# Patient Record
Sex: MALE | HISPANIC OR LATINO | Employment: OTHER | URBAN - METROPOLITAN AREA
[De-identification: names, ages, dates, MRNs, and addresses within clinical notes are randomized per-mention and may not be internally consistent; named-entity substitution may affect disease eponyms.]

---

## 2017-03-10 RX ORDER — ALBUTEROL SULFATE 90 UG/1
AEROSOL, METERED RESPIRATORY (INHALATION)
Qty: 3 INHALER | Refills: 3 | Status: SHIPPED | OUTPATIENT
Start: 2017-03-10 | End: 2017-07-03 | Stop reason: SDUPTHER

## 2017-04-05 DIAGNOSIS — F17.200 TOBACCO USE DISORDER: ICD-10-CM

## 2017-04-06 RX ORDER — VARENICLINE TARTRATE 1 MG/1
1 TABLET, FILM COATED ORAL 2 TIMES DAILY
Qty: 56 TAB | Refills: 1 | Status: SHIPPED | OUTPATIENT
Start: 2017-04-06 | End: 2017-06-16

## 2017-05-11 RX ORDER — TIOTROPIUM BROMIDE 18 UG/1
CAPSULE ORAL; RESPIRATORY (INHALATION)
Qty: 90 CAP | Refills: 3 | Status: SHIPPED | OUTPATIENT
Start: 2017-05-11 | End: 2019-10-11

## 2017-06-09 ENCOUNTER — APPOINTMENT (OUTPATIENT)
Dept: MEDICAL GROUP | Facility: MEDICAL CENTER | Age: 72
End: 2017-06-09
Payer: MEDICAID

## 2017-06-16 ENCOUNTER — OFFICE VISIT (OUTPATIENT)
Dept: MEDICAL GROUP | Facility: PHYSICIAN GROUP | Age: 72
End: 2017-06-16
Payer: MEDICARE

## 2017-06-16 VITALS
TEMPERATURE: 98 F | DIASTOLIC BLOOD PRESSURE: 78 MMHG | SYSTOLIC BLOOD PRESSURE: 122 MMHG | BODY MASS INDEX: 20 KG/M2 | OXYGEN SATURATION: 94 % | WEIGHT: 132 LBS | RESPIRATION RATE: 16 BRPM | HEART RATE: 97 BPM | HEIGHT: 68 IN

## 2017-06-16 DIAGNOSIS — R97.20 ELEVATED PSA: ICD-10-CM

## 2017-06-16 DIAGNOSIS — B07.9 VIRAL WARTS, UNSPECIFIED TYPE: ICD-10-CM

## 2017-06-16 DIAGNOSIS — R91.8 LUNG MASS: ICD-10-CM

## 2017-06-16 DIAGNOSIS — J41.1 MUCOPURULENT CHRONIC BRONCHITIS (HCC): ICD-10-CM

## 2017-06-16 DIAGNOSIS — I73.9 PERIPHERAL ARTERY DISEASE (HCC): ICD-10-CM

## 2017-06-16 DIAGNOSIS — Z23 NEED FOR VACCINATION: ICD-10-CM

## 2017-06-16 DIAGNOSIS — F17.200 TOBACCO USE DISORDER: ICD-10-CM

## 2017-06-16 DIAGNOSIS — Z12.11 SCREEN FOR COLON CANCER: ICD-10-CM

## 2017-06-16 DIAGNOSIS — I35.0 AORTIC STENOSIS, MODERATE: ICD-10-CM

## 2017-06-16 PROCEDURE — 90732 PPSV23 VACC 2 YRS+ SUBQ/IM: CPT | Performed by: INTERNAL MEDICINE

## 2017-06-16 PROCEDURE — 99214 OFFICE O/P EST MOD 30 MIN: CPT | Mod: 25 | Performed by: INTERNAL MEDICINE

## 2017-06-16 PROCEDURE — G0009 ADMIN PNEUMOCOCCAL VACCINE: HCPCS | Performed by: INTERNAL MEDICINE

## 2017-06-16 ASSESSMENT — PATIENT HEALTH QUESTIONNAIRE - PHQ9: CLINICAL INTERPRETATION OF PHQ2 SCORE: 0

## 2017-06-16 NOTE — MR AVS SNAPSHOT
"        Remi Matt   2017 2:20 PM   Office Visit   MRN: [id number]    Department:  [facility]   Dept Phone:  [phone]    Description:  Male : 1945   Provider:  Christopher Mantilla M.D.           Reason for Visit     Establish Care     Warts left 4th finger    Heart Problem pt was told that he had a heart attack       Allergies as of 2017     No Known Allergies      You were diagnosed with     Mucopurulent chronic bronchitis (CMS-formerly [facility])   [491.1.ICD-9-CM]       Tobacco use disorder   [305.1.ICD-9-CM]       Lung mass   [713653]       Peripheral artery disease (CMS-formerly [facility])   [050575]       Aortic stenosis, moderate   [342928]       Viral warts, unspecified type   [3956740]       Elevated PSA   [721135]       Screen for colon cancer   [733306]       Need for vaccination   [983744]         Vital Signs     Blood Pressure Pulse Temperature Respirations Height Weight    122/78 mmHg 97 36.7 °C (98 °F) 16 1.727 m (5' 8\") 59.875 kg (132 lb)    Body Mass Index Oxygen Saturation Smoking Status             20.08 kg/m2 94% Current Every Day Smoker         Basic Information     Date Of Birth Sex Race Ethnicity Preferred Language    1945 Male  or  Non- English      Your appointments     2017  2:40 PM   Established Patient with Christopher Mantilla M.D.   [facility] (--)    [address]  [address]  [facility] [id number] [phone]           You will be receiving a confirmation call a few days before your appointment from our automated call confirmation system.              Problem List              ICD-10-CM Priority Class Noted - Resolved    COPD (chronic obstructive pulmonary disease) (CMS-formerly [facility]) J44.9   Unknown - Present    Elevated PSA R97.20   3/27/2015 - Present    Peripheral artery disease (CMS-HCC) I73.9   2015 - Present    Aortic stenosis, moderate I35.0   2015 - Present    Tobacco use disorder F17.200   2015 - Present    Wart B07.9   2015 - " Present    Lung mass R91.8   6/16/2017 - Present      Health Maintenance        Date Due Completion Dates    IMM DTaP/Tdap/Td Vaccine (1 - Tdap) 10/16/1963 ---    IMM ZOSTER VACCINE 10/16/2004 ---    COLON CANCER SCREENING ANNUAL FIT 3/22/2017 3/22/2016    IMM PNEUMOCOCCAL 65+ (ADULT) LOW/MEDIUM RISK SERIES (2 of 2 - PCV13) 6/16/2018 6/16/2017            Current Immunizations     Influenza Vaccine Adult HD 11/4/2016  2:45 PM    Influenza Vaccine Quad Inj (Pf) 9/18/2015    Pneumococcal Vaccine (UF)Historical Data 9/18/2013    Pneumococcal polysaccharide vaccine (PPSV-23) 6/16/2017  3:19 PM      Below and/or attached are the medications your provider expects you to take. Review all of your home medications and newly ordered medications with your provider and/or pharmacist. Follow medication instructions as directed by your provider and/or pharmacist. Please keep your medication list with you and share with your provider. Update the information when medications are discontinued, doses are changed, or new medications (including over-the-counter products) are added; and carry medication information at all times in the event of emergency situations     Allergies:  No Known Allergies          Medications  Valid as of: June 16, 2017 -  3:23 PM    Generic Name Brand Name Tablet Size Instructions for use    Albuterol Sulfate (Aero Soln) VENTOLIN  (90 BASE) MCG/ACT USE 2 PUFFS BY INHALATIONS EVERY 6 HOURS AS NEEDED FOR SHORTNESS OF BREATH        Aspirin (Tablet Delayed Response) aspirin 81 MG Take 1 Tab by mouth every day.        Fluticasone Furoate-Vilanterol (AEROSOL POWDER, BREATH ACTIVATED) Fluticasone Furoate-Vilanterol 100-25 MCG/INH Inhale 1 Puff by mouth every day.        Ipratropium-Albuterol (Solution) DUONEB 0.5-2.5 (3) MG/3ML USE 1 VIAL INTO NEBULIZER 2 TIMES DAILY AS NEEDED FOR SHORTNESS OF BREATH        Tiotropium Bromide Monohydrate (Cap) SPIRIVA HANDIHALER 18 MCG INHALE 1 CAP BY MOUTH EVERY DAY. AT THE  SAME TIME EVERY DAY        Varenicline Tartrate (Misc) CHANTIX SANDRA 0.5 MG X 11 & 1 MG X 42 0.5 mg by mouth once daily for 3 days, then 0.5 mg by mouth twice daily for 4 days, then 1 mg by mouth once daily.        .                 Medicines prescribed today were sent to:     [id number] [place] - [address]    [address] [place] [address]    Phone: [phone] Fax: [phone]    Open 24 Hours?: No    [id number] - [place] - [address]    [address] [facility] [address]    Phone: [phone] Fax: [phone]    Open 24 Hours?: No      Medication refill instructions:       If your prescription bottle indicates you have medication refills left, it is not necessary to call your provider’s office. Please contact your pharmacy and they will refill your medication.    If your prescription bottle indicates you do not have any refills left, you may request refills at any time through one of the following ways: The online "[employer] system (except Urgent Care), by calling your provider’s office, or by asking your pharmacy to contact your provider’s office with a refill request. Medication refills are processed only during regular business hours and may not be available until the next business day. Your provider may request additional information or to have a follow-up visit with you prior to refilling your medication.   *Please Note: Medication refills are assigned a new Rx number when refilled electronically. Your pharmacy may indicate that no refills were authorized even though a new prescription for the same medication is available at the pharmacy. Please request the medicine by name with the pharmacy before contacting your provider for a refill.        Your To Do List     Future Labs/Procedures Complete By Expires    COMP METABOLIC PANEL  As directed 6/17/2018    CT-CHEST (THORAX) WITH  As directed 12/17/2017    VITAMIN D,25 HYDROXY  As directed 6/17/2018      Referral     A referral  request has been sent to our patient care coordination department. Please allow 3-5 business days for us to process this request and contact you either by phone or mail. If you do not hear from us by the 5th business day, please call us at [phone].           Niko Status: Patient Declined        Quit Tobacco Information     Do you want to quit using tobacco?    Quitting tobacco decreases risks of cancer, heart and lung disease, increases life expectancy, improves sense of taste and smell, and increases spending money, among other benefits.    If you are thinking about quitting, we can help.  • [facility] Quit Tobacco Program: [phone]  o Program occurs weekly for four weeks and includes pharmacist consultation on products to support quitting smoking or chewing tobacco. A provider referral is needed for pharmacist consultation.  • Tobacco Users Help Hotline: 0-075-QUITNOW (969-1419) or https://[place].quitlogix.org/  o Free, confidential telephone and online coaching for [place] residents. Sessions are designed on a schedule that is convenient for you. Eligible clients receive free nicotine replacement therapy.  • Nationally: www.smokefree.gov  o Information and professional assistance to support both immediate and long-term needs as you become, and remain, a non-smoker. Smokefree.gov allows you to choose the help that best fits your needs.

## 2017-06-16 NOTE — ASSESSMENT & PLAN NOTE
Moderate, per chart review   Echocardiogram 2015  CONCLUSIONS  Normal left ventricular chamber size. Normal left ventricular wall   thickness. Normal left ventricular systolic function. Ejection fraction   is measured to be 60% by Rosenthal's biplane 2D analysis. Grade I   diastolic dysfunction.  Moderately dilated right ventricle.  Enlarged right atrium.  Possible bicuspid aortic valve with leaflet calcification. Mild to   moderate aortic stenosis. Transvalvular gradients are Peak: 38mmHg,   Mean: 22mmHg. Aortic valve area calculated from the continuity equation   is 0.8 cm sq. Dimensionless index is(0.32).

## 2017-06-16 NOTE — ASSESSMENT & PLAN NOTE
Right fifth digit warts. He has had cryotherapy in the past. After informed consent obtained, cryotherapy was applied to the lesions personally by me. Patient tolerated the procedure well. Wound care instructions were given.

## 2017-06-16 NOTE — PROGRESS NOTES
Subjective:   Remi Matt is a 71 y.o. male here today for lung mass, finger warts    Aortic stenosis, moderate  Moderate, per chart review   Echocardiogram 2015  CONCLUSIONS  Normal left ventricular chamber size. Normal left ventricular wall   thickness. Normal left ventricular systolic function. Ejection fraction   is measured to be 60% by Rosenthal's biplane 2D analysis. Grade I   diastolic dysfunction.  Moderately dilated right ventricle.  Enlarged right atrium.  Possible bicuspid aortic valve with leaflet calcification. Mild to   moderate aortic stenosis. Transvalvular gradients are Peak: 38mmHg,   Mean: 22mmHg. Aortic valve area calculated from the continuity equation   is 0.8 cm sq. Dimensionless index is(0.32).    Elevated PSA  PSA 4.8 (10/2016).    Wart  Right fifth digit warts. He has had cryotherapy in the past. After informed consent obtained, cryotherapy was applied to the lesions personally by me. Patient tolerated the procedure well. Wound care instructions were given.    Tobacco use disorder  He still continues to smoke. He stopped smoking for some time and then restarted again when his father passed away. He is asking for chantix. Prescription given    Peripheral artery disease (CMS-HCC)  Denies claudication. Peripheral vascular disease per chart review. No CEM on file.     Lung mass  05/19/2017 he was in the hospital at [place] because of a fall after he drank a strong beer. His son was concerned therefore she sent to hospital, lab work normal, . He had CXR showed a mass right mid lung. he denied night sweats, unintentional weight loss or hemoptysis. He is a smoker    COPD (chronic obstructive pulmonary disease)  He never had PFTs in follow-up. He denies chronic cough or shortness of breath. Still continue to smoke. Will evaluate            Current medicines (including changes today)  Current Outpatient Prescriptions   Medication Sig Dispense Refill   • varenicline (CHANTIX STARTING MONTH  SANDRA) 0.5 MG X 11 & 1 MG X 42 tablet 0.5 mg by mouth once daily for 3 days, then 0.5 mg by mouth twice daily for 4 days, then 1 mg by mouth once daily. 56 Tab 3   • SPIRIVA HANDIHALER 18 MCG Cap INHALE 1 CAP BY MOUTH EVERY DAY. AT THE SAME TIME EVERY DAY 90 Cap 3   • VENTOLIN  (90 BASE) MCG/ACT Aero Soln inhalation aerosol USE 2 PUFFS BY INHALATIONS EVERY 6 HOURS AS NEEDED FOR SHORTNESS OF BREATH 3 Inhaler 3   • ipratropium-albuterol (DUONEB) 0.5-2.5 (3) MG/3ML nebulizer solution USE 1 VIAL INTO NEBULIZER 2 TIMES DAILY AS NEEDED FOR SHORTNESS OF BREATH 180 mL 11   • Fluticasone Furoate-Vilanterol 100-25 MCG/INH AEROSOL POWDER, BREATH ACTIVATED Inhale 1 Puff by mouth every day. 3 Each 11   • aspirin EC 81 MG EC tablet Take 1 Tab by mouth every day. 100 Tab 0     No current facility-administered medications for this visit.     He  has a past medical history of COPD (chronic obstructive pulmonary disease) (CMS-[facility]).    Current Outpatient Prescriptions   Medication Sig Dispense Refill   • varenicline (CHANTIX STARTING MONTH PAK) 0.5 MG X 11 & 1 MG X 42 tablet 0.5 mg by mouth once daily for 3 days, then 0.5 mg by mouth twice daily for 4 days, then 1 mg by mouth once daily. 56 Tab 3   • SPIRIVA HANDIHALER 18 MCG Cap INHALE 1 CAP BY MOUTH EVERY DAY. AT THE SAME TIME EVERY DAY 90 Cap 3   • VENTOLIN  (90 BASE) MCG/ACT Aero Soln inhalation aerosol USE 2 PUFFS BY INHALATIONS EVERY 6 HOURS AS NEEDED FOR SHORTNESS OF BREATH 3 Inhaler 3   • ipratropium-albuterol (DUONEB) 0.5-2.5 (3) MG/3ML nebulizer solution USE 1 VIAL INTO NEBULIZER 2 TIMES DAILY AS NEEDED FOR SHORTNESS OF BREATH 180 mL 11   • Fluticasone Furoate-Vilanterol 100-25 MCG/INH AEROSOL POWDER, BREATH ACTIVATED Inhale 1 Puff by mouth every day. 3 Each 11   • aspirin EC 81 MG EC tablet Take 1 Tab by mouth every day. 100 Tab 0     No current facility-administered medications for this visit.       Allergies as of 06/16/2017   • (No Known Allergies)  "      Social History     Social History   • Marital Status:      Spouse Name: N/A   • Number of Children: N/A   • Years of Education: N/A     Occupational History   • Not on file.     Social History Main Topics   • Smoking status: Current Every Day Smoker -- 0.25 packs/day for 20 years     Types: Cigarettes     Last Attempt to Quit: 08/01/2014   • Smokeless tobacco: Never Used   • Alcohol Use: 0.0 oz/week     0 Standard drinks or equivalent per week      Comment: occ   • Drug Use: No   • Sexual Activity: Not on file      Comment: single retired      Other Topics Concern   • Not on file     Social History Narrative        Family History   Problem Relation Age of Onset   • Cancer Neg Hx    • Diabetes Neg Hx    • Heart Disease Neg Hx    • Stroke Neg Hx    • Heart Attack Neg Hx        Past Surgical History   Procedure Laterality Date   • Arthroscopy, knee  1978     meniscus, right   • Hernia repair  child   • Tonsillectomy  child       ROS   All systems reviewed are negative except for HPI         Objective:     Blood pressure 122/78, pulse 97, temperature 36.7 °C (98 °F), resp. rate 16, height 1.727 m (5' 8\"), weight 59.875 kg (132 lb), SpO2 94 %. Body mass index is 20.08 kg/(m^2).   Physical Exam:  Constitutional: Alert, no distress.  Skin: Warm, dry, good turgor, no rashes in visible areas.  Eye: Equal, round and reactive, conjunctiva clear, lids normal.  ENMT: Lips without lesions, good dentition, oropharynx clear.  Neck: Trachea midline, no masses, no thyromegaly. No cervical or supraclavicular lymphadenopathy  Respiratory: Unlabored respiratory effort, lungs clear to auscultation, no wheezes, no ronchi.  Cardiovascular: Normal S1, S2, no murmur, no edema.  Abdomen: Soft, non-tender, no masses, no hepatosplenomegaly.  Psych: Alert and oriented x3, normal affect and mood.        Assessment and Plan:   The following treatment plan was discussed    1. Mucopurulent chronic bronchitis " (CMS-HCC)  Stable, no signs of exacerbation, PFTs for further eval   - PULMONARY FUNCTION TESTS Test requested: Complete Pulmonary Function Test    2. Tobacco use disorder  counseling provided . Pt would like to try chantix again . Prescription given   - varenicline (CHANTIX STARTING MONTH SANDRA) 0.5 MG X 11 & 1 MG X 42 tablet; 0.5 mg by mouth once daily for 3 days, then 0.5 mg by mouth twice daily for 4 days, then 1 mg by mouth once daily.  Dispense: 56 Tab; Refill: 3  - VITAMIN D,25 HYDROXY; Future    3. Lung mass  Needs to be evaluated with CT scan. Order in place . No signs of active cancer   - CT-CHEST (THORAX) WITH; Future  - COMP METABOLIC PANEL; Future    4. Peripheral artery disease (CMS-HCC)  Stabel. CEM at next apt     5. Aortic stenosis, moderate  Not symptomatic     6. Viral warts, unspecified type  Cryotherapy as per above     7. Elevated PSA  Continue to monitor     8. Screen for colon cancer  - REFERRAL TO GASTROENTEROLOGY    9. Need for vaccination  - PNEUMOCOCCAL POLYSACCHARIDE VACCINE 23-VALENT =>1YO SQ/IM      Followup: Return in about 4 weeks (around 7/14/2017) for Short.

## 2017-06-16 NOTE — ASSESSMENT & PLAN NOTE
05/19/2017 he was in the hospital at [place] because of a fall after he drank a strong beer. His son was concerned therefore she sent to hospital, lab work normal, . He had CXR showed a mass right mid lung. he denied night sweats, unintentional weight loss or hemoptysis. He is a smoker

## 2017-06-16 NOTE — ASSESSMENT & PLAN NOTE
He never had PFTs in follow-up. He denies chronic cough or shortness of breath. Still continue to smoke. Will evaluate

## 2017-06-16 NOTE — ASSESSMENT & PLAN NOTE
He still continues to smoke. He stopped smoking for some time and then restarted again when his father passed away. He is asking for chantix. Prescription given

## 2017-06-21 ENCOUNTER — HOSPITAL ENCOUNTER (OUTPATIENT)
Dept: LAB | Facility: MEDICAL CENTER | Age: 72
End: 2017-06-21
Attending: INTERNAL MEDICINE
Payer: MEDICARE

## 2017-06-21 DIAGNOSIS — F17.200 TOBACCO USE DISORDER: ICD-10-CM

## 2017-06-21 DIAGNOSIS — R91.8 LUNG MASS: ICD-10-CM

## 2017-06-21 LAB
25(OH)D3 SERPL-MCNC: 25 NG/ML (ref 30–100)
ALBUMIN SERPL BCP-MCNC: 3.9 G/DL (ref 3.2–4.9)
ALBUMIN/GLOB SERPL: 1.4 G/DL
ALP SERPL-CCNC: 63 U/L (ref 30–99)
ALT SERPL-CCNC: 12 U/L (ref 2–50)
ANION GAP SERPL CALC-SCNC: 5 MMOL/L (ref 0–11.9)
AST SERPL-CCNC: 14 U/L (ref 12–45)
BILIRUB SERPL-MCNC: 0.6 MG/DL (ref 0.1–1.5)
BUN SERPL-MCNC: 15 MG/DL (ref 8–22)
CALCIUM SERPL-MCNC: 9 MG/DL (ref 8.5–10.5)
CHLORIDE SERPL-SCNC: 107 MMOL/L (ref 96–112)
CO2 SERPL-SCNC: 25 MMOL/L (ref 20–33)
CREAT SERPL-MCNC: 0.76 MG/DL (ref 0.5–1.4)
GFR SERPL CREATININE-BSD FRML MDRD: >60 ML/MIN/1.73 M 2
GLOBULIN SER CALC-MCNC: 2.8 G/DL (ref 1.9–3.5)
GLUCOSE SERPL-MCNC: 95 MG/DL (ref 65–99)
POTASSIUM SERPL-SCNC: 4.1 MMOL/L (ref 3.6–5.5)
PROT SERPL-MCNC: 6.7 G/DL (ref 6–8.2)
SODIUM SERPL-SCNC: 137 MMOL/L (ref 135–145)

## 2017-06-21 PROCEDURE — 82306 VITAMIN D 25 HYDROXY: CPT

## 2017-06-21 PROCEDURE — 80053 COMPREHEN METABOLIC PANEL: CPT

## 2017-06-21 PROCEDURE — 36415 COLL VENOUS BLD VENIPUNCTURE: CPT

## 2017-06-22 ENCOUNTER — TELEPHONE (OUTPATIENT)
Dept: MEDICAL GROUP | Facility: PHYSICIAN GROUP | Age: 72
End: 2017-06-22

## 2017-06-22 NOTE — TELEPHONE ENCOUNTER
Phone Number Called: [phone] (home)     Message: Pt notified of results below.     Left Message for patient to call back: no

## 2017-06-22 NOTE — TELEPHONE ENCOUNTER
----- Message from Christopher Mantilla M.D. sent at 6/22/2017  6:59 AM PDT -----  Please notify patient that blood sugar, kidney function, liver function, are normal  Vitamin D slight lower. Advise to take over the encounter vitamin D 1000 U per day    Thank you    Christopher Mantilla M.D.

## 2017-06-23 ENCOUNTER — HOSPITAL ENCOUNTER (OUTPATIENT)
Dept: RADIOLOGY | Facility: MEDICAL CENTER | Age: 72
End: 2017-06-23
Attending: INTERNAL MEDICINE
Payer: MEDICARE

## 2017-06-23 DIAGNOSIS — J42 CHRONIC BRONCHITIS, UNSPECIFIED CHRONIC BRONCHITIS TYPE (HCC): ICD-10-CM

## 2017-06-23 DIAGNOSIS — R91.8 LUNG MASS: ICD-10-CM

## 2017-06-23 PROCEDURE — 700117 HCHG RX CONTRAST REV CODE 255: Performed by: INTERNAL MEDICINE

## 2017-06-23 PROCEDURE — 71260 CT THORAX DX C+: CPT

## 2017-06-23 RX ADMIN — IOHEXOL 75 ML: 350 INJECTION, SOLUTION INTRAVENOUS at 10:34

## 2017-06-23 NOTE — TELEPHONE ENCOUNTER
Was the patient seen in the last year in this department? Yes     Does patient have an active prescription for medications requested? No     Received Request Via: Pharmacy     Patient requesting a 90 day supply please advise, thank you.

## 2017-06-26 ENCOUNTER — TELEPHONE (OUTPATIENT)
Dept: MEDICAL GROUP | Facility: PHYSICIAN GROUP | Age: 72
End: 2017-06-26

## 2017-06-26 NOTE — TELEPHONE ENCOUNTER
Ct scan shows lung disease from smoking. There is emphysema and scar tissue., otherwise not mass. Will repeat chest ct next year   Pt is working on smoking cessation   Christopher Mantilla M.D.

## 2017-06-27 ENCOUNTER — HOSPITAL ENCOUNTER (OUTPATIENT)
Dept: OTHER | Facility: MEDICAL CENTER | Age: 72
End: 2017-06-27
Attending: INTERNAL MEDICINE
Payer: MEDICARE

## 2017-06-27 DIAGNOSIS — J41.1 MUCOPURULENT CHRONIC BRONCHITIS (HCC): ICD-10-CM

## 2017-06-27 PROCEDURE — 94729 DIFFUSING CAPACITY: CPT

## 2017-06-27 PROCEDURE — 94726 PLETHYSMOGRAPHY LUNG VOLUMES: CPT | Mod: 26 | Performed by: INTERNAL MEDICINE

## 2017-06-27 PROCEDURE — 94060 EVALUATION OF WHEEZING: CPT

## 2017-06-27 PROCEDURE — 94729 DIFFUSING CAPACITY: CPT | Mod: 26 | Performed by: INTERNAL MEDICINE

## 2017-06-27 PROCEDURE — 94060 EVALUATION OF WHEEZING: CPT | Mod: 26 | Performed by: INTERNAL MEDICINE

## 2017-06-27 PROCEDURE — 94726 PLETHYSMOGRAPHY LUNG VOLUMES: CPT

## 2017-06-27 ASSESSMENT — PULMONARY FUNCTION TESTS
FVC_PERCENT_PREDICTED: 86
FEV1/FVC_PERCENT_CHANGE: 100
FEV1: 2.13
FEV1_PREDICTED: 3
FVC_PERCENT_PREDICTED: 88
FVC: 3.47
FEV1_PERCENT_CHANGE: 2
FEV1_PERCENT_PREDICTED: 73
FEV1/FVC_PERCENT_PREDICTED: 83
FEV1_PERCENT_PREDICTED: 71
FEV1/FVC: 62.82
FEV1/FVC_PERCENT_PREDICTED: 83
FEV1/FVC_PERCENT_PREDICTED: 76
FEV1: 2.18
FEV1_PERCENT_CHANGE: 2
FEV1/FVC: 63
FVC: 3.4
FVC_PREDICTED: 3.94

## 2017-06-30 NOTE — PROGRESS NOTES
The given diagnoses are COPD and lung mass.  Technical comments indicate good effort on the part of the patient but he was unable to maintain the required respiratory frequency during lung volume determinations.    Spirometry and the flow volume loop suggest mild to moderately severe obstructive airways disease with an FEV1 to FVC ratio of 63% and reduced midexpiratory airflow.  There is no significant change in dynamic air flow after administration of an inhaled bronchodilator.  The FEV1 is 2.13 L, or 71% of the predicted value.  The shape of the flow volume loop does suggest significant airway obstruction but also incomplete maximal effort.  Maximal voluntary ventilation is reduced out of proportion to the FEV1 suggesting possible fatigue, neuromuscular weakness, or suboptimal effort.    Measured lung volumes include a normal total lung capacity with increased residual volume suggesting hyperinflation.  Diffusing capacity is normal.  The postbronchodilator plethysmography is uninterpretable.  Airway resistance measurements demonstrate a marked increase in airway obstruction with loss of airway conductance.    Assessment:  Moderately severe obstructive airways disease with hyperinflation.  The lack of bronchodilator response on this study does not preclude a clinical response to bronchodilator therapy.  The test is limited by incomplete adherence to the testing program.

## 2017-07-05 RX ORDER — ALBUTEROL SULFATE 90 UG/1
AEROSOL, METERED RESPIRATORY (INHALATION)
Qty: 3 INHALER | Refills: 3 | Status: SHIPPED | OUTPATIENT
Start: 2017-07-05 | End: 2020-01-23

## 2017-07-07 ENCOUNTER — OFFICE VISIT (OUTPATIENT)
Dept: MEDICAL GROUP | Facility: PHYSICIAN GROUP | Age: 72
End: 2017-07-07
Payer: MEDICARE

## 2017-07-07 ENCOUNTER — HOSPITAL ENCOUNTER (OUTPATIENT)
Dept: LAB | Facility: MEDICAL CENTER | Age: 72
End: 2017-07-07
Attending: INTERNAL MEDICINE
Payer: MEDICARE

## 2017-07-07 VITALS
BODY MASS INDEX: 19.85 KG/M2 | HEART RATE: 80 BPM | DIASTOLIC BLOOD PRESSURE: 72 MMHG | TEMPERATURE: 98.6 F | OXYGEN SATURATION: 96 % | RESPIRATION RATE: 16 BRPM | HEIGHT: 68 IN | SYSTOLIC BLOOD PRESSURE: 110 MMHG | WEIGHT: 131 LBS

## 2017-07-07 DIAGNOSIS — I73.9 PERIPHERAL ARTERY DISEASE (HCC): ICD-10-CM

## 2017-07-07 DIAGNOSIS — M19.042 PRIMARY OSTEOARTHRITIS OF BOTH HANDS: ICD-10-CM

## 2017-07-07 DIAGNOSIS — Z13.6 SCREENING FOR AAA (ABDOMINAL AORTIC ANEURYSM): ICD-10-CM

## 2017-07-07 DIAGNOSIS — F17.200 TOBACCO USE DISORDER: ICD-10-CM

## 2017-07-07 DIAGNOSIS — M19.041 PRIMARY OSTEOARTHRITIS OF BOTH HANDS: ICD-10-CM

## 2017-07-07 DIAGNOSIS — J41.1 MUCOPURULENT CHRONIC BRONCHITIS (HCC): ICD-10-CM

## 2017-07-07 DIAGNOSIS — R91.8 LUNG MASS: ICD-10-CM

## 2017-07-07 DIAGNOSIS — I35.0 AORTIC STENOSIS, MODERATE: ICD-10-CM

## 2017-07-07 DIAGNOSIS — B07.9 VIRAL WARTS, UNSPECIFIED TYPE: ICD-10-CM

## 2017-07-07 PROBLEM — M19.049 PRIMARY OSTEOARTHRITIS OF HAND: Status: ACTIVE | Noted: 2017-07-07

## 2017-07-07 LAB
CRP SERPL HS-MCNC: 2.89 MG/DL (ref 0–0.75)
ERYTHROCYTE [SEDIMENTATION RATE] IN BLOOD BY WESTERGREN METHOD: 25 MM/HOUR (ref 0–20)
RHEUMATOID FACT SER IA-ACNC: <10 IU/ML (ref 0–14)
URATE SERPL-MCNC: 3.9 MG/DL (ref 2.5–8.3)

## 2017-07-07 PROCEDURE — 85652 RBC SED RATE AUTOMATED: CPT

## 2017-07-07 PROCEDURE — 86200 CCP ANTIBODY: CPT

## 2017-07-07 PROCEDURE — 84550 ASSAY OF BLOOD/URIC ACID: CPT

## 2017-07-07 PROCEDURE — 36415 COLL VENOUS BLD VENIPUNCTURE: CPT

## 2017-07-07 PROCEDURE — 86235 NUCLEAR ANTIGEN ANTIBODY: CPT | Mod: 91

## 2017-07-07 PROCEDURE — 86038 ANTINUCLEAR ANTIBODIES: CPT

## 2017-07-07 PROCEDURE — 99214 OFFICE O/P EST MOD 30 MIN: CPT | Performed by: INTERNAL MEDICINE

## 2017-07-07 PROCEDURE — 86225 DNA ANTIBODY NATIVE: CPT

## 2017-07-07 PROCEDURE — 86140 C-REACTIVE PROTEIN: CPT

## 2017-07-07 PROCEDURE — 86431 RHEUMATOID FACTOR QUANT: CPT

## 2017-07-07 NOTE — MR AVS SNAPSHOT
"        Remi Matt   2017 1:00 PM   Office Visit   MRN: [id number]    Department:  [facility]   Dept Phone:  [phone]    Description:  Male : 1945   Provider:  Christopher Mantilla M.D.           Reason for Visit     Leg Pain started almost a month ago px in leg from knee to foot when waking at night takes 20 min to get out of bed pt states      Allergies as of 2017     No Known Allergies      You were diagnosed with     Primary osteoarthritis of both hands   [3469729]       Viral warts, unspecified type   [7984902]       Mucopurulent chronic bronchitis (CMS-[facility])   [491.1.ICD-9-CM]       Lung mass   [104834]       Screening for AAA (abdominal aortic aneurysm)   [366124]         Vital Signs     Blood Pressure Pulse Temperature Respirations Height Weight    110/72 mmHg 80 37 °C (98.6 °F) 16 1.727 m (5' 8\") 59.421 kg (131 lb)    Body Mass Index Oxygen Saturation Smoking Status             19.92 kg/m2 96% Current Every Day Smoker         Basic Information     Date Of Birth Sex Race Ethnicity Preferred Language    1945 Male  or  Non- English      Your appointments     2017  2:40 PM   Established Patient with Christopher Mantilla M.D.   [facility] (--)    [address]  [address]  [facility] [id number] [phone]           You will be receiving a confirmation call a few days before your appointment from our automated call confirmation system.            Aug 08, 2017 10:20 AM   Established Patient with Christopher Mantilla M.D.   [facility] (--)    [address]  [address]  [place] [address] [phone]           You will be receiving a confirmation call a few days before your appointment from our automated call confirmation system.              Problem List              ICD-10-CM Priority Class Noted - Resolved    COPD (chronic obstructive pulmonary disease) (CMS-HCC) J44.9   Unknown - Present    Elevated PSA R97.20   3/27/2015 - Present   " Peripheral artery disease (CMS-[facility]) I73.9   9/24/2015 - Present    Aortic stenosis, moderate I35.0   9/30/2015 - Present    Tobacco use disorder F17.200   11/6/2015 - Present    Wart B07.9   12/22/2015 - Present    Lung mass R91.8   6/16/2017 - Present    Primary osteoarthritis of hand M19.049   7/7/2017 - Present    Screening for AAA (abdominal aortic aneurysm) Z13.6   7/7/2017 - Present      Health Maintenance        Date Due Completion Dates    IMM DTaP/Tdap/Td Vaccine (1 - Tdap) 10/16/1963 ---    IMM ZOSTER VACCINE 10/16/2004 ---    COLON CANCER SCREENING ANNUAL FIT 3/22/2017 3/22/2016    IMM INFLUENZA (1) 9/1/2017 11/4/2016, 9/18/2015    IMM PNEUMOCOCCAL 65+ (ADULT) LOW/MEDIUM RISK SERIES (2 of 2 - PCV13) 6/16/2018 6/16/2017            Current Immunizations     Influenza Vaccine Adult HD 11/4/2016  2:45 PM    Influenza Vaccine Quad Inj (Pf) 9/18/2015    Pneumococcal Vaccine (UF)Historical Data 9/18/2013    Pneumococcal polysaccharide vaccine (PPSV-23) 6/16/2017  3:19 PM      Below and/or attached are the medications your provider expects you to take. Review all of your home medications and newly ordered medications with your provider and/or pharmacist. Follow medication instructions as directed by your provider and/or pharmacist. Please keep your medication list with you and share with your provider. Update the information when medications are discontinued, doses are changed, or new medications (including over-the-counter products) are added; and carry medication information at all times in the event of emergency situations     Allergies:  No Known Allergies          Medications  Valid as of: July 07, 2017 -  1:43 PM    Generic Name Brand Name Tablet Size Instructions for use    Albuterol Sulfate (Aero Soln) albuterol 108 (90 BASE) MCG/ACT USE 2 PUFFS BY INHALATIONS EVERY 6 HOURS AS NEEDED FOR SHORTNESS OF BREATH        Aspirin (Tablet Delayed Response) aspirin 81 MG Take 1 Tab by mouth every day.         Fluticasone Furoate-Vilanterol (AEROSOL POWDER, BREATH ACTIVATED) Fluticasone Furoate-Vilanterol 100-25 MCG/INH Inhale 1 Puff by mouth every day.        Ipratropium-Albuterol (Solution) DUONEB 0.5-2.5 (3) MG/3ML USE 1 VIAL INTO NEBULIZER 2 TIMES DAILY AS NEEDED FOR SHORTNESS OF BREATH        Tiotropium Bromide Monohydrate (Cap) SPIRIVA HANDIHALER 18 MCG INHALE 1 CAP BY MOUTH EVERY DAY. AT THE SAME TIME EVERY DAY        Varenicline Tartrate (Misc) CHANTIX SANDRA 0.5 MG X 11 & 1 MG X 42 0.5 mg by mouth once daily for 3 days, then 0.5 mg by mouth twice daily for 4 days, then 1 mg by mouth once daily.        .                 Medicines prescribed today were sent to:     [facility][id number] - [place] - [address]    [address] [place] [address]    Phone: [phone] Fax: [phone]    Open 24 Hours?: No    [id number] [place] [address] [facility] [place]    [address] [facility] [address]    Phone: [phone] Fax: [phone]    Open 24 Hours?: No      Medication refill instructions:       If your prescription bottle indicates you have medication refills left, it is not necessary to call your provider’s office. Please contact your pharmacy and they will refill your medication.    If your prescription bottle indicates you do not have any refills left, you may request refills at any time through one of the following ways: The online Zuffle system (except Urgent Care), by calling your provider’s office, or by asking your pharmacy to contact your provider’s office with a refill request. Medication refills are processed only during regular business hours and may not be available until the next business day. Your provider may request additional information or to have a follow-up visit with you prior to refilling your medication.   *Please Note: Medication refills are assigned a new Rx number when refilled electronically. Your pharmacy may indicate that no refills were authorized even though a new prescription  for the same medication is available at the pharmacy. Please request the medicine by name with the pharmacy before contacting your provider for a refill.        Your To Do List     Future Labs/Procedures Complete By Expires    SILVINO ANTIBODY WITH REFLEX  As directed 7/8/2018    CCP ANTIBODY  As directed 7/8/2018    CRP QUANTITIVE (NON-CARDIAC)  As directed 7/7/2018    RHEUMATOID ARTHRITIS FACTOR  As directed 7/7/2018    US-ABDOMINAL AORTA SCREEN AAA  As directed 7/7/2018    WESTERGREN SED RATE  As directed 7/7/2018      Referral     A referral request has been sent to our patient care coordination department. Please allow 3-5 business days for us to process this request and contact you either by phone or mail. If you do not hear from us by the 5th business day, please call us at [phone].        Instructions    Try tylenol prn           SampalRx Access Code: [email or web address]  Expires: 7/27/2017  6:43 AM    SampalRx  A secure, online tool to manage your health information     [employer]’s SampalRx® is a secure, online tool that connects you to your personalized health information from the privacy of your home -- day or night - making it very easy for you to manage your healthcare. Once the activation process is completed, you can even access your medical information using the SampalRx helder, which is available for free in the Apple Helder store or Google Play store.     SampalRx provides the following levels of access (as shown below):   My Chart Features   [facility] Doctor [facility]  Specialists [facility]  Urgent  Care Non-[facility]  Primary Care  Doctor   Email your healthcare team securely and privately 24/7 X X X    Manage appointments: schedule your next appointment; view details of past/upcoming appointments X      Request prescription refills. X      View recent personal medical records, including lab and immunizations X X X X   View health record, including health history, allergies, medications X X X X   Read  reports about your outpatient visits, procedures, consult and ER notes X X X X   See your discharge summary, which is a recap of your hospital and/or ER visit that includes your diagnosis, lab results, and care plan. X X       How to register for Circa:  1. Go to  [email or web address].  2. Click on the Sign Up Now box, which takes you to the New Member Sign Up page. You will need to provide the following information:  a. Enter your Circa Access Code exactly as it appears at the top of this page. (You will not need to use this code after you’ve completed the sign-up process. If you do not sign up before the expiration date, you must request a new code.)   b. Enter your date of birth.   c. Enter your home email address.   d. Click Submit, and follow the next screen’s instructions.  3. Create a Circa ID. This will be your Circa login ID and cannot be changed, so think of one that is secure and easy to remember.  4. Create a Circa password. You can change your password at any time.  5. Enter your Password Reset Question and Answer. This can be used at a later time if you forget your password.   6. Enter your e-mail address. This allows you to receive e-mail notifications when new information is available in Circa.  7. Click Sign Up. You can now view your health information.    For assistance activating your Circa account, call [phone]        Quit Tobacco Information     Do you want to quit using tobacco?    Quitting tobacco decreases risks of cancer, heart and lung disease, increases life expectancy, improves sense of taste and smell, and increases spending money, among other benefits.    If you are thinking about quitting, we can help.  • [facility] Quit Tobacco Program: [phone]  o Program occurs weekly for four weeks and includes pharmacist consultation on products to support quitting smoking or chewing tobacco. A provider referral is needed for pharmacist consultation.  • Tobacco Users  Help Hotline: 2-800-QUIT-NOW (400-9479) or https://[place].quitlogix.org/  o Free, confidential telephone and online coaching for [place] residents. Sessions are designed on a schedule that is convenient for you. Eligible clients receive free nicotine replacement therapy.  • Nationally: www.smokefree.gov  o Information and professional assistance to support both immediate and long-term needs as you become, and remain, a non-smoker. Smokefree.gov allows you to choose the help that best fits your needs.

## 2017-07-08 NOTE — ASSESSMENT & PLAN NOTE
He is feeling great on chantix. he stopped smoking 4 days ago. Congratulation pt, and reinforced absent

## 2017-07-08 NOTE — ASSESSMENT & PLAN NOTE
CT showed that pt has emphysema. He denies shortness of breath, chest pain, chronic cough. No PFTs on file. I'll refer to pulmonology's and PFTs for further evaluation. Patient currently on albuterol, fluticasone, DuoNeb and Spiriva. Stable

## 2017-07-08 NOTE — ASSESSMENT & PLAN NOTE
Denies claudication.carotid doppler 2015 Mild atherosclerotic plaque involving the bilateral internal carotid  arteries. No evidence of a hemodynamically significant stenosis bilaterally.

## 2017-07-08 NOTE — ASSESSMENT & PLAN NOTE
See previous note. CT scan done and showed scar tissue. I will refer to [facility] for further eval. He denies hemoptysis, unintentional weight loss, night sweats

## 2017-07-08 NOTE — ASSESSMENT & PLAN NOTE
He tells me that when he wakes up in the morning he feels pain on hands, thigh, he can barely walk. He is stiff. He tried talking ibuprofen and seems that helps him significantly. He has no pain in 30 mins after he uses ibuprofen, he is concerned for side effects. Education provided in regards to side effects, complications

## 2017-07-08 NOTE — PROGRESS NOTES
Subjective:   Remi Matt is a 71 y.o. male here today for lab work, CT review, smoking cessation     Wart  Improved, but still there. Will try cryotherapy again at next apt     Tobacco use disorder  He is feeling great on chantix. he stopped smoking 4 days ago. Congratulation pt, and reinforced absent     Screening for AAA (abdominal aortic aneurysm)  He needs AAA screening    Primary osteoarthritis of hand  He tells me that when he wakes up in the morning he feels pain on hands, thigh, he can barely walk. He is stiff. He tried talking ibuprofen and seems that helps him significantly. He has no pain in 30 mins after he uses ibuprofen, he is concerned for side effects. Education provided in regards to side effects, complications     Peripheral artery disease (CMS-[facility])  Denies claudication.carotid doppler 2015 Mild atherosclerotic plaque involving the bilateral internal carotid  arteries. No evidence of a hemodynamically significant stenosis bilaterally.    Lung mass  See previous note. CT scan done and showed scar tissue. I will refer to [facility] for further eval. He denies hemoptysis, unintentional weight loss, night sweats    COPD (chronic obstructive pulmonary disease)  CT showed that pt has emphysema. He denies shortness of breath, chest pain, chronic cough. No PFTs on file. I'll refer to pulmonology's and PFTs for further evaluation. Patient currently on albuterol, fluticasone, DuoNeb and Spiriva. Stable      Aortic stenosis, moderate  Echocardiogram 2015. Moderate, asymptomatic. Will repeat echocardiogram next year         Current medicines (including changes today)  Current Outpatient Prescriptions   Medication Sig Dispense Refill   • albuterol (VENTOLIN HFA) 108 (90 BASE) MCG/ACT Aero Soln inhalation aerosol USE 2 PUFFS BY INHALATIONS EVERY 6 HOURS AS NEEDED FOR SHORTNESS OF BREATH 3 Inhaler 3   • varenicline (CHANTIX STARTING MONTH PAK) 0.5 MG X 11 & 1 MG X 42 tablet 0.5 mg by mouth once daily for 3 days,  then 0.5 mg by mouth twice daily for 4 days, then 1 mg by mouth once daily. 56 Tab 3   • SPIRIVA HANDIHALER 18 MCG Cap INHALE 1 CAP BY MOUTH EVERY DAY. AT THE SAME TIME EVERY DAY 90 Cap 3   • ipratropium-albuterol (DUONEB) 0.5-2.5 (3) MG/3ML nebulizer solution USE 1 VIAL INTO NEBULIZER 2 TIMES DAILY AS NEEDED FOR SHORTNESS OF BREATH 180 mL 11   • Fluticasone Furoate-Vilanterol 100-25 MCG/INH AEROSOL POWDER, BREATH ACTIVATED Inhale 1 Puff by mouth every day. 3 Each 11   • aspirin EC 81 MG EC tablet Take 1 Tab by mouth every day. 100 Tab 0     No current facility-administered medications for this visit.     He  has a past medical history of COPD (chronic obstructive pulmonary disease) (CMS-[facility]).    Current Outpatient Prescriptions   Medication Sig Dispense Refill   • albuterol (VENTOLIN HFA) 108 (90 BASE) MCG/ACT Aero Soln inhalation aerosol USE 2 PUFFS BY INHALATIONS EVERY 6 HOURS AS NEEDED FOR SHORTNESS OF BREATH 3 Inhaler 3   • varenicline (CHANTIX STARTING MONTH SANDRA) 0.5 MG X 11 & 1 MG X 42 tablet 0.5 mg by mouth once daily for 3 days, then 0.5 mg by mouth twice daily for 4 days, then 1 mg by mouth once daily. 56 Tab 3   • SPIRIVA HANDIHALER 18 MCG Cap INHALE 1 CAP BY MOUTH EVERY DAY. AT THE SAME TIME EVERY DAY 90 Cap 3   • ipratropium-albuterol (DUONEB) 0.5-2.5 (3) MG/3ML nebulizer solution USE 1 VIAL INTO NEBULIZER 2 TIMES DAILY AS NEEDED FOR SHORTNESS OF BREATH 180 mL 11   • Fluticasone Furoate-Vilanterol 100-25 MCG/INH AEROSOL POWDER, BREATH ACTIVATED Inhale 1 Puff by mouth every day. 3 Each 11   • aspirin EC 81 MG EC tablet Take 1 Tab by mouth every day. 100 Tab 0     No current facility-administered medications for this visit.       Allergies as of 07/07/2017   • (No Known Allergies)       Social History     Social History   • Marital Status:      Spouse Name: N/A   • Number of Children: N/A   • Years of Education: N/A     Occupational History   • Not on file.     Social History Main Topics   •  "Smoking status: Current Every Day Smoker -- 0.25 packs/day for 20 years     Types: Cigarettes     Last Attempt to Quit: 08/01/2014   • Smokeless tobacco: Never Used   • Alcohol Use: 0.0 oz/week     0 Standard drinks or equivalent per week      Comment: occ   • Drug Use: No   • Sexual Activity: Not on file      Comment: single retired      Other Topics Concern   • Not on file     Social History Narrative        Family History   Problem Relation Age of Onset   • Cancer Neg Hx    • Diabetes Neg Hx    • Heart Disease Neg Hx    • Stroke Neg Hx    • Heart Attack Neg Hx        Past Surgical History   Procedure Laterality Date   • Arthroscopy, knee  1978     meniscus, right   • Hernia repair  child   • Tonsillectomy  child       ROS   All systems reviewed are negative except for HPI         Objective:     Blood pressure 110/72, pulse 80, temperature 37 °C (98.6 °F), resp. rate 16, height 1.727 m (5' 8\"), weight 59.421 kg (131 lb), SpO2 96 %. Body mass index is 19.92 kg/(m^2).   Physical Exam:  Constitutional: Alert, no distress.  Skin: Warm, dry, good turgor, no rashes in visible areas. Wart smaller but still there.   Eye: Equal, round and reactive, conjunctiva clear, lids normal.  ENMT: Lips without lesions, good dentition, oropharynx clear.  Neck: Trachea midline, no masses, no thyromegaly. No cervical or supraclavicular lymphadenopathy  Respiratory: Unlabored respiratory effort, lungs clear to auscultation, no wheezes, no ronchi.  Cardiovascular: Normal S1, S2, +  murmur, no edema.  Abdomen: Soft, non-tender, no masses, no hepatosplenomegaly.  Psych: Alert and oriented x3, normal affect and mood.  Musculoskeletal: Wrist/Hand:  Range of motion intact, he has positive herbeden  nodule positive. No sublulaxation         Assessment and Plan:   The following treatment plan was discussed    1. Primary osteoarthritis of both hands  Likely he has OA, lab work to rule out RA  - CCP ANTIBODY; Future  - SILVINO " ANTIBODY WITH REFLEX; Future  - WESTERGREN SED RATE; Future  - RHEUMATOID ARTHRITIS FACTOR; Future  - URIC ACID, SERUM  - CRP QUANTITIVE (NON-CARDIAC); Future    2. Viral warts, unspecified type  Cryotherapy next apt     3. Mucopurulent chronic bronchitis (CMS-HCC)  Stable, stopped smoking   - REFERRAL TO PULMONOLOGY  - PULMONARY FUNCTION TESTS Test requested: Complete Pulmonary Function Test    4. Lung mass  Lung scaring, pMA to follow rule out cancer   - REFERRAL TO PULMONOLOGY    5. Screening for AAA (abdominal aortic aneurysm)  - US-ABDOMINAL AORTA SCREEN AAA; Future    6. Tobacco use disorder  Stopped 4 days ago    7. Peripheral artery disease (CMS-HCC)  Consider CEM at next apt. Not symptomatic, pulses strong, feel stronger on the right side ??     8. Aortic stenosis, moderate  Asymptomatic. repeat echo next year       Followup: Return in about 4 weeks (around 8/4/2017) for Short.

## 2017-07-09 LAB
CCP IGG SERPL-ACNC: 8 UNITS (ref 0–19)
NUCLEAR IGG SER QL IA: NORMAL

## 2017-07-10 ENCOUNTER — TELEPHONE (OUTPATIENT)
Dept: MEDICAL GROUP | Facility: PHYSICIAN GROUP | Age: 72
End: 2017-07-10

## 2017-07-10 NOTE — TELEPHONE ENCOUNTER
Called and spoke with patient regarding  results. Patient was not aware of follow up appointment. SALVADOR

## 2017-07-10 NOTE — TELEPHONE ENCOUNTER
----- Message from Johanna Robertson M.D. sent at 7/10/2017 12:32 PM PDT -----  Please let patient know that his rheumatoid factor level was negative. However, some of the tests for inflammation were slightly elevated. I recommend that he keep his appointment with Dr. Mantilla next week to further discuss this.  Dr. Robertson covering for Dr. Mantilla

## 2017-07-14 ENCOUNTER — OFFICE VISIT (OUTPATIENT)
Dept: PULMONOLOGY | Facility: HOSPICE | Age: 72
End: 2017-07-14
Payer: MEDICARE

## 2017-07-14 VITALS
RESPIRATION RATE: 14 BRPM | DIASTOLIC BLOOD PRESSURE: 80 MMHG | HEART RATE: 63 BPM | WEIGHT: 130 LBS | SYSTOLIC BLOOD PRESSURE: 124 MMHG | HEIGHT: 68 IN | OXYGEN SATURATION: 99 % | TEMPERATURE: 98.4 F | BODY MASS INDEX: 19.7 KG/M2

## 2017-07-14 DIAGNOSIS — F17.200 TOBACCO USE DISORDER: ICD-10-CM

## 2017-07-14 DIAGNOSIS — R91.8 LUNG MASS: ICD-10-CM

## 2017-07-14 DIAGNOSIS — I35.0 AORTIC STENOSIS, MODERATE: ICD-10-CM

## 2017-07-14 DIAGNOSIS — J41.1 MUCOPURULENT CHRONIC BRONCHITIS (HCC): ICD-10-CM

## 2017-07-14 DIAGNOSIS — J44.89 COPD WITH ASTHMA: ICD-10-CM

## 2017-07-14 PROCEDURE — 99214 OFFICE O/P EST MOD 30 MIN: CPT | Performed by: INTERNAL MEDICINE

## 2017-07-14 NOTE — PATIENT INSTRUCTIONS
COPD, moderate , controlled; vaccinations reviewed, current    Lung mass resolved recent CT chest    Recheck one year

## 2017-07-14 NOTE — MR AVS SNAPSHOT
"        Remi Matt   2017 11:40 AM   Office Visit   MRN: [id number]    Department:  [facility]   Dept Phone:  [phone]    Description:  Male : 1945   Provider:  A Rotation           Reason for Visit     Follow-Up COPD, Review results      Allergies as of 2017     No Known Allergies      You were diagnosed with     COPD with asthma (CMS-HCC)   [596307]       Mucopurulent chronic bronchitis (CMS-[facility])   [491.1.ICD-9-CM]       Aortic stenosis, moderate   [594418]       Tobacco use disorder   [305.1.ICD-9-CM]       Lung mass   [420335]         Vital Signs     Blood Pressure Pulse Temperature Respirations Height Weight    124/80 mmHg 63 36.9 °C (98.4 °F) 14 1.727 m (5' 8\") 58.968 kg (130 lb)    Body Mass Index Oxygen Saturation Smoking Status             19.77 kg/m2 99% Former Smoker         Basic Information     Date Of Birth Sex Race Ethnicity Preferred Language    1945 Male  or  Non- English      Your appointments     2017 11:40 AM   Established Patient Pul with A Rotation   [facility] (--)    [address]  [address]  [place] [id number] [phone]            2017  2:40 PM   Established Patient with Christopher Mantilla M.D.   [facility] [place] (--)    [address]  [address]  [place] [id number] [phone]           You will be receiving a confirmation call a few days before your appointment from our automated call confirmation system.            2017  8:00 AM   US AORTA 30 with [address] US 2   [facility]ING - ULTRASOUND - [address])    [address]  [place] [id number] [phone]           Some exams require specific prep instructions that would have been given to you at time of scheduling. If you have any additional questions about the prep instructions, please call Imaging Scheduling at [phone] and press #2.            Aug 08, 2017 10:20 AM   Established Patient with " Christopher Mantilla M.D.   [facility] [place] (--)    [address]  [address]  [place] [id number] [phone]           You will be receiving a confirmation call a few days before your appointment from our automated call confirmation system.              Problem List              ICD-10-CM Priority Class Noted - Resolved    COPD (chronic obstructive pulmonary disease) (CMS-HCC) J44.9   Unknown - Present    Elevated PSA R97.20   3/27/2015 - Present    Peripheral artery disease (CMS-HCC) I73.9   9/24/2015 - Present    Aortic stenosis, moderate I35.0   9/30/2015 - Present    Tobacco use disorder F17.200   11/6/2015 - Present    Wart B07.9   12/22/2015 - Present    Primary osteoarthritis of hand M19.049   7/7/2017 - Present    Screening for AAA (abdominal aortic aneurysm) Z13.6   7/7/2017 - Present    Lung mass, not seen CT chest 6/17, resolved R91.8   7/14/2017 - Present      Health Maintenance        Date Due Completion Dates    IMM DTaP/Tdap/Td Vaccine (1 - Tdap) 10/16/1963 ---    IMM ZOSTER VACCINE 10/16/2004 ---    COLON CANCER SCREENING ANNUAL FIT 3/22/2017 3/22/2016    IMM INFLUENZA (1) 9/1/2017 11/4/2016, 9/18/2015    IMM PNEUMOCOCCAL 65+ (ADULT) LOW/MEDIUM RISK SERIES (2 of 2 - PCV13) 6/16/2018 6/16/2017            Current Immunizations     Influenza Vaccine Adult HD 11/4/2016  2:45 PM    Influenza Vaccine Quad Inj (Pf) 9/18/2015    Pneumococcal Vaccine (UF)Historical Data 9/18/2013    Pneumococcal polysaccharide vaccine (PPSV-23) 6/16/2017  3:19 PM      Below and/or attached are the medications your provider expects you to take. Review all of your home medications and newly ordered medications with your provider and/or pharmacist. Follow medication instructions as directed by your provider and/or pharmacist. Please keep your medication list with you and share with your provider. Update the information when medications are discontinued, doses are changed, or new medications (including over-the-counter  products) are added; and carry medication information at all times in the event of emergency situations     Allergies:  No Known Allergies          Medications  Valid as of: July 14, 2017 - 10:54 AM    Generic Name Brand Name Tablet Size Instructions for use    Albuterol Sulfate (Aero Soln) albuterol 108 (90 BASE) MCG/ACT USE 2 PUFFS BY INHALATIONS EVERY 6 HOURS AS NEEDED FOR SHORTNESS OF BREATH        Aspirin (Tablet Delayed Response) aspirin 81 MG Take 1 Tab by mouth every day.        Fluticasone Furoate-Vilanterol (AEROSOL POWDER, BREATH ACTIVATED) Fluticasone Furoate-Vilanterol 100-25 MCG/INH Inhale 1 Puff by mouth every day.        Ipratropium-Albuterol (Solution) DUONEB 0.5-2.5 (3) MG/3ML USE 1 VIAL INTO NEBULIZER 2 TIMES DAILY AS NEEDED FOR SHORTNESS OF BREATH        Tiotropium Bromide Monohydrate (Cap) SPIRIVA HANDIHALER 18 MCG INHALE 1 CAP BY MOUTH EVERY DAY. AT THE SAME TIME EVERY DAY        Varenicline Tartrate (Misc) CHANTIX SANDRA 0.5 MG X 11 & 1 MG X 42 0.5 mg by mouth once daily for 3 days, then 0.5 mg by mouth twice daily for 4 days, then 1 mg by mouth once daily.        .                 Medicines prescribed today were sent to:     [facility]/PHARMACY #[id number] - [place] - [address]    [address] [place] [address]    Phone: [phone] Fax: [phone]    Open 24 Hours?: No    [facility]/PHARMACY #[id number] - [place] - [address]    [address] [place] [address]    Phone: [phone] Fax: [phone]    Open 24 Hours?: No    [facility] - [place], [place] - [address] AT PORTAL TO REGISTERED [facility] SITES    [facility] [facility] [address]    Phone: [phone] Fax: [phone]    Open 24 Hours?: No      Medication refill instructions:       If your prescription bottle indicates you have medication refills left, it is not necessary to call your provider’s office. Please contact your pharmacy and they will refill your medication.    If your prescription bottle  indicates you do not have any refills left, you may request refills at any time through one of the following ways: The online [employer] system (except Urgent Care), by calling your provider’s office, or by asking your pharmacy to contact your provider’s office with a refill request. Medication refills are processed only during regular business hours and may not be available until the next business day. Your provider may request additional information or to have a follow-up visit with you prior to refilling your medication.   *Please Note: Medication refills are assigned a new Rx number when refilled electronically. Your pharmacy may indicate that no refills were authorized even though a new prescription for the same medication is available at the pharmacy. Please request the medicine by name with the pharmacy before contacting your provider for a refill.        Instructions    COPD, moderate , controlled; vaccinations reviewed, current    Lung mass resolved recent CT chest    Recheck one year          MyChart Status: Patient Declined

## 2017-07-14 NOTE — PROGRESS NOTES
Remi Matt is a 71 y.o. male here for COPD and possible lung mass. Patient was referred by his primary care doctor.    History of Present Illness:     This patient comes in to follow-up on COPD, actually doing fairly well with lung function testing showed moderate obstruction. There is no significant difference or change over the past 2 years. Oxygen transfer is adequate, he does not require oxygen and remains in good shape with slender build.    He did stop smoking 5 days ago, this is strongly encouraged.    He continues to use Brio, Spiriva and occasional DuoNeb. Prescriptions are up-to-date.    Daily mucopurulence, occasional trace heme with deep cough, but recent CAT scan showed no evidence of malignancy and stopping smoking is strongly encouraged. He will let me know if his pattern changes or worsens.    Constitutional ROS: No unexpected change in weight, No unexplained fevers  Eyes: No change in vision or blurring or double vision  Mouth/Throat ROS: No sore throat, No recent change in voice or hoarseness  Pulmonary ROS: See present history for pertinent positives  Cardiovascular ROS: No chest pain to suggest acute coronary syndrome  Gastrointestinal ROS: No abdominal pain to suggest peptic disease  Musculoskeletal/Extremities ROS: no acute artritis or unusual swelling  Hematologic/Lymphatic ROS: No easy bleeding or unusual lymph node swelling  Neurologic ROS: No new or unusual weakness  Psychiatric ROS: No hallucinations  Allergic/Immunologic: No  urticaria or allergic rash      Current Outpatient Prescriptions   Medication Sig Dispense Refill   • albuterol (VENTOLIN HFA) 108 (90 BASE) MCG/ACT Aero Soln inhalation aerosol USE 2 PUFFS BY INHALATIONS EVERY 6 HOURS AS NEEDED FOR SHORTNESS OF BREATH 3 Inhaler 3   • varenicline (CHANTIX STARTING MONTH PAK) 0.5 MG X 11 & 1 MG X 42 tablet 0.5 mg by mouth once daily for 3 days, then 0.5 mg by mouth twice daily for 4 days, then 1 mg by mouth once daily. 56 Tab 3  "  • SPIRIVA HANDIHALER 18 MCG Cap INHALE 1 CAP BY MOUTH EVERY DAY. AT THE SAME TIME EVERY DAY 90 Cap 3   • ipratropium-albuterol (DUONEB) 0.5-2.5 (3) MG/3ML nebulizer solution USE 1 VIAL INTO NEBULIZER 2 TIMES DAILY AS NEEDED FOR SHORTNESS OF BREATH 180 mL 11   • Fluticasone Furoate-Vilanterol 100-25 MCG/INH AEROSOL POWDER, BREATH ACTIVATED Inhale 1 Puff by mouth every day. 3 Each 11   • aspirin EC 81 MG EC tablet Take 1 Tab by mouth every day. 100 Tab 0     No current facility-administered medications for this visit.       Social History   Substance Use Topics   • Smoking status: Former Smoker -- 0.25 packs/day for 20 years     Types: Cigarettes     Quit date: 07/07/2017   • Smokeless tobacco: Never Used   • Alcohol Use: 0.0 oz/week     0 Standard drinks or equivalent per week      Comment: occ        Past Medical History   Diagnosis Date   • COPD (chronic obstructive pulmonary disease) (CMS-[facility])        Past Surgical History   Procedure Laterality Date   • Arthroscopy, knee  1978     meniscus, right   • Hernia repair  child   • Tonsillectomy  child       Allergies: Review of patient's allergies indicates no known allergies.    Family History   Problem Relation Age of Onset   • Cancer Neg Hx    • Diabetes Neg Hx    • Heart Disease Neg Hx    • Stroke Neg Hx    • Heart Attack Neg Hx        Physical Examination    Filed Vitals:    07/14/17 1033   Height: 1.727 m (5' 8\")   Weight: 58.968 kg (130 lb)   Weight % change since last entry.: 0 %   BP: 124/80   Pulse: 63   BMI (Calculated): 19.77   Resp: 14   Temp: 36.9 °C (98.4 °F)       General Appearance: alert, no distress  Skin: Skin color, texture, turgor normal. No rashes or lesions.  Eyes: negative  Oropharynx: Lips, mucosa, and tongue normal. Teeth and gums normal. Oropharynx moist and without lesion  Lungs: positive findings: Quiet but clear  Heart: negative. RRR without murmur, gallop, or rubs.  No ectopy.  Abdomen: Abdomen soft, non-tender. . No masses,  No " "organomegaly  Extremities:  No deformities, edema, or skin discoloration  Joints: No acute arthritis  Peripheral Pulses:perfused  Neurologic: intact grossly  Cyanosis or clubbing    II (soft palate, uvula, fauces visible)    Imaging: CAT scan shows resolution of the previously described lung mass, may have been infiltrate or pneumonitis; report indicates \"no evidence of lung mass\"    PFTS: Reviewed      Assessment and Plan  1. COPD with asthma (CMS-[facility])      2. Mucopurulent chronic bronchitis (CMS-[facility])      3. Aortic stenosis, moderate      4. Tobacco use disorder      5. Lung mass, not seen CT chest 6/17, resolved        Followup Return in about 1 year (around 7/14/2018) for With MD or APRN.      "

## 2017-07-18 ENCOUNTER — OFFICE VISIT (OUTPATIENT)
Dept: MEDICAL GROUP | Facility: PHYSICIAN GROUP | Age: 72
End: 2017-07-18
Payer: MEDICARE

## 2017-07-18 VITALS
SYSTOLIC BLOOD PRESSURE: 112 MMHG | HEIGHT: 68 IN | TEMPERATURE: 98.8 F | OXYGEN SATURATION: 96 % | DIASTOLIC BLOOD PRESSURE: 72 MMHG | RESPIRATION RATE: 16 BRPM | WEIGHT: 129 LBS | BODY MASS INDEX: 19.55 KG/M2 | HEART RATE: 82 BPM

## 2017-07-18 DIAGNOSIS — M19.90 ARTHRITIS: ICD-10-CM

## 2017-07-18 DIAGNOSIS — I35.0 AORTIC STENOSIS, MODERATE: ICD-10-CM

## 2017-07-18 DIAGNOSIS — I73.9 PERIPHERAL ARTERY DISEASE (HCC): ICD-10-CM

## 2017-07-18 DIAGNOSIS — J41.1 MUCOPURULENT CHRONIC BRONCHITIS (HCC): ICD-10-CM

## 2017-07-18 DIAGNOSIS — F17.200 TOBACCO USE DISORDER: ICD-10-CM

## 2017-07-18 DIAGNOSIS — B07.9 VIRAL WARTS, UNSPECIFIED TYPE: ICD-10-CM

## 2017-07-18 DIAGNOSIS — R91.8 LUNG MASS: ICD-10-CM

## 2017-07-18 DIAGNOSIS — Z13.6 SCREENING FOR AAA (ABDOMINAL AORTIC ANEURYSM): ICD-10-CM

## 2017-07-18 PROCEDURE — 99214 OFFICE O/P EST MOD 30 MIN: CPT | Mod: 25 | Performed by: INTERNAL MEDICINE

## 2017-07-18 PROCEDURE — 17110 DESTRUCTION B9 LES UP TO 14: CPT | Performed by: INTERNAL MEDICINE

## 2017-07-18 NOTE — PROGRESS NOTES
Subjective:   Remi Matt is a 71 y.o. male here today for lab work review, cryotherapy    Tobacco use disorder  10 days off smoking, he is not missing. Encouraged to continue with cessation.     Screening for AAA (abdominal aortic aneurysm)  He is going tomorrow for US screening    Arthritis  He continues to wake up and has pain on both hands, feeling stiff. Tried tylenol, worked first few days. Now he is taking sometimes 3 tabs. Sometimes he will take the pain and get less tylenol, sometimes uses ibuprofen prn. ESR and CRP slight elevated, but sherin, RF, CCP negative. He tells me that he has pain on shoudler, back, hand, feet., sometimes he cannot walk. He wakes up in the morning, he has a coffee. He takes the med and goes to sleep for one hour. He feels better afterward.     Lung mass, not seen CT chest 6/17, resolved  Scar tissue, possible pneumonitis. Needs repeat CT scan possible next year.     Peripheral artery disease (CMS-HCC)  He denies claudication. Carotid Doppler 2015 showed mild atherosclerotic plaque involving bilateral internal carotid arteries . Pedal pulses intact     COPD (chronic obstructive pulmonary disease)  She was seen by PMA. Well controled on current inhalor. Stable. Follow up with PMA In one year. Not sure when he is schedule for PFTs    Aortic stenosis, moderate  Moderate, asymptomatic. Echo 2015         Current medicines (including changes today)  Current Outpatient Prescriptions   Medication Sig Dispense Refill   • albuterol (VENTOLIN HFA) 108 (90 BASE) MCG/ACT Aero Soln inhalation aerosol USE 2 PUFFS BY INHALATIONS EVERY 6 HOURS AS NEEDED FOR SHORTNESS OF BREATH 3 Inhaler 3   • varenicline (CHANTIX STARTING MONTH PAK) 0.5 MG X 11 & 1 MG X 42 tablet 0.5 mg by mouth once daily for 3 days, then 0.5 mg by mouth twice daily for 4 days, then 1 mg by mouth once daily. 56 Tab 3   • aspirin EC 81 MG EC tablet Take 1 Tab by mouth every day. 100 Tab 0   • SPIRIVA HANDIHALER 18 MCG Cap INHALE  1 CAP BY MOUTH EVERY DAY. AT THE SAME TIME EVERY DAY 90 Cap 3   • ipratropium-albuterol (DUONEB) 0.5-2.5 (3) MG/3ML nebulizer solution USE 1 VIAL INTO NEBULIZER 2 TIMES DAILY AS NEEDED FOR SHORTNESS OF BREATH 180 mL 11   • Fluticasone Furoate-Vilanterol 100-25 MCG/INH AEROSOL POWDER, BREATH ACTIVATED Inhale 1 Puff by mouth every day. 3 Each 11     No current facility-administered medications for this visit.     He  has a past medical history of COPD (chronic obstructive pulmonary disease) (CMS-[facility]).    Current Outpatient Prescriptions   Medication Sig Dispense Refill   • albuterol (VENTOLIN HFA) 108 (90 BASE) MCG/ACT Aero Soln inhalation aerosol USE 2 PUFFS BY INHALATIONS EVERY 6 HOURS AS NEEDED FOR SHORTNESS OF BREATH 3 Inhaler 3   • varenicline (CHANTIX STARTING MONTH PAK) 0.5 MG X 11 & 1 MG X 42 tablet 0.5 mg by mouth once daily for 3 days, then 0.5 mg by mouth twice daily for 4 days, then 1 mg by mouth once daily. 56 Tab 3   • aspirin EC 81 MG EC tablet Take 1 Tab by mouth every day. 100 Tab 0   • SPIRIVA HANDIHALER 18 MCG Cap INHALE 1 CAP BY MOUTH EVERY DAY. AT THE SAME TIME EVERY DAY 90 Cap 3   • ipratropium-albuterol (DUONEB) 0.5-2.5 (3) MG/3ML nebulizer solution USE 1 VIAL INTO NEBULIZER 2 TIMES DAILY AS NEEDED FOR SHORTNESS OF BREATH 180 mL 11   • Fluticasone Furoate-Vilanterol 100-25 MCG/INH AEROSOL POWDER, BREATH ACTIVATED Inhale 1 Puff by mouth every day. 3 Each 11     No current facility-administered medications for this visit.       Allergies as of 07/18/2017   • (No Known Allergies)       Social History     Social History   • Marital Status:      Spouse Name: N/A   • Number of Children: N/A   • Years of Education: N/A     Occupational History   • Not on file.     Social History Main Topics   • Smoking status: Former Smoker -- 0.25 packs/day for 20 years     Types: Cigarettes     Quit date: 07/07/2017   • Smokeless tobacco: Never Used   • Alcohol Use: 0.0 oz/week     0 Standard drinks or  "equivalent per week      Comment: occ   • Drug Use: No   • Sexual Activity: Not on file      Comment: single retired      Other Topics Concern   • Not on file     Social History Narrative        Family History   Problem Relation Age of Onset   • Cancer Neg Hx    • Diabetes Neg Hx    • Heart Disease Neg Hx    • Stroke Neg Hx    • Heart Attack Neg Hx        Past Surgical History   Procedure Laterality Date   • Arthroscopy, knee  1978     meniscus, right   • Hernia repair  child   • Tonsillectomy  child       ROS   All systems reviewed are negative except for HPI         Objective:     Blood pressure 112/72, pulse 82, temperature 37.1 °C (98.8 °F), resp. rate 16, height 1.727 m (5' 8\"), weight 58.514 kg (129 lb), SpO2 96 %. Body mass index is 19.62 kg/(m^2).   Physical Exam:  Constitutional: Alert, no distress.  Skin: Warm, dry, good turgor, no rashes in visible areas. He has plantar wart on left second finger   Eye: Equal, round and reactive, conjunctiva clear, lids normal.  ENMT: Lips without lesions, good dentition, oropharynx clear.  Neck: Trachea midline, no masses, no thyromegaly. No cervical or supraclavicular lymphadenopathy  Respiratory: Unlabored respiratory effort, lungs clear to auscultation, no wheezes, no ronchi.  Cardiovascular: Normal S1, S2, + 3/6 murmur, no edema.  Abdomen: Soft, non-tender, no masses, no hepatosplenomegaly.  Psych: Alert and oriented x3, normal affect and mood.        Assessment and Plan:   The following treatment plan was discussed    1. Aortic stenosis, moderate  Repeat echocardiogram next year., asymptomatic     2. Mucopurulent chronic bronchitis (CMS-HCC)  Stable. On proper treatment, follow up with [facility]     3. Lung mass, not seen CT chest 6/17, resolved  Resolved, possible pneumonitis ?? Not symptomatic.     4. Peripheral artery disease (CMS-HCC)  Stable. No claudication. No significant carotid stenosis     5. Viral warts, unspecified " type  CRYOTHERAPY:  Discussed risks and benefits of cryotherapy. Patient verbally agreed. 3 applications of cryotherapy were applied to  lesion on second left finger. Patient tolerated procedure well. Aftercare instructions given.      6. Tobacco use disorder  On chantix, not smoking     7. Screening for AAA (abdominal aortic aneurysm)  Follow up US     8. Arthritis  OA. It is not RA. Tylenol prn, or ibuprfen. If worse consider rheumatology referral       Followup: Return in about 2 months (around 9/18/2017).

## 2017-07-18 NOTE — ASSESSMENT & PLAN NOTE
He continues to wake up and has pain on both hands, feeling stiff. Tried tylenol, worked first few days. Now he is taking sometimes 3 tabs. Sometimes he will take the pain and get less tylenol, sometimes uses ibuprofen prn. ESR and CRP slight elevated, but sherin, RF, CCP negative. He tells me that he has pain on shoudler, back, hand, feet., sometimes he cannot walk. He wakes up in the morning, he has a coffee. He takes the med and goes to sleep for one hour. He feels better afterward.

## 2017-07-18 NOTE — MR AVS SNAPSHOT
"        Remi Matt   2017 2:40 PM   Office Visit   MRN: [id number]    Department:  [facility]   Dept Phone:  [phone]    Description:  Male : 1945   Provider:  Christopher Mantilla M.D.           Reason for Visit     Results labs rhuematoid test       Allergies as of 2017     No Known Allergies      You were diagnosed with     Aortic stenosis, moderate   [645366]       Mucopurulent chronic bronchitis (CMS-HCC)   [491.1.ICD-9-CM]       Lung mass   [930160]       Peripheral artery disease (CMS-[facility])   [518846]       Viral warts, unspecified type   [9674853]       Tobacco use disorder   [305.1.ICD-9-CM]       Screening for AAA (abdominal aortic aneurysm)   [669657]         Vital Signs     Blood Pressure Pulse Temperature Respirations Height Weight    112/72 mmHg 82 37.1 °C (98.8 °F) 16 1.727 m (5' 8\") 58.514 kg (129 lb)    Body Mass Index Oxygen Saturation Smoking Status             19.62 kg/m2 96% Former Smoker         Basic Information     Date Of Birth Sex Race Ethnicity Preferred Language    1945 Male  or  Non- English      Your appointments     2017  8:00 AM   US AORTA 30 with [address] US 2   [facility]AG[facility] - ULTRASOUND - [address]    [address]  [place] [id number] [phone]           Some exams require specific prep instructions that would have been given to you at time of scheduling. If you have any additional questions about the prep instructions, please call Imaging Scheduling at [phone] and press #2.            Aug 08, 2017 10:20 AM   Establ[facility]    [address]  [address]  [place] [address] [phone]           You will be receiving a confirmation call a few days before your appointment from our automated call confirmation system.            Sep 19, 2017 10:00 AM   [facility]    [address]  [address]  [place] [address] [phone]           You will be receiving a confirmation call a few days before your appointment from our automated call confirmation system.              Problem List              ICD-10-CM Priority Class Noted - Resolved    COPD (chronic obstructive pulmonary disease) (CMS-HCC) J44.9   Unknown - Present    Elevated PSA R97.20   3/27/2015 - Present    Peripheral artery disease (CMS-HCC) I73.9   9/24/2015 - Present    Aortic stenosis, moderate I35.0   9/30/2015 - Present    Tobacco use disorder F17.200   11/6/2015 - Present    Wart B07.9   12/22/2015 - Present    Primary osteoarthritis of hand M19.049   7/7/2017 - Present    Screening for AAA (abdominal aortic aneurysm) Z13.6   7/7/2017 - Present    Lung mass, not seen CT chest 6/17, resolved R91.8   7/14/2017 - Present      Health Maintenance        Date Due Completion Dates    IMM DTaP/Tdap/Td Vaccine (1 - Tdap) 10/16/1963 ---    IMM ZOSTER VACCINE 10/16/2004 ---    COLON CANCER SCREENING ANNUAL FIT 3/22/2017 3/22/2016    IMM INFLUENZA (1) 9/1/2017 11/4/2016, 9/18/2015    IMM PNEUMOCOCCAL 65+ (ADULT) LOW/MEDIUM RISK SERIES (2 of 2 - PCV13) 6/16/2018 6/16/2017    COLONOSCOPY 9/30/2025 9/30/2015 (Declined)    Override on 9/30/2015: Patient Declined            Current Immunizations     Influenza Vaccine Adult HD 11/4/2016  2:45 PM    Influenza Vaccine Quad Inj (Pf) 9/18/2015    Pneumococcal Vaccine (UF)Historical Data 9/18/2013    Pneumococcal polysaccharide vaccine (PPSV-23) 6/16/2017  3:19 PM      Below and/or attached are the medications your provider expects you to take. Review all of your home medications and newly ordered medications with your provider and/or pharmacist. Follow medication instructions as directed by your provider and/or pharmacist. Please keep your medication list with you and share with your provider. Update the information when medications are discontinued, doses are changed, or new medications  (including over-the-counter products) are added; and carry medication information at all times in the event of emergency situations     Allergies:  No Known Allergies          Medications  Valid as of: July 18, 2017 -  3:14 PM    Generic Name Brand Name Tablet Size Instructions for use    Albuterol Sulfate (Aero Soln) albuterol 108 (90 BASE) MCG/ACT USE 2 PUFFS BY INHALATIONS EVERY 6 HOURS AS NEEDED FOR SHORTNESS OF BREATH        Aspirin (Tablet Delayed Response) aspirin 81 MG Take 1 Tab by mouth every day.        Fluticasone Furoate-Vilanterol (AEROSOL POWDER, BREATH ACTIVATED) Fluticasone Furoate-Vilanterol 100-25 MCG/INH Inhale 1 Puff by mouth every day.        Ipratropium-Albuterol (Solution) DUONEB 0.5-2.5 (3) MG/3ML USE 1 VIAL INTO NEBULIZER 2 TIMES DAILY AS NEEDED FOR SHORTNESS OF BREATH        Tiotropium Bromide Monohydrate (Cap) SPIRIVA HANDIHALER 18 MCG INHALE 1 CAP BY MOUTH EVERY DAY. AT THE SAME TIME EVERY DAY        Varenicline Tartrate (Misc) CHANTIX SANDRA 0.5 MG X 11 & 1 MG X 42 0.5 mg by mouth once daily for 3 days, then 0.5 mg by mouth twice daily for 4 days, then 1 mg by mouth once daily.        .                 Medicines prescribed today were sent to:     [facility]/PHARMACY #[id number] - [place] - [address]    [address] [place] [address]    Phone: [phone] Fax: [phone]    Open 24 Hours?: No    [id number] [place] - [address]    [address] [facility] [address]    Phone: [phone] Fax: [phone]    Open 24 Hours?: No    [facility] [place] - [address] AT PORTAL TO REGISTERED [facility] SITES    [address] [place] [facility] [address]    Phone: [phone] Fax: [phone]    Open 24 Hours?: No      Medication refill instructions:       If your prescription bottle indicates you have medication refills left, it is not necessary to call your provider’s office. Please contact your pharmacy and they will refill your medication.    If  your prescription bottle indicates you do not have any refills left, you may request refills at any time through one of the following ways: The online Edkimo system (except Urgent Care), by calling your provider’s office, or by asking your pharmacy to contact your provider’s office with a refill request. Medication refills are processed only during regular business hours and may not be available until the next business day. Your provider may request additional information or to have a follow-up visit with you prior to refilling your medication.   *Please Note: Medication refills are assigned a new Rx number when refilled electronically. Your pharmacy may indicate that no refills were authorized even though a new prescription for the same medication is available at the pharmacy. Please request the medicine by name with the pharmacy before contacting your provider for a refill.           MyChart Status: Patient Declined

## 2017-07-18 NOTE — ASSESSMENT & PLAN NOTE
She was seen by PMA. Well controled on current inhalor. Stable. Follow up with PMA In one year. Not sure when he is schedule for PFTs

## 2017-07-19 ENCOUNTER — APPOINTMENT (OUTPATIENT)
Dept: RADIOLOGY | Facility: MEDICAL CENTER | Age: 72
End: 2017-07-19
Attending: INTERNAL MEDICINE
Payer: MEDICARE

## 2017-07-31 ENCOUNTER — HOSPITAL ENCOUNTER (OUTPATIENT)
Dept: RADIOLOGY | Facility: MEDICAL CENTER | Age: 72
End: 2017-07-31
Attending: INTERNAL MEDICINE
Payer: MEDICARE

## 2017-07-31 DIAGNOSIS — Z13.6 SCREENING FOR AAA (ABDOMINAL AORTIC ANEURYSM): ICD-10-CM

## 2017-07-31 DIAGNOSIS — I71.40 ABDOMINAL AORTIC ANEURYSM (AAA) WITHOUT RUPTURE (HCC): ICD-10-CM

## 2017-07-31 PROCEDURE — 76775 US EXAM ABDO BACK WALL LIM: CPT

## 2017-08-02 ENCOUNTER — TELEPHONE (OUTPATIENT)
Dept: MEDICAL GROUP | Facility: PHYSICIAN GROUP | Age: 72
End: 2017-08-02

## 2017-08-02 NOTE — TELEPHONE ENCOUNTER
----- Message from Christopher Mantilla M.D. sent at 8/2/2017  3:08 PM PDT -----  Please let pt knows that he has no enlarged aorta. Normal US  Thank you,  Christopher Mantilla M.D.

## 2017-08-04 ENCOUNTER — TELEPHONE (OUTPATIENT)
Dept: MEDICAL GROUP | Facility: PHYSICIAN GROUP | Age: 72
End: 2017-08-04

## 2017-08-04 DIAGNOSIS — M19.90 ARTHRITIS: ICD-10-CM

## 2017-08-04 RX ORDER — MELOXICAM 7.5 MG/1
7.5 TABLET ORAL DAILY
Qty: 30 TAB | Refills: 3 | Status: SHIPPED | OUTPATIENT
Start: 2017-08-04 | End: 2017-09-29 | Stop reason: SDUPTHER

## 2017-08-04 NOTE — TELEPHONE ENCOUNTER
Message: Pt came in asking if it would be possible to get meloxicam 15 mg prescribed to him once daily. Please advise, thank you.

## 2017-08-14 ENCOUNTER — TELEPHONE (OUTPATIENT)
Dept: MEDICAL GROUP | Facility: PHYSICIAN GROUP | Age: 72
End: 2017-08-14

## 2017-08-14 NOTE — TELEPHONE ENCOUNTER
ESTABLISHED PATIENT PRE-VISIT PLANNING     Note: Patient will not be contacted if there is no indication to call.     1.  Reviewed notes from the last few office visits within the medical group: Yes    2.  If any orders were placed at last visit or intended to be done for this visit (i.e. 6 mos follow-up), do we have Results/Consult Notes?        •  Labs - Labs ordered, completed and results are in chart.       •  Imaging - Imaging ordered, completed and results are in chart.       •  Referrals - Referral ordered, patient was seen and consult notes are in chart. Care Teams updated  YES.    3. Is this appointment scheduled as a Hospital Follow-Up? No    4.  Immunizations were updated in [facility] using WebIZ?: Yes       •  Web Iz Recommendations: FLU, TDAP and ZOSTAVAX (Shingles)    5.  Patient is due for the following Health Maintenance Topics:   Health Maintenance Due   Topic Date Due   • Annual Wellness Visit  1945   • IMM DTaP/Tdap/Td Vaccine (1 - Tdap) 10/16/1963   • IMM ZOSTER VACCINE  10/16/2004   • COLON CANCER SCREENING ANNUAL FIT  03/22/2017   • PFT SCREENING-FEV1 AND FEV/FVC RATIO / SPIROMETRY SHOULD BE PERFORMED ANNUALLY  06/13/2017       - Patient has completed FLU and PNEUMOVAX (PPSV23) Immunization(s) per WebIZ. Chart has been updated.    6.  Patient was NOT informed to arrive 15 min prior to their scheduled appointment and bring in their medication bottles.

## 2017-08-15 ENCOUNTER — OFFICE VISIT (OUTPATIENT)
Dept: MEDICAL GROUP | Facility: PHYSICIAN GROUP | Age: 72
End: 2017-08-15
Payer: MEDICARE

## 2017-08-15 VITALS
HEIGHT: 68 IN | TEMPERATURE: 98.1 F | DIASTOLIC BLOOD PRESSURE: 72 MMHG | WEIGHT: 131 LBS | BODY MASS INDEX: 19.85 KG/M2 | RESPIRATION RATE: 16 BRPM | OXYGEN SATURATION: 97 % | SYSTOLIC BLOOD PRESSURE: 126 MMHG | HEART RATE: 63 BPM

## 2017-08-15 DIAGNOSIS — Q89.9 UMBILICAL ABNORMALITY: ICD-10-CM

## 2017-08-15 PROCEDURE — 99214 OFFICE O/P EST MOD 30 MIN: CPT | Performed by: NURSE PRACTITIONER

## 2017-08-15 ASSESSMENT — PAIN SCALES - GENERAL: PAINLEVEL: NO PAIN

## 2017-08-15 NOTE — MR AVS SNAPSHOT
"        Remi Matt   8/15/2017 8:40 AM   Office Visit   MRN: [id number]    Department:  [facility]   Dept Phone:  [phone]    Description:  Male : 1945   Provider:  JEANMARIE Cochran           Reason for Visit     Other Belly Button sticking out x 1 month       Allergies as of 8/15/2017     No Known Allergies      You were diagnosed with     Umbilical abnormality   [500136]         Vital Signs     Blood Pressure Pulse Temperature Respirations Height Weight    126/72 mmHg 63 36.7 °C (98.1 °F) 16 1.727 m (5' 7.99\") 59.421 kg (131 lb)    Body Mass Index Oxygen Saturation Smoking Status             19.92 kg/m2 97% Former Smoker         Basic Information     Date Of Birth Sex Race Ethnicity Preferred Language    1945 Male  or  Non- English      Your appointments     Aug 15, 2017  2:00 PM   US BODY 30 with [place] US 1   IMAGING [place] ([place]    [address]  [facility] [address] [phone]            Sep 19, 2017 10:00 AM   Established Patient with Christopher Mantilla M.D.   [facility] (--)    [address]  [address]  [facility] [address] [phone]           You will be receiving a confirmation call a few days before your appointment from our automated call confirmation system.              Problem List              ICD-10-CM Priority Class Noted - Resolved    COPD (chronic obstructive pulmonary disease) (CMS-HCC) J44.9   Unknown - Present    Elevated PSA R97.20   3/27/2015 - Present    Peripheral artery disease (CMS-HCC) I73.9   2015 - Present    Aortic stenosis, moderate I35.0   2015 - Present    Tobacco use disorder F17.200   2015 - Present    Wart B07.9   2015 - Present    Arthritis M19.90   2017 - Present    Screening for AAA (abdominal aortic aneurysm) Z13.6   2017 - Present    Lung mass, not seen CT chest , resolved R91.8   2017 - Present    Umbilical abnormality Q89.9   " 8/15/2017 - Present      Health Maintenance        Date Due Completion Dates    IMM DTaP/Tdap/Td Vaccine (1 - Tdap) 10/16/1963 ---    IMM ZOSTER VACCINE 10/16/2004 ---    COLON CANCER SCREENING ANNUAL FIT 3/22/2017 3/22/2016    IMM INFLUENZA (1) 9/1/2017 11/4/2016, 9/18/2015, 9/22/2014, 10/9/2012, 10/6/2011, 9/22/2010    IMM PNEUMOCOCCAL 65+ (ADULT) LOW/MEDIUM RISK SERIES (2 of 2 - PCV13) 6/16/2018 6/16/2017            Current Immunizations     Influenza TIV (IM) 9/22/2010    Influenza Vaccine Adult HD 11/4/2016  2:45 PM, 11/4/2016, 9/22/2014    Influenza Vaccine Quad Inj (Pf) 9/18/2015    Influenza Vaccine Quad Inj (Preserved) 10/9/2012, 10/6/2011    Pneumococcal Vaccine (UF)Historical Data 9/18/2013    Pneumococcal polysaccharide vaccine (PPSV-23) 6/16/2017  3:19 PM      Below and/or attached are the medications your provider expects you to take. Review all of your home medications and newly ordered medications with your provider and/or pharmacist. Follow medication instructions as directed by your provider and/or pharmacist. Please keep your medication list with you and share with your provider. Update the information when medications are discontinued, doses are changed, or new medications (including over-the-counter products) are added; and carry medication information at all times in the event of emergency situations     Allergies:  No Known Allergies          Medications  Valid as of: August 15, 2017 -  9:16 AM    Generic Name Brand Name Tablet Size Instructions for use    Albuterol Sulfate (Aero Soln) albuterol 108 (90 BASE) MCG/ACT USE 2 PUFFS BY INHALATIONS EVERY 6 HOURS AS NEEDED FOR SHORTNESS OF BREATH        Aspirin (Tablet Delayed Response) aspirin 81 MG Take 1 Tab by mouth every day.        Fluticasone Furoate-Vilanterol (AEROSOL POWDER, BREATH ACTIVATED) Fluticasone Furoate-Vilanterol 100-25 MCG/INH Inhale 1 Puff by mouth every day.        Ipratropium-Albuterol (Solution) DUONEB 0.5-2.5 (3) MG/3ML USE  1 VIAL INTO NEBULIZER 2 TIMES DAILY AS NEEDED FOR SHORTNESS OF BREATH        Meloxicam (Tab) MOBIC 7.5 MG Take 1 Tab by mouth every day.        Tiotropium Bromide Monohydrate (Cap) SPIRIVA HANDIHALER 18 MCG INHALE 1 CAP BY MOUTH EVERY DAY. AT THE SAME TIME EVERY DAY        Varenicline Tartrate (Misc) CHANTIX SANDRA 0.5 MG X 11 & 1 MG X 42 0.5 mg by mouth once daily for 3 days, then 0.5 mg by mouth twice daily for 4 days, then 1 mg by mouth once daily.        .                 Medicines prescribed today were sent to:     [facility]/PHARMACY #[id number] - [place] - [address]    [address] [place] [address]    Phone: [phone] Fax: [phone]    Open 24 Hours?: No    [facility] #[id number] - [place] - [address]    [address] [facility] [address]    Phone: [phone] Fax: [phone]    Open 24 Hours?: No    [facility] - [place] - [address] AT PORTAL TO REGISTERED [facility] SITES    [address] [facility] [address]    Phone: [phone] Fax: [phone]    Open 24 Hours?: No      Medication refill instructions:       If your prescription bottle indicates you have medication refills left, it is not necessary to call your provider’s office. Please contact your pharmacy and they will refill your medication.    If your prescription bottle indicates you do not have any refills left, you may request refills at any time through one of the following ways: The online Dopplr system (except Urgent Care), by calling your provider’s office, or by asking your pharmacy to contact your provider’s office with a refill request. Medication refills are processed only during regular business hours and may not be available until the next business day. Your provider may request additional information or to have a follow-up visit with you prior to refilling your medication.   *Please Note: Medication refills are assigned a new Rx number when refilled electronically. Your pharmacy may indicate  that no refills were authorized even though a new prescription for the same medication is available at the pharmacy. Please request the medicine by name with the pharmacy before contacting your provider for a refill.        Your To Do List     Future Labs/Procedures Complete By [employer] LIMITED  As directed 8/15/2018         MyChart Status: Patient Declined

## 2017-08-15 NOTE — ASSESSMENT & PLAN NOTE
"This is a new problem. Patient states that he noticed it 3 months ago but states that he is unsure if it started 3 months ago or if it was there prior. States he believes it has increased in size. states he noticed a small dark \"thing\" sticking out of his bellybutton. Denies pain, bleeding or irritation of the lesion. Patient does not report any soft mass or lump above her around the umbilicus. Does not use any erythema, pain. States that his friend was seen regarding a similar issue and sent immediately to the hospital for surgery. Patient is concerned that this is an umbilical hernia.  "

## 2017-08-16 NOTE — PROGRESS NOTES
"Chief Complaint   Patient presents with   • Other     Belly Button sticking out x 1 month        HISTORY OF PRESENT ILLNESS: Patient is a 71 y.o. male established patient who presents today to discuss umbilical growth.    Umbilical abnormality  This is a new problem. Patient states that he noticed it 3 months ago but states that he is unsure if it started 3 months ago or if it was there prior. States he believes it has increased in size. states he noticed a small dark \"thing\" sticking out of his bellybutton. Denies pain, bleeding or irritation of the lesion. Patient does not report any soft mass or lump above her around the umbilicus. Does not use any erythema, pain. States that his friend was seen regarding a similar issue and sent immediately to the hospital for surgery. Patient is concerned that this is an umbilical hernia.      Patient Active Problem List    Diagnosis Date Noted   • Umbilical abnormality 08/15/2017   • Lung mass, not seen CT chest 6/17, resolved 07/14/2017   • Arthritis 07/07/2017   • Screening for AAA (abdominal aortic aneurysm) 07/07/2017   • Wart 12/22/2015   • Tobacco use disorder 11/06/2015   • Aortic stenosis, moderate 09/30/2015   • Peripheral artery disease (CMS-HCC) 09/24/2015   • Elevated PSA 03/27/2015   • COPD (chronic obstructive pulmonary disease) (CMS-HCC)        Allergies:Review of patient's allergies indicates no known allergies.    Current Outpatient Prescriptions   Medication Sig Dispense Refill   • albuterol (VENTOLIN HFA) 108 (90 BASE) MCG/ACT Aero Soln inhalation aerosol USE 2 PUFFS BY INHALATIONS EVERY 6 HOURS AS NEEDED FOR SHORTNESS OF BREATH 3 Inhaler 3   • varenicline (CHANTIX STARTING MONTH PAK) 0.5 MG X 11 & 1 MG X 42 tablet 0.5 mg by mouth once daily for 3 days, then 0.5 mg by mouth twice daily for 4 days, then 1 mg by mouth once daily. 56 Tab 3   • SPIRIVA HANDIHALER 18 MCG Cap INHALE 1 CAP BY MOUTH EVERY DAY. AT THE SAME TIME EVERY DAY 90 Cap 3   • " ipratropium-albuterol (DUONEB) 0.5-2.5 (3) MG/3ML nebulizer solution USE 1 VIAL INTO NEBULIZER 2 TIMES DAILY AS NEEDED FOR SHORTNESS OF BREATH 180 mL 11   • Fluticasone Furoate-Vilanterol 100-25 MCG/INH AEROSOL POWDER, BREATH ACTIVATED Inhale 1 Puff by mouth every day. 3 Each 11   • aspirin EC 81 MG EC tablet Take 1 Tab by mouth every day. 100 Tab 0   • meloxicam (MOBIC) 7.5 MG Tab Take 1 Tab by mouth every day. 30 Tab 3     No current facility-administered medications for this visit.       Social History   Substance Use Topics   • Smoking status: Former Smoker -- 0.25 packs/day for 20 years     Types: Cigarettes     Quit date: 2017   • Smokeless tobacco: Never Used   • Alcohol Use: 0.0 oz/week     0 Standard drinks or equivalent per week      Comment: occ       Family Status   Relation Status Death Age   • Mother Alive    • Father       Family History   Problem Relation Age of Onset   • Cancer Neg Hx    • Diabetes Neg Hx    • Heart Disease Neg Hx    • Stroke Neg Hx    • Heart Attack Neg Hx        Review of Systems:   Constitutional:  Negative for fever, chills, weight loss and malaise/fatigue.   HEENT:  Negative for ear pain, nosebleeds, congestion, sore throat and neck pain.    Eyes:  Negative for blurred vision.   Respiratory:  Negative for cough, sputum production, shortness of breath and wheezing.    Cardiovascular:  Negative for chest pain, palpitations, orthopnea and leg swelling.   Gastrointestinal:  Negative for heartburn, nausea, vomiting and abdominal pain.   Genitourinary:  Negative for dysuria, urgency and frequency.   Musculoskeletal:  Negative for myalgias, back pain and joint pain.   Skin:  Negative for rash and itching.   Neurological:  Negative for dizziness, tingling, tremors, sensory change, focal weakness and headaches.   Endo/Heme/Allergies:  Does not bruise/bleed easily.   Psychiatric/Behavioral:  Negative for depression, suicidal ideas and memory loss.  The patient is not  "nervous/anxious and does not have insomnia.    All other systems reviewed and are negative except as in HPI.    Exam:  Blood pressure 126/72, pulse 63, temperature 36.7 °C (98.1 °F), resp. rate 16, height 1.727 m (5' 7.99\"), weight 59.421 kg (131 lb), SpO2 97 %.  General:  Normal appearing. No distress.  Pulmonary:  Clear to ausculation.  Normal effort. No rales, ronchi, or wheezing.  Cardiovascular:  Regular rate and rhythm without murmur. Carotid and radial pulses are intact and equal bilaterally.  Abdomen:  Soft, nontender, nondistended. Normal bowel sounds. Liver and spleen are not palpable. There is no erythema surrounding the umbilicus, no soft masses noted, no tenderness to palpation of the umbilicus. There is a small hard, rough, elongated piece of tissue protruding from the umbilicus, it is attached by a thin base, it is uncertain if the growth extends into the abdomen.   Neurologic:  Grossly nonfocal  Skin:  Warm and dry.  No obvious lesions.  Musculoskeletal:  Normal gait. No extremity cyanosis, clubbing, or edema.  Psych:  Normal mood and affect. Alert and oriented x3. Judgment and insight is normal.      Medical decision-making and discussion: Remi is a generally well-appearing 71-year-old male patient here today to discuss a growth in his belly button states that he was speaking with a friend who said he had a similar growth and was sent immediately to the hospital for surgery for an umbilical hernia. The growth protruding from the patient's abdomen is similar to a skin tag with a thin base attached at the floor of the umbilicus. Patient denies pain, nausea, vomiting, tenderness to palpation. No mass is noted on assessment, no erythema, swelling or bruising. Dr. Mcpherson is consulted for assessment and agrees with initial order of ultrasound to determine if growth extends into the abdomen. Counseled patient regarding signs of acute abdomen including pain, swelling, fever, chills, bleeding. Counseled " patient to follow-up if symptoms worsen or do not improve ultrasound is ordered urgently at this time. Patient is encouraged to be seen in the emergency room for chest pain, palpitations, shortness of breath, dizziness, severe abdominal pain or other concerning symptoms.      Please note that this dictation was created using voice recognition software. I have made every reasonable attempt to correct obvious errors, but I expect that there are errors of grammar and possibly content that I did not discover before finalizing the note.    Assessment/Plan:  1. Umbilical abnormality  US-ABDOMEN LIMITED          I have placed the below orders and discussed them with an approved delegating provider. The MA is performing the below orders under the direction of Dr. Mcpherson

## 2017-08-18 ENCOUNTER — TELEPHONE (OUTPATIENT)
Dept: MEDICAL GROUP | Facility: PHYSICIAN GROUP | Age: 72
End: 2017-08-18

## 2017-08-18 ENCOUNTER — HOSPITAL ENCOUNTER (OUTPATIENT)
Dept: RADIOLOGY | Facility: MEDICAL CENTER | Age: 72
End: 2017-08-18
Attending: NURSE PRACTITIONER
Payer: MEDICARE

## 2017-08-18 DIAGNOSIS — Q89.9 UMBILICAL ABNORMALITY: ICD-10-CM

## 2017-08-18 PROCEDURE — 76705 ECHO EXAM OF ABDOMEN: CPT

## 2017-08-18 NOTE — TELEPHONE ENCOUNTER
----- Message from JEANMARIE Cochran sent at 8/18/2017 10:41 AM PDT -----  Please call pt and give lab results: Abdominal ultrasound does not show any abnormality. There is no umbilical hernia, cystic or solid mass. I recommended following up with Dr. Mantilla regarding this issue.

## 2017-08-18 NOTE — TELEPHONE ENCOUNTER
VOICEMAIL  1. Caller Name: Remi Matt                        Call Back Number: [phone] (home)       2. Message: Called and left a message for patient to call back for lab results. LM     3. Patient approves office to leave a detailed voicemail/MyChart message: N\A

## 2017-08-21 ENCOUNTER — OFFICE VISIT (OUTPATIENT)
Dept: MEDICAL GROUP | Facility: PHYSICIAN GROUP | Age: 72
End: 2017-08-21
Payer: MEDICARE

## 2017-08-21 VITALS
RESPIRATION RATE: 16 BRPM | DIASTOLIC BLOOD PRESSURE: 68 MMHG | TEMPERATURE: 98.3 F | HEART RATE: 66 BPM | HEIGHT: 68 IN | OXYGEN SATURATION: 96 % | SYSTOLIC BLOOD PRESSURE: 122 MMHG | BODY MASS INDEX: 19.7 KG/M2 | WEIGHT: 130 LBS

## 2017-08-21 DIAGNOSIS — Q89.9 UMBILICAL ABNORMALITY: ICD-10-CM

## 2017-08-21 PROCEDURE — 99213 OFFICE O/P EST LOW 20 MIN: CPT | Performed by: NURSE PRACTITIONER

## 2017-08-21 ASSESSMENT — PAIN SCALES - GENERAL: PAINLEVEL: NO PAIN

## 2017-08-21 NOTE — MR AVS SNAPSHOT
"        Remi Matt   2017 7:40 AM   Office Visit   MRN: [id number]    Department:  [facility]   Dept Phone:  [phone]    Description:  Male : 1945   Provider:  JEANMARIE Cochran           Reason for Visit     Other Belly Button       Allergies as of 2017     No Known Allergies      Vital Signs     Blood Pressure Pulse Temperature Respirations Height Weight    122/68 mmHg 66 36.8 °C (98.3 °F) 16 1.727 m (5' 7.99\") 58.968 kg (130 lb)    Body Mass Index Oxygen Saturation Smoking Status             19.77 kg/m2 96% Former Smoker         Basic Information     Date Of Birth Sex Race Ethnicity Preferred Language    1945 Male  or  Non- English      Your appointments     Sep 19, 2017 10:00 AM   Established Patient with Christopher Mantilla M.D.   [facility] (--)    [address]  [address]  [facility] [id number] [phone]           You will be receiving a confirmation call a few days before your appointment from our automated call confirmation system.              Problem List              ICD-10-CM Priority Class Noted - Resolved    COPD (chronic obstructive pulmonary disease) (CMS-HCC) J44.9   Unknown - Present    Elevated PSA R97.20   3/27/2015 - Present    Peripheral artery disease (CMS-HCC) I73.9   2015 - Present    Aortic stenosis, moderate I35.0   2015 - Present    Tobacco use disorder F17.200   2015 - Present    Wart B07.9   2015 - Present    Arthritis M19.90   2017 - Present    Screening for AAA (abdominal aortic aneurysm) Z13.6   2017 - Present    Lung mass, not seen CT chest , resolved R91.8   2017 - Present    Umbilical abnormality Q89.9   8/15/2017 - Present      Health Maintenance        Date Due Completion Dates    IMM DTaP/Tdap/Td Vaccine (1 - Tdap) 10/16/1963 ---    IMM ZOSTER VACCINE 10/16/2004 ---    COLON CANCER SCREENING ANNUAL FIT 3/22/2017 3/22/2016    IMM INFLUENZA (1) 2017 " 11/4/2016, 9/18/2015, 9/22/2014, 10/9/2012, 10/6/2011, 9/22/2010    IMM PNEUMOCOCCAL 65+ (ADULT) LOW/MEDIUM RISK SERIES (2 of 2 - PCV13) 6/16/2018 6/16/2017            Current Immunizations     Influenza TIV (IM) 9/22/2010    Influenza Vaccine Adult HD 11/4/2016  2:45 PM, 11/4/2016, 9/22/2014    Influenza Vaccine Quad Inj (Pf) 9/18/2015    Influenza Vaccine Quad Inj (Preserved) 10/9/2012, 10/6/2011    Pneumococcal Vaccine (UF)Historical Data 9/18/2013    Pneumococcal polysaccharide vaccine (PPSV-23) 6/16/2017  3:19 PM      Below and/or attached are the medications your provider expects you to take. Review all of your home medications and newly ordered medications with your provider and/or pharmacist. Follow medication instructions as directed by your provider and/or pharmacist. Please keep your medication list with you and share with your provider. Update the information when medications are discontinued, doses are changed, or new medications (including over-the-counter products) are added; and carry medication information at all times in the event of emergency situations     Allergies:  No Known Allergies          Medications  Valid as of: August 21, 2017 -  7:55 AM    Generic Name Brand Name Tablet Size Instructions for use    Albuterol Sulfate (Aero Soln) albuterol 108 (90 Base) MCG/ACT USE 2 PUFFS BY INHALATIONS EVERY 6 HOURS AS NEEDED FOR SHORTNESS OF BREATH        Aspirin (Tablet Delayed Response) aspirin 81 MG Take 1 Tab by mouth every day.        Fluticasone Furoate-Vilanterol (AEROSOL POWDER, BREATH ACTIVATED) Fluticasone Furoate-Vilanterol 100-25 MCG/INH Inhale 1 Puff by mouth every day.        Ipratropium-Albuterol (Solution) DUONEB 0.5-2.5 (3) MG/3ML USE 1 VIAL INTO NEBULIZER 2 TIMES DAILY AS NEEDED FOR SHORTNESS OF BREATH        Meloxicam (Tab) MOBIC 7.5 MG Take 1 Tab by mouth every day.        Tiotropium Bromide Monohydrate (Cap) SPIRIVA HANDIHALER 18 MCG INHALE 1 CAP BY MOUTH EVERY DAY. AT THE SAME  TIME EVERY DAY        Varenicline Tartrate (Misc) CHANTIX SANDRA 0.5 MG X 11 & 1 MG X 42 0.5 mg by mouth once daily for 3 days, then 0.5 mg by mouth twice daily for 4 days, then 1 mg by mouth once daily.        .                 Medicines prescribed today were sent to:     [facility]ARM[facility]Y #[id number] - [place] - [address]    [address] [place] [address]    Phone: [phone] Fax: [phone]    Open 24 Hours?: No    [facility]/PHARMACY #[id number] - [place], [place] - [address]    [address] [facility] [address]    Phone: [phone] Fax: [phone]    Open 24 Hours?: No    [facility] - [place], [place] - [address] AT PORTAL TO REGISTERED [facility] SIT[address]    [address] [facility] [address]    Phone: [phone] Fax: [phone]    Open 24 Hours?: No      Medication refill instructions:       If your prescription bottle indicates you have medication refills left, it is not necessary to call your provider’s office. Please contact your pharmacy and they will refill your medication.    If your prescription bottle indicates you do not have any refills left, you may request refills at any time through one of the following ways: The online [employer] system (except Urgent Care), by calling your provider’s office, or by asking your pharmacy to contact your provider’s office with a refill request. Medication refills are processed only during regular business hours and may not be available until the next business day. Your provider may request additional information or to have a follow-up visit with you prior to refilling your medication.   *Please Note: Medication refills are assigned a new Rx number when refilled electronically. Your pharmacy may indicate that no refills were authorized even though a new prescription for the same medication is available at the pharmacy. Please request the medicine by name with the pharmacy before contacting your provider for a refill.           MyChart Status: Patient  Declined

## 2017-08-21 NOTE — ASSESSMENT & PLAN NOTE
Patient returns to clinic today with questions regarding the lesion protruding from his umbilicus. Discussed with patient that there was no indication of umbilical hernia or intraabdominal pathology on u/s. Discussed with patient that this lesion could represent a skin tag. Patient denies pain, nausea, vomiting, fever, chills, change in the lesion. Patient states that he has not noticed anything that makes it better or worse. States that it does not become caught on his clothing.

## 2017-08-21 NOTE — PROGRESS NOTES
Chief Complaint   Patient presents with   • Other     Belly Button        HISTORY OF PRESENT ILLNESS: Patient is a 71 y.o. male established patient who presents today to discuss and umbilical issue.    Umbilical abnormality  Patient returns to clinic today with questions regarding the lesion protruding from his umbilicus. Discussed with patient that there was no indication of umbilical hernia or intraabdominal pathology on u/s. Discussed with patient that this lesion could represent a skin tag. Patient denies pain, nausea, vomiting, fever, chills, change in the lesion. Patient states that he has not noticed anything that makes it better or worse. States that it does not become caught on his clothing.      Patient Active Problem List    Diagnosis Date Noted   • Umbilical abnormality 08/15/2017   • Lung mass, not seen CT chest 6/17, resolved 07/14/2017   • Arthritis 07/07/2017   • Screening for AAA (abdominal aortic aneurysm) 07/07/2017   • Wart 12/22/2015   • Tobacco use disorder 11/06/2015   • Aortic stenosis, moderate 09/30/2015   • Peripheral artery disease (CMS-HCC) 09/24/2015   • Elevated PSA 03/27/2015   • COPD (chronic obstructive pulmonary disease) (CMS-HCC)        Allergies:Review of patient's allergies indicates no known allergies.    Current Outpatient Prescriptions   Medication Sig Dispense Refill   • meloxicam (MOBIC) 7.5 MG Tab Take 1 Tab by mouth every day. 30 Tab 3   • albuterol (VENTOLIN HFA) 108 (90 BASE) MCG/ACT Aero Soln inhalation aerosol USE 2 PUFFS BY INHALATIONS EVERY 6 HOURS AS NEEDED FOR SHORTNESS OF BREATH 3 Inhaler 3   • varenicline (CHANTIX STARTING MONTH PAK) 0.5 MG X 11 & 1 MG X 42 tablet 0.5 mg by mouth once daily for 3 days, then 0.5 mg by mouth twice daily for 4 days, then 1 mg by mouth once daily. 56 Tab 3   • SPIRIVA HANDIHALER 18 MCG Cap INHALE 1 CAP BY MOUTH EVERY DAY. AT THE SAME TIME EVERY DAY 90 Cap 3   • ipratropium-albuterol (DUONEB) 0.5-2.5 (3) MG/3ML nebulizer solution USE  1 VIAL INTO NEBULIZER 2 TIMES DAILY AS NEEDED FOR SHORTNESS OF BREATH 180 mL 11   • Fluticasone Furoate-Vilanterol 100-25 MCG/INH AEROSOL POWDER, BREATH ACTIVATED Inhale 1 Puff by mouth every day. 3 Each 11   • aspirin EC 81 MG EC tablet Take 1 Tab by mouth every day. 100 Tab 0     No current facility-administered medications for this visit.       Social History   Substance Use Topics   • Smoking status: Former Smoker -- 0.25 packs/day for 20 years     Types: Cigarettes     Quit date: 2017   • Smokeless tobacco: Never Used   • Alcohol Use: 0.0 oz/week     0 Standard drinks or equivalent per week      Comment: occ       Family Status   Relation Status Death Age   • Mother Alive    • Father       Family History   Problem Relation Age of Onset   • Cancer Neg Hx    • Diabetes Neg Hx    • Heart Disease Neg Hx    • Stroke Neg Hx    • Heart Attack Neg Hx        Review of Systems:   Constitutional:  Negative for fever, chills, weight loss and malaise/fatigue.   HEENT:  Negative for ear pain, nosebleeds, congestion, sore throat and neck pain.    Eyes:  Negative for blurred vision.   Respiratory:  Negative for cough, sputum production, shortness of breath and wheezing.    Cardiovascular:  Negative for chest pain, palpitations, orthopnea and leg swelling.   Gastrointestinal:  Negative for heartburn, nausea, vomiting and abdominal pain.   Genitourinary:  Negative for dysuria, urgency and frequency.   Musculoskeletal:  Negative for myalgias, back pain and joint pain.   Skin:  Negative for rash and itching.   Neurological:  Negative for dizziness, tingling, tremors, sensory change, focal weakness and headaches.   Endo/Heme/Allergies:  Does not bruise/bleed easily.   Psychiatric/Behavioral:  Negative for depression, suicidal ideas and memory loss.  The patient is not nervous/anxious and does not have insomnia.    All other systems reviewed and are negative except as in HPI.    Exam:  Blood pressure 122/68, pulse 66,  "temperature 36.8 °C (98.3 °F), resp. rate 16, height 1.727 m (5' 7.99\"), weight 58.968 kg (130 lb), SpO2 96 %.  General:  Normal appearing. No distress.  Pulmonary:  Clear to ausculation.  Normal effort. No rales, ronchi, or wheezing.  Cardiovascular:  Regular rate and rhythm without murmur. Carotid and radial pulses are intact and equal bilaterally.  Abdomen:  Soft, nontender, nondistended. Normal bowel sounds. Liver and spleen are not palpable  Neurologic:  Grossly nonfocal  Lymph:  No cervical, supraclavicular or axillary lymph nodes are palpable  Skin:  Warm and dry. Small oval-shaped firm dark colored lesion noted to be protruding from the belly button unchanged from previous assessment.  Musculoskeletal:  Normal gait. No extremity cyanosis, clubbing, or edema.  Psych:  Normal mood and affect. Alert and oriented x3. Judgment and insight is normal.      Medical decision-making and discussion: Remi is generally well-appearing 71-year-old male patient here today to discuss lesion umbilicus discussed with patient that ultrasound did not show anything concerning, discussed option of completing CT scan for further workup patient declines at this time. Discussed with patient possible referral to dermatology for removal of this lesion patient declines at this time. Patient is requesting information on whether or not his insurance will cover for him to have a colonoscopy in [place]  patient that he would need to call [facility] and discuss this issue with them and if they will cover it in [place] he should let Dr. Mantilla know and she will be able to place a referral at that time. Patient is encouraged to be seen in the emergency room for chest pain, palpitations, shortness of breath, dizziness, severe abdominal pain or other concerning symptoms.    Patient was seen for 15 minutes face to face of which, greater than 50% was spent counseling regarding the above mentioned problems.    Please note that " this dictation was created using voice recognition software. I have made every reasonable attempt to correct obvious errors, but I expect that there are errors of grammar and possibly content that I did not discover before finalizing the note.    Assessment/Plan:  1. Umbilical abnormality            I have placed the below orders and discussed them with an approved delegating provider. The MA is performing the below orders under the direction of Dr. Mcpherson.

## 2017-09-29 DIAGNOSIS — M19.90 ARTHRITIS: ICD-10-CM

## 2017-09-29 RX ORDER — MELOXICAM 7.5 MG/1
7.5 TABLET ORAL DAILY
Qty: 30 TAB | Refills: 2 | Status: SHIPPED | OUTPATIENT
Start: 2017-09-29 | End: 2017-12-14 | Stop reason: SDUPTHER

## 2017-10-02 ENCOUNTER — PATIENT OUTREACH (OUTPATIENT)
Dept: HEALTH INFORMATION MANAGEMENT | Facility: OTHER | Age: 72
End: 2017-10-02

## 2017-10-02 NOTE — PROGRESS NOTES
Outcome: call back    Please transfer to Patient Outreach Team at [phone] when patient returns call.    WebIZ Checked & Epic Updated:  yes    HealthConnect Verified: yes    Attempt # 1

## 2017-10-04 ENCOUNTER — TELEPHONE (OUTPATIENT)
Dept: MEDICAL GROUP | Facility: PHYSICIAN GROUP | Age: 72
End: 2017-10-04

## 2017-10-04 RX ORDER — IPRATROPIUM BROMIDE AND ALBUTEROL 20; 100 UG/1; UG/1
SPRAY, METERED RESPIRATORY (INHALATION)
Qty: 1 INHALER | Refills: 3 | Status: SHIPPED | OUTPATIENT
Start: 2017-10-04 | End: 2018-06-01 | Stop reason: SDUPTHER

## 2017-10-04 NOTE — TELEPHONE ENCOUNTER
1. Pt called and asked if he can get gi consultants information to schedule. Pt coundnt find pen I offered to mail to his mothers house in [place] where he is staying. Referral sent mailed to [address] [place] [address]   to where he is staying.

## 2017-10-04 NOTE — PROGRESS NOTES
Outcome: didn't decline / not good timing     Please transfer to Patient Outreach Team at [phone] when patient returns call.    Attempt # 4

## 2017-10-25 NOTE — PROGRESS NOTES
Attempt #:4    WebIZ Checked & Epic Updated: Yes  · WebIZ Recommendations: TDAP and ZOSTAVAX (Shingles)  · Is patient due for Tdap? YES. Patient was not notified of copay/out of pocket cost.  · Is patient due for Shingles? YES. Patient was not notified of copay/out of pocket cost.  HealthConnect Verified: yes  Verify PCP: yes    Communication Preference Obtained: yes     Review Care Team: yes    Annual Wellness Visit Scheduling  1. Scheduling Status:Scheduled    Care Gap Scheduling (Attempt to Schedule EACH Overdue Care Gap!)     Health Maintenance Due   Topic Date Due   • Annual Wellness Visit  1945   • IMM DTaP/Tdap/Td Vaccine (1 - Tdap) 10/16/1963   • IMM ZOSTER VACCINE  10/16/2004   • COLON CANCER SCREENING ANNUAL FIT  03/22/2017   • PFT SCREENING-FEV1 AND FEV/FVC RATIO / SPIROMETRY SHOULD BE PERFORMED ANNUALLY  06/13/2017        Scheduled patient for Annual Wellness Visit and Immunizations: TDAP and ZOSTAVAX (Shingles)       [employer] Activation: declined  [employer]: no  Virtual Visits: no  Opt In to Text Messages: no

## 2017-12-04 ENCOUNTER — PATIENT OUTREACH (OUTPATIENT)
Dept: HEALTH INFORMATION MANAGEMENT | Facility: OTHER | Age: 72
End: 2017-12-04

## 2017-12-04 NOTE — LETTER
December 4, 2017        Remi Matt  [address] [place] [address]        Dear Remi:    I am sorry I have been unable to reach you via phone. As a clinical pharmacist with [facility], I am working with your health care providers to ensure optimal medication therapy. This service is available to you at no cost.    By participating in this review, I will:     • Review your medications, vitamins and other over-the-counter items.    • Assure there are no harmful interactions with your medications.  • Lower your out-of-pocket prescription costs, if possible.   • Communicate medication concerns between your healthcare specialists.  • Provide you with wellness, healthy lifestyle, and disease prevention strategies.  • Refer you to a nurse or  if needed.  • Consider remote health monitoring if you have high blood pressure.  • Answer any questions that you may have about your medications.    Please contact me at [phone] to discuss your medications. I am available Monday through Friday from 8am to 5pm. I look forward to hearing from you.         Sincerely,        Sangeeta Parnell, BeccaD

## 2017-12-04 NOTE — PROGRESS NOTES
Outbound call to Remi for medication review. Left a message with family member requesting patient to call [phone]. Mailed letter to patient describing our service. Will follow-up when patient makes contact.     Becca GaloD

## 2017-12-14 DIAGNOSIS — M19.90 ARTHRITIS: ICD-10-CM

## 2017-12-14 RX ORDER — MELOXICAM 7.5 MG/1
7.5 TABLET ORAL DAILY
Qty: 30 TAB | Refills: 0 | Status: SHIPPED | OUTPATIENT
Start: 2017-12-14 | End: 2018-01-02

## 2017-12-22 ENCOUNTER — PATIENT OUTREACH (OUTPATIENT)
Dept: HEALTH INFORMATION MANAGEMENT | Facility: OTHER | Age: 72
End: 2017-12-22

## 2017-12-26 ENCOUNTER — TELEPHONE (OUTPATIENT)
Dept: MEDICAL GROUP | Facility: PHYSICIAN GROUP | Age: 72
End: 2017-12-26

## 2017-12-26 NOTE — TELEPHONE ENCOUNTER
Left message for patient to call back regarding pre-visit planning. Please transfer call to Dixie dumas [phone].

## 2017-12-27 ENCOUNTER — PATIENT OUTREACH (OUTPATIENT)
Dept: HEALTH INFORMATION MANAGEMENT | Facility: OTHER | Age: 72
End: 2017-12-27

## 2017-12-27 RX ORDER — IBUPROFEN 200 MG
600 CAPSULE ORAL DAILY
COMMUNITY
End: 2021-02-26

## 2017-12-27 RX ORDER — POLYETHYLENE GLYCOL 3350, SODIUM SULFATE ANHYDROUS, SODIUM BICARBONATE, SODIUM CHLORIDE, POTASSIUM CHLORIDE 236; 22.74; 6.74; 5.86; 2.97 G/4L; G/4L; G/4L; G/4L; G/4L
POWDER, FOR SOLUTION ORAL
Refills: 0 | COMMUNITY
Start: 2017-10-12 | End: 2018-01-02

## 2017-12-27 RX ORDER — AMOXICILLIN 500 MG
1200 CAPSULE ORAL DAILY
COMMUNITY
End: 2021-02-26

## 2017-12-27 RX ORDER — MULTIVIT WITH MINERALS/LUTEIN
1000 TABLET ORAL DAILY
COMMUNITY
End: 2021-02-26

## 2017-12-27 NOTE — PROGRESS NOTES
Received incoming call from Remi for medication review. See completed medication review pharmacy follow-up [employer]. Provided patient with telephone number [phone] to assist with smoking cessation. Patient is triggered by stress and is only using chantix PRN. Advised patient to use chantix regularly and encouraged him to speak with smoking cessation counselor to be successful with quit attempt.     Sangeeta Parnell, PharmD

## 2018-01-02 ENCOUNTER — OFFICE VISIT (OUTPATIENT)
Dept: MEDICAL GROUP | Facility: PHYSICIAN GROUP | Age: 73
End: 2018-01-02
Payer: MEDICARE

## 2018-01-02 VITALS
WEIGHT: 132.28 LBS | TEMPERATURE: 98.6 F | RESPIRATION RATE: 18 BRPM | HEART RATE: 80 BPM | OXYGEN SATURATION: 98 % | DIASTOLIC BLOOD PRESSURE: 64 MMHG | HEIGHT: 68 IN | BODY MASS INDEX: 20.05 KG/M2 | SYSTOLIC BLOOD PRESSURE: 108 MMHG

## 2018-01-02 DIAGNOSIS — M19.90 ARTHRITIS: ICD-10-CM

## 2018-01-02 DIAGNOSIS — R97.20 ELEVATED PSA: ICD-10-CM

## 2018-01-02 DIAGNOSIS — M54.50 ACUTE MIDLINE LOW BACK PAIN WITHOUT SCIATICA: ICD-10-CM

## 2018-01-02 DIAGNOSIS — Z87.891 HISTORY OF NICOTINE DEPENDENCE: ICD-10-CM

## 2018-01-02 DIAGNOSIS — I73.9 PERIPHERAL ARTERY DISEASE (HCC): ICD-10-CM

## 2018-01-02 DIAGNOSIS — J41.1 MUCOPURULENT CHRONIC BRONCHITIS (HCC): ICD-10-CM

## 2018-01-02 DIAGNOSIS — I35.0 AORTIC STENOSIS, MODERATE: ICD-10-CM

## 2018-01-02 PROBLEM — Q89.9 UMBILICAL ABNORMALITY: Status: RESOLVED | Noted: 2017-08-15 | Resolved: 2018-01-02

## 2018-01-02 PROBLEM — R91.8 LUNG MASS: Status: RESOLVED | Noted: 2017-07-14 | Resolved: 2018-01-02

## 2018-01-02 PROBLEM — Z13.6 SCREENING FOR AAA (ABDOMINAL AORTIC ANEURYSM): Status: RESOLVED | Noted: 2017-07-07 | Resolved: 2018-01-02

## 2018-01-02 PROCEDURE — 99214 OFFICE O/P EST MOD 30 MIN: CPT | Performed by: INTERNAL MEDICINE

## 2018-01-02 RX ORDER — MELOXICAM 7.5 MG/1
7.5 TABLET ORAL DAILY
Qty: 30 TAB | Refills: 0 | Status: SHIPPED | OUTPATIENT
Start: 2018-01-02 | End: 2018-01-17

## 2018-01-02 RX ORDER — HYDROCODONE BITARTRATE AND ACETAMINOPHEN 5; 325 MG/1; MG/1
1-2 TABLET ORAL NIGHTLY PRN
Qty: 20 TAB | Refills: 0 | Status: SHIPPED | OUTPATIENT
Start: 2018-01-02 | End: 2018-01-22

## 2018-01-02 RX ORDER — CYCLOBENZAPRINE HCL 5 MG
5 TABLET ORAL NIGHTLY PRN
Qty: 30 TAB | Refills: 0 | Status: SHIPPED | OUTPATIENT
Start: 2018-01-02 | End: 2018-02-01

## 2018-01-02 ASSESSMENT — PAIN SCALES - GENERAL: PAINLEVEL: NO PAIN

## 2018-01-02 ASSESSMENT — PATIENT HEALTH QUESTIONNAIRE - PHQ9: CLINICAL INTERPRETATION OF PHQ2 SCORE: 0

## 2018-01-02 NOTE — PROGRESS NOTES
Chief Complaint   Patient presents with   • Annual Exam         HPI:  Remi Matt is a 72 y.o. here for Medicare Annual Wellness Visit     Patient Active Problem List    Diagnosis Date Noted   • Acute midline low back pain without sciatica 01/02/2018   • Arthritis 07/07/2017   • History of nicotine dependence 11/06/2015   • Aortic stenosis, moderate 09/30/2015   • Peripheral artery disease (CMS-HCC) 09/24/2015   • Elevated PSA 03/27/2015   • COPD (chronic obstructive pulmonary disease) (CMS-HCC)        Current Outpatient Prescriptions   Medication Sig Dispense Refill   • cyclobenzaprine (FLEXERIL) 5 MG tablet Take 1 Tab by mouth at bedtime as needed for up to 30 days. 30 Tab 0   • meloxicam (MOBIC) 7.5 MG Tab Take 1 Tab by mouth every day. 30 Tab 0   • Omega-3 Fatty Acids (FISH OIL) 1200 MG Cap Take 1,200 mg by mouth every day.     • Multiple Vitamins-Minerals (CENTRUM SILVER ADULT 50+ PO) Take 1 tablet by mouth every day.     • Ascorbic Acid (VITAMIN C) 1000 MG Tab Take 1,000 mg by mouth every day.     • Calcium 600 MG Tab Take 600 mg by mouth every day.     • COMBIVENT RESPIMAT  MCG/ACT Aero Soln INHALE 2 PUFFS BY MOUTH EVERY 6 HOURS AS NEEDED 1 Inhaler 3   • albuterol (VENTOLIN HFA) 108 (90 BASE) MCG/ACT Aero Soln inhalation aerosol USE 2 PUFFS BY INHALATIONS EVERY 6 HOURS AS NEEDED FOR SHORTNESS OF BREATH 3 Inhaler 3   • varenicline (CHANTIX STARTING MONTH SANDRA) 0.5 MG X 11 & 1 MG X 42 tablet 0.5 mg by mouth once daily for 3 days, then 0.5 mg by mouth twice daily for 4 days, then 1 mg by mouth once daily. 56 Tab 3   • SPIRIVA HANDIHALER 18 MCG Cap INHALE 1 CAP BY MOUTH EVERY DAY. AT THE SAME TIME EVERY DAY 90 Cap 3   • ipratropium-albuterol (DUONEB) 0.5-2.5 (3) MG/3ML nebulizer solution USE 1 VIAL INTO NEBULIZER 2 TIMES DAILY AS NEEDED FOR SHORTNESS OF BREATH 180 mL 11   • Fluticasone Furoate-Vilanterol 100-25 MCG/INH AEROSOL POWDER, BREATH ACTIVATED Inhale 1 Puff by mouth every day. 3 Each 11   •  aspirin EC 81 MG EC tablet Take 1 Tab by mouth every day. 100 Tab 0     No current facility-administered medications for this visit.             Current supplements as per medication list.       Allergies: Patient has no known allergies.    Current social contact/activities: ***     He  reports that he quit smoking about 5 months ago. His smoking use included Cigarettes. He has a 5.00 pack-year smoking history. He has never used smokeless tobacco. He reports that he drinks alcohol. He reports that he does not use drugs.  Counseling given: Not Answered        DPA/Advanced Directive:  {MA ADVANCED DIRECTIVE:13819}      ROS:    Gait: Uses {DEVICE:55334}   Ostomy: {yes no:452325}   Other tubes: {yes no:529565}   Amputations: {yes no:673317}   Chronic oxygen use: {yes no:676773}   Last eye exam:***   : {Urinary leakage?:75163} incontinence.       Screening:  ***  Depression Screening    Little interest or pleasure in doing things?  0 - not at all  Feeling down, depressed , or hopeless? 0 - not at all  Patient Health Questionnaire Score: 0     If depressive symptoms identified deferred to follow up visit unless specifically addressed in assessment and plan.    Interpretation of PHQ-9 Total Score   Score Severity   1-4 No Depression   5-9 Mild Depression   10-14 Moderate Depression   15-19 Moderately Severe Depression   20-27 Severe Depression    Screening for Cognitive Impairment    Three Minute Recall (apple, watch, cosmo)  0/3    Draw clock face with all 12 numbers set to the hand to show 10 minutes past 11 o'clock  0    Cognitive concerns identified deferred for follow up unless specifically addressed in assessment and plan.    Fall Risk Assessment    Has the patient had two or more falls in the last year or any fall with injury in the last year?  Yes    Safety Assessment    Throw rugs on floor.  Yes  Handrails on all stairs.  Yes  Good lighting in all hallways.  Yes  Difficulty hearing.  No  Patient counseled about  all safety risks that were identified.    Functional Assessment ADLs    Are there any barriers preventing you from cooking for yourself or meeting nutritional needs?  No.    Are there any barriers preventing you from driving safely or obtaining transportation?  No.    Are there any barriers preventing you from using a telephone or calling for help?  No.    Are there any barriers preventing you from shopping?  No.    Are there any barriers preventing you from taking care of your own finances?  No.    Are there any barriers preventing you from managing your medications?  No.    Are currently engaging any exercise or physical activity?  No.       Health Maintenance Summary                Annual Wellness Visit Overdue 1945     IMM DTaP/Tdap/Td Vaccine Overdue 10/16/1963     IMM ZOSTER VACCINE Overdue 10/16/2004     COLON CANCER SCREENING ANNUAL FIT Overdue 3/22/2017      Done 3/22/2016 OCCULT BLOOD FECES IMMUNOASSAY    PFT SCREENING-FEV1 AND FEV/FVC RATIO / SPIROMETRY SHOULD BE PERFORMED ANNUALLY Overdue 6/13/2017      Done 6/13/2016 AMB PULMONARY FUNCTION TEST/LAB    IMM PNEUMOCOCCAL 65+ (ADULT) LOW/MEDIUM RISK SERIES Next Due 6/16/2018      Done 6/16/2017 Imm Admin: Pneumococcal polysaccharide vaccine (PPSV-23)          Patient Care Team:  Christopher Mantilla M.D. as PCP - General (Internal Medicine)  Roselyn Condon M.D. (Family Medicine)      Social History   Substance Use Topics   • Smoking status: Former Smoker     Packs/day: 0.25     Years: 20.00     Types: Cigarettes     Quit date: 7/7/2017   • Smokeless tobacco: Never Used   • Alcohol use 0.0 oz/week      Comment: occ     Family History   Problem Relation Age of Onset   • Cancer Neg Hx    • Diabetes Neg Hx    • Heart Disease Neg Hx    • Stroke Neg Hx    • Heart Attack Neg Hx      He  has a past medical history of COPD (chronic obstructive pulmonary disease) (CMS-[facility]).   Past Surgical History:   Procedure Laterality Date   • ARTHROSCOPY, KNEE  1978     "meniscus, right   • HERNIA REPAIR  child   • TONSILLECTOMY  child       Exam:     Blood pressure 108/64, pulse 80, temperature 37 °C (98.6 °F), resp. rate 18, height 1.725 m (5' 7.9\"), weight 60 kg (132 lb 4.4 oz), SpO2 98 %. Body mass index is 20.17 kg/m².    Hearing {GOOD/FAIR/POOR/EXCELLENT:13328}.    Dentition {DENTITION:41292}  Alert, oriented in no acute distress.  Eye contact is good, speech goal directed, affect calm      Assessment and Plan. The following treatment and monitoring plan is recommended:  ***  1. Mucopurulent chronic bronchitis (CMS-[facility])  Initial Wellness Visit - Includes PPPS ()   2. Aortic stenosis, moderate  Initial Wellness Visit - Includes PPPS ()   3. Arthritis  Initial Wellness Visit - Includes PPPS ()    meloxicam (MOBIC) 7.5 MG Tab   4. Elevated PSA  Initial Wellness Visit - Includes PPPS ()   5. History of nicotine dependence  Initial Wellness Visit - Includes PPPS ()   6. Peripheral artery disease (CMS-[facility])  Initial Wellness Visit - Includes PPPS ()   7. Acute midline low back pain without sciatica  cyclobenzaprine (FLEXERIL) 5 MG tablet    REFERRAL TO PHYSICAL THERAPY Reason for Therapy: Eval/Treat/Report    DX-LUMBAR SPINE-2 OR 3 VIEWS    Initial Wellness Visit - Includes PPPS ()         Services suggested: { AWV COORDINATION OF SERVICES:20405}  Health Care Screening: Age-appropriate preventive services Medicare covers discussed today and ordered if indicated.  Referrals offered: Community-based lifestyle interventions to reduce health risks and promote self-management and wellness, fall prevention, nutrition, physical activity, tobacco-use cessation, weight loss, and mental health services as per orders if indicated.    Discussion today about general wellness and lifestyle habits:    · Prevent falls and reduce trip hazards; Cautioned about securing or removing rugs.  · Have a working fire alarm and carbon monoxide detector;   · Engage in regular " physical activity and social activities       Follow-up: No Follow-up on file.

## 2018-01-03 NOTE — ASSESSMENT & PLAN NOTE
Pt reports back pain that it is going on for last couple of weeks. It is upper lower back, low thoracic. No trauma, or lifting up heavy objects. No fever. He has not had this bad. It got as worse as 10. No radiation. He took ibuprofen and seems to help. He contributes to sleeping in a wrong mattress to his mother house. He had a fall few months ago, but he did not hurt his back this much. He cannot stand siting for long. Walking makes it better

## 2018-01-03 NOTE — PROGRESS NOTES
Subjective:   Remi Matt is a 72 y.o. male here today for back pain, llab work     Bilateral carotid artery stenosis mild  He denies claudication. On statin and asa. Carnitine Doppler ultrasound to thousand 15 showed mild atherosclerotic plaques involving the left bilateral internal carotid arteries. Pedal pulses intact    History of nicotine dependence  He stopped smoking. He will take chantix, if one of his friends smoke in front of him     Elevated PSA  Slight elevated 2016    COPD (chronic obstructive pulmonary disease)  Significant history of smoking. Chest CT scan 08/2017  .  There is no evidence of lung mass or suspicious nodule.  2.  There are changes of underlying centrilobular and paraseptal emphysema with hyperinflation suggesting COPD.  3.  There are several tiny subpleural areas of nodular scarring in both lungs.  4.  There is evidence of previous granulomatous disease.    Arthritis  It is better. He had not used antiinflammatory recently. Mostly is the back that bothers him     Aortic stenosis, moderate  Moderate, asymptomatic. Echo 2015. Consider to repeat the test at next apt    Acute midline low back pain without sciatica  Pt reports back pain that it is going on for last couple of weeks. It is upper lower back, low thoracic. No trauma, or lifting up heavy objects. No fever. He has not had this bad. It got as worse as 10. No radiation. He took ibuprofen and seems to help. He contributes to sleeping in a wrong mattress to his mother house. He had a fall few months ago, but he did not hurt his back this much. He cannot stand siting for long. Walking makes it better        Current medicines (including changes today)  Current Outpatient Prescriptions   Medication Sig Dispense Refill   • cyclobenzaprine (FLEXERIL) 5 MG tablet Take 1 Tab by mouth at bedtime as needed for up to 30 days. 30 Tab 0   • meloxicam (MOBIC) 7.5 MG Tab Take 1 Tab by mouth every day. 30 Tab 0   • hydrocodone-acetaminophen  (NORCO) 5-325 MG Tab per tablet Take 1-2 Tabs by mouth at bedtime as needed for up to 20 days. 20 Tab 0   • Omega-3 Fatty Acids (FISH OIL) 1200 MG Cap Take 1,200 mg by mouth every day.     • Multiple Vitamins-Minerals (CENTRUM SILVER ADULT 50+ PO) Take 1 tablet by mouth every day.     • Ascorbic Acid (VITAMIN C) 1000 MG Tab Take 1,000 mg by mouth every day.     • Calcium 600 MG Tab Take 600 mg by mouth every day.     • COMBIVENT RESPIMAT  MCG/ACT Aero Soln INHALE 2 PUFFS BY MOUTH EVERY 6 HOURS AS NEEDED 1 Inhaler 3   • albuterol (VENTOLIN HFA) 108 (90 BASE) MCG/ACT Aero Soln inhalation aerosol USE 2 PUFFS BY INHALATIONS EVERY 6 HOURS AS NEEDED FOR SHORTNESS OF BREATH 3 Inhaler 3   • varenicline (CHANTIX STARTING MONTH PAK) 0.5 MG X 11 & 1 MG X 42 tablet 0.5 mg by mouth once daily for 3 days, then 0.5 mg by mouth twice daily for 4 days, then 1 mg by mouth once daily. 56 Tab 3   • SPIRIVA HANDIHALER 18 MCG Cap INHALE 1 CAP BY MOUTH EVERY DAY. AT THE SAME TIME EVERY DAY 90 Cap 3   • ipratropium-albuterol (DUONEB) 0.5-2.5 (3) MG/3ML nebulizer solution USE 1 VIAL INTO NEBULIZER 2 TIMES DAILY AS NEEDED FOR SHORTNESS OF BREATH 180 mL 11   • Fluticasone Furoate-Vilanterol 100-25 MCG/INH AEROSOL POWDER, BREATH ACTIVATED Inhale 1 Puff by mouth every day. 3 Each 11   • aspirin EC 81 MG EC tablet Take 1 Tab by mouth every day. 100 Tab 0     No current facility-administered medications for this visit.      He  has a past medical history of COPD (chronic obstructive pulmonary disease) (CMS-[facility]).    Current Outpatient Prescriptions   Medication Sig Dispense Refill   • cyclobenzaprine (FLEXERIL) 5 MG tablet Take 1 Tab by mouth at bedtime as needed for up to 30 days. 30 Tab 0   • meloxicam (MOBIC) 7.5 MG Tab Take 1 Tab by mouth every day. 30 Tab 0   • hydrocodone-acetaminophen (NORCO) 5-325 MG Tab per tablet Take 1-2 Tabs by mouth at bedtime as needed for up to 20 days. 20 Tab 0   • Omega-3 Fatty Acids (FISH OIL) 1200 MG Cap  Take 1,200 mg by mouth every day.     • Multiple Vitamins-Minerals (CENTRUM SILVER ADULT 50+ PO) Take 1 tablet by mouth every day.     • Ascorbic Acid (VITAMIN C) 1000 MG Tab Take 1,000 mg by mouth every day.     • Calcium 600 MG Tab Take 600 mg by mouth every day.     • COMBIVENT RESPIMAT  MCG/ACT Aero Soln INHALE 2 PUFFS BY MOUTH EVERY 6 HOURS AS NEEDED 1 Inhaler 3   • albuterol (VENTOLIN HFA) 108 (90 BASE) MCG/ACT Aero Soln inhalation aerosol USE 2 PUFFS BY INHALATIONS EVERY 6 HOURS AS NEEDED FOR SHORTNESS OF BREATH 3 Inhaler 3   • varenicline (CHANTIX STARTING MONTH SANDRA) 0.5 MG X 11 & 1 MG X 42 tablet 0.5 mg by mouth once daily for 3 days, then 0.5 mg by mouth twice daily for 4 days, then 1 mg by mouth once daily. 56 Tab 3   • SPIRIVA HANDIHALER 18 MCG Cap INHALE 1 CAP BY MOUTH EVERY DAY. AT THE SAME TIME EVERY DAY 90 Cap 3   • ipratropium-albuterol (DUONEB) 0.5-2.5 (3) MG/3ML nebulizer solution USE 1 VIAL INTO NEBULIZER 2 TIMES DAILY AS NEEDED FOR SHORTNESS OF BREATH 180 mL 11   • Fluticasone Furoate-Vilanterol 100-25 MCG/INH AEROSOL POWDER, BREATH ACTIVATED Inhale 1 Puff by mouth every day. 3 Each 11   • aspirin EC 81 MG EC tablet Take 1 Tab by mouth every day. 100 Tab 0     No current facility-administered medications for this visit.        Allergies as of 01/02/2018   • (No Known Allergies)       Social History     Social History   • Marital status:      Spouse name: N/A   • Number of children: N/A   • Years of education: N/A     Occupational History   • Not on file.     Social History Main Topics   • Smoking status: Former Smoker     Packs/day: 0.25     Years: 20.00     Types: Cigarettes     Quit date: 7/7/2017   • Smokeless tobacco: Never Used   • Alcohol use 0.0 oz/week      Comment: occ   • Drug use: No   • Sexual activity: Yes     Partners: Female      Comment: single retired      Other Topics Concern   • Not on file     Social History Narrative   • No narrative on file  "       Family History   Problem Relation Age of Onset   • Cancer Neg Hx    • Diabetes Neg Hx    • Heart Disease Neg Hx    • Stroke Neg Hx    • Heart Attack Neg Hx        Past Surgical History:   Procedure Laterality Date   • ARTHROSCOPY, KNEE  1978    meniscus, right   • HERNIA REPAIR  child   • TONSILLECTOMY  child       ROS   No chest pain, no shortness of breath, no abdominal pain       Objective:     Blood pressure 108/64, pulse 80, temperature 37 °C (98.6 °F), resp. rate 18, height 1.725 m (5' 7.9\"), weight 60 kg (132 lb 4.4 oz), SpO2 98 %. Body mass index is 20.17 kg/m².   Physical Exam:  Constitutional: Alert, + distress form back pain   Skin: Warm, dry, good turgor, no rashes in visible areas.  Eye: Equal, round and reactive, conjunctiva clear, lids normal.  ENMT: Lips without lesions, good dentition, oropharynx clear.  Neck: Trachea midline, no masses, no thyromegaly. No cervical or supraclavicular lymphadenopathy  Respiratory: Unlabored respiratory effort, lungs clear to auscultation, no wheezes, no ronchi.  Cardiovascular: Normal S1, S2, no murmur, no edema.  Abdomen: Soft, non-tender, no masses, no hepatosplenomegaly.  Psych: Alert and oriented x3, normal affect and mood.  SPINE: No significant spinal curvature on forward bend. Mild tenderness in paraspinous muscles lumbar spine without current spasm. SLT negative. DTR 2+ patella,  bilaterally. Strength 5/5 proximal and distal LE.  No pain with stress of SI. Full hip ROM. Poor hamstring flexibility. No symptoms with axial loading.         Assessment and Plan:   The following treatment plan was discussed    1. Mucopurulent chronic bronchitis (CMS-HCC)  Continue same. Follow up with [facility] for repeat PFT studies  - Initial Wellness Visit - Includes PPPS ()    2. Aortic stenosis, moderate  Repeat US at next apt   - Initial Wellness Visit - Includes PPPS ()    3. Arthritis  Ibuprofen prn, meloxicam   - Initial Wellness Visit - Includes PPPS ()  - " meloxicam (MOBIC) 7.5 MG Tab; Take 1 Tab by mouth every day.  Dispense: 30 Tab; Refill: 0    4. Elevated PSA  Continue to monitor   - Initial Wellness Visit - Includes PPPS ()    5. History of nicotine dependence  Counseling provided to stay absitence   - Initial Wellness Visit - Includes PPPS ()    6. Peripheral artery disease (CMS-HCC)  Continue asa. No cnmplications   - Initial Wellness Visit - Includes PPPS ()    7. Acute midline low back pain without sciatica  I advise walking, flexeril at night, norco at night as tolerated. Physical therapy. He is a smoker. I will order an XR for further eval   I advise to follow up if not better   - cyclobenzaprine (FLEXERIL) 5 MG tablet; Take 1 Tab by mouth at bedtime as needed for up to 30 days.  Dispense: 30 Tab; Refill: 0  - REFERRAL TO PHYSICAL THERAPY Reason for Therapy: Eval/Treat/Report  - DX-LUMBAR SPINE-2 OR 3 VIEWS; Future  - Initial Wellness Visit - Includes PPPS ()  - hydrocodone-acetaminophen (NORCO) 5-325 MG Tab per tablet; Take 1-2 Tabs by mouth at bedtime as needed for up to 20 days.  Dispense: 20 Tab; Refill: 0  - CONSENT FOR OPIATE PRESCRIPTION      Followup: Return in about 2 weeks (around 1/16/2018), or if symptoms worsen or fail to improve, for Short.

## 2018-01-03 NOTE — ASSESSMENT & PLAN NOTE
He denies claudication. On statin and asa. Carnitine Doppler ultrasound to thousand 15 showed mild atherosclerotic plaques involving the left bilateral internal carotid arteries. Pedal pulses intact

## 2018-01-03 NOTE — ASSESSMENT & PLAN NOTE
Significant history of smoking. Chest CT scan 08/2017  .  There is no evidence of lung mass or suspicious nodule.  2.  There are changes of underlying centrilobular and paraseptal emphysema with hyperinflation suggesting COPD.  3.  There are several tiny subpleural areas of nodular scarring in both lungs.  4.  There is evidence of previous granulomatous disease.

## 2018-01-06 DIAGNOSIS — F17.200 TOBACCO USE DISORDER: ICD-10-CM

## 2018-01-08 RX ORDER — VARENICLINE TARTRATE 0.5 MG/1
TABLET, FILM COATED ORAL
Qty: 56 TAB | Refills: 0 | Status: SHIPPED | OUTPATIENT
Start: 2018-01-08 | End: 2018-03-25 | Stop reason: SDUPTHER

## 2018-01-09 ENCOUNTER — APPOINTMENT (OUTPATIENT)
Dept: RADIOLOGY | Facility: MEDICAL CENTER | Age: 73
End: 2018-01-09
Attending: EMERGENCY MEDICINE
Payer: MEDICARE

## 2018-01-09 ENCOUNTER — APPOINTMENT (OUTPATIENT)
Dept: PHYSICAL THERAPY | Facility: REHABILITATION | Age: 73
End: 2018-01-09
Attending: INTERNAL MEDICINE
Payer: MEDICARE

## 2018-01-09 ENCOUNTER — HOSPITAL ENCOUNTER (EMERGENCY)
Facility: MEDICAL CENTER | Age: 73
End: 2018-01-09
Attending: EMERGENCY MEDICINE
Payer: MEDICARE

## 2018-01-09 VITALS
TEMPERATURE: 98.2 F | SYSTOLIC BLOOD PRESSURE: 121 MMHG | HEIGHT: 68 IN | WEIGHT: 130 LBS | DIASTOLIC BLOOD PRESSURE: 66 MMHG | BODY MASS INDEX: 19.7 KG/M2 | RESPIRATION RATE: 15 BRPM | HEART RATE: 75 BPM | OXYGEN SATURATION: 95 %

## 2018-01-09 DIAGNOSIS — Z53.29 LEFT AGAINST MEDICAL ADVICE: ICD-10-CM

## 2018-01-09 DIAGNOSIS — R07.9 CHEST PAIN, UNSPECIFIED TYPE: ICD-10-CM

## 2018-01-09 DIAGNOSIS — R55 SYNCOPE, UNSPECIFIED SYNCOPE TYPE: ICD-10-CM

## 2018-01-09 LAB
ALBUMIN SERPL BCP-MCNC: 3.7 G/DL (ref 3.2–4.9)
ALBUMIN/GLOB SERPL: 1.2 G/DL
ALP SERPL-CCNC: 54 U/L (ref 30–99)
ALT SERPL-CCNC: 10 U/L (ref 2–50)
ANION GAP SERPL CALC-SCNC: 8 MMOL/L (ref 0–11.9)
APTT PPP: 33.2 SEC (ref 24.7–36)
AST SERPL-CCNC: 18 U/L (ref 12–45)
BASOPHILS # BLD AUTO: 0.4 % (ref 0–1.8)
BASOPHILS # BLD: 0.04 K/UL (ref 0–0.12)
BILIRUB SERPL-MCNC: 0.4 MG/DL (ref 0.1–1.5)
BNP SERPL-MCNC: 56 PG/ML (ref 0–100)
BUN BLD-MCNC: 17 MG/DL (ref 8–22)
BUN SERPL-MCNC: 14 MG/DL (ref 8–22)
CALCIUM SERPL-MCNC: 9.1 MG/DL (ref 8.4–10.2)
CHLORIDE BLD-SCNC: 108 MMOL/L (ref 96–112)
CHLORIDE SERPL-SCNC: 110 MMOL/L (ref 96–112)
CO2 BLD-SCNC: 23 MMOL/L (ref 20–33)
CO2 SERPL-SCNC: 20 MMOL/L (ref 20–33)
CREAT BLD-MCNC: 0.8 MG/DL (ref 0.5–1.4)
CREAT SERPL-MCNC: 0.74 MG/DL (ref 0.5–1.4)
EKG IMPRESSION: NORMAL
EOSINOPHIL # BLD AUTO: 0.21 K/UL (ref 0–0.51)
EOSINOPHIL NFR BLD: 1.9 % (ref 0–6.9)
ERYTHROCYTE [DISTWIDTH] IN BLOOD BY AUTOMATED COUNT: 51.1 FL (ref 35.9–50)
GLOBULIN SER CALC-MCNC: 3 G/DL (ref 1.9–3.5)
GLUCOSE BLD-MCNC: 98 MG/DL (ref 65–99)
GLUCOSE SERPL-MCNC: 90 MG/DL (ref 65–99)
HCT VFR BLD AUTO: 30.5 % (ref 42–52)
HGB BLD-MCNC: 9.8 G/DL (ref 14–18)
IMM GRANULOCYTES # BLD AUTO: 0.04 K/UL (ref 0–0.11)
IMM GRANULOCYTES NFR BLD AUTO: 0.4 % (ref 0–0.9)
INR PPP: 1.06 (ref 0.87–1.13)
LIPASE SERPL-CCNC: 25 U/L (ref 7–58)
LYMPHOCYTES # BLD AUTO: 1.24 K/UL (ref 1–4.8)
LYMPHOCYTES NFR BLD: 11.5 % (ref 22–41)
MCH RBC QN AUTO: 29.9 PG (ref 27–33)
MCHC RBC AUTO-ENTMCNC: 32.1 G/DL (ref 33.7–35.3)
MCV RBC AUTO: 93 FL (ref 81.4–97.8)
MONOCYTES # BLD AUTO: 0.61 K/UL (ref 0–0.85)
MONOCYTES NFR BLD AUTO: 5.6 % (ref 0–13.4)
NEUTROPHILS # BLD AUTO: 8.67 K/UL (ref 1.82–7.42)
NEUTROPHILS NFR BLD: 80.2 % (ref 44–72)
NRBC # BLD AUTO: 0 K/UL
NRBC BLD-RTO: 0 /100 WBC
PLATELET # BLD AUTO: 411 K/UL (ref 164–446)
PMV BLD AUTO: 8.1 FL (ref 9–12.9)
POTASSIUM BLD-SCNC: 3.6 MMOL/L (ref 3.6–5.5)
POTASSIUM SERPL-SCNC: 3.6 MMOL/L (ref 3.6–5.5)
PROT SERPL-MCNC: 6.7 G/DL (ref 6–8.2)
PROTHROMBIN TIME: 13.5 SEC (ref 12–14.6)
RBC # BLD AUTO: 3.28 M/UL (ref 4.7–6.1)
SODIUM BLD-SCNC: 142 MMOL/L (ref 135–145)
SODIUM SERPL-SCNC: 138 MMOL/L (ref 135–145)
TROPONIN I SERPL-MCNC: <0.01 NG/ML (ref 0–0.04)
WBC # BLD AUTO: 10.8 K/UL (ref 4.8–10.8)

## 2018-01-09 PROCEDURE — 82374 ASSAY BLOOD CARBON DIOXIDE: CPT

## 2018-01-09 PROCEDURE — 84295 ASSAY OF SERUM SODIUM: CPT

## 2018-01-09 PROCEDURE — 82565 ASSAY OF CREATININE: CPT

## 2018-01-09 PROCEDURE — 83690 ASSAY OF LIPASE: CPT

## 2018-01-09 PROCEDURE — 80053 COMPREHEN METABOLIC PANEL: CPT

## 2018-01-09 PROCEDURE — 85025 COMPLETE CBC W/AUTO DIFF WBC: CPT

## 2018-01-09 PROCEDURE — 85610 PROTHROMBIN TIME: CPT

## 2018-01-09 PROCEDURE — 99284 EMERGENCY DEPT VISIT MOD MDM: CPT

## 2018-01-09 PROCEDURE — 93005 ELECTROCARDIOGRAM TRACING: CPT | Performed by: EMERGENCY MEDICINE

## 2018-01-09 PROCEDURE — 85730 THROMBOPLASTIN TIME PARTIAL: CPT

## 2018-01-09 PROCEDURE — 71045 X-RAY EXAM CHEST 1 VIEW: CPT

## 2018-01-09 PROCEDURE — 84484 ASSAY OF TROPONIN QUANT: CPT

## 2018-01-09 PROCEDURE — 82947 ASSAY GLUCOSE BLOOD QUANT: CPT

## 2018-01-09 PROCEDURE — 84520 ASSAY OF UREA NITROGEN: CPT

## 2018-01-09 PROCEDURE — 83880 ASSAY OF NATRIURETIC PEPTIDE: CPT

## 2018-01-09 PROCEDURE — 84132 ASSAY OF SERUM POTASSIUM: CPT

## 2018-01-09 PROCEDURE — 82435 ASSAY OF BLOOD CHLORIDE: CPT

## 2018-01-09 ASSESSMENT — ENCOUNTER SYMPTOMS
FALLS: 1
HEADACHES: 0
DIARRHEA: 0
VOMITING: 0
ABDOMINAL PAIN: 1
SHORTNESS OF BREATH: 1
NAUSEA: 0
LOSS OF CONSCIOUSNESS: 0

## 2018-01-09 ASSESSMENT — PAIN SCALES - GENERAL: PAINLEVEL_OUTOF10: 4

## 2018-01-09 NOTE — ED NOTES
PT BIBA to room  38 with multiple complaints, all chronic. Patient has had abdominal pain for over a month, he was seen in [place] for it, all testing was negative. Patient was seen by his PCP and nothing was found. Patient is supposed to see a doctor today but cannot remember who or why. PT was setting up his nebulizer treatment when he fell, denies losing consciousness, pt states he did have some left sided chest pain which  has resolved, pt states cici told him he should come in

## 2018-01-09 NOTE — DISCHARGE INSTRUCTIONS
Cardiac Event Monitoring  A cardiac event monitor is a small recording device used to help detect abnormal heart rhythms (arrhythmias). The monitor is used to record heart rhythm when noticeable symptoms such as the following occur:  · Fast heartbeats (palpitations), such as heart racing or fluttering.  · Dizziness.  · Fainting or light-headedness.  · Unexplained weakness.  The monitor is wired to two electrodes placed on your chest. Electrodes are flat, sticky disks that attach to your skin. The monitor can be worn for up to 30 days. You will wear the monitor at all times, except when bathing.   HOW TO USE YOUR CARDIAC EVENT MONITOR  A technician will prepare your chest for the electrode placement. The technician will show you how to place the electrodes, how to work the monitor, and how to replace the batteries. Take time to practice using the monitor before you leave the office. Make sure you understand how to send the information from the monitor to your health care provider. This requires a telephone with a landline, not a cell phone. You need to:  · Wear your monitor at all times, except when you are in water:  ¨ Do not get the monitor wet.  ¨ Take the monitor off when bathing. Do not swim or use a hot tub with it on.  · Keep your skin clean. Do not put body lotion or moisturizer on your chest.  · Change the electrodes daily or any time they stop sticking to your skin. You might need to use tape to keep them on.  · It is possible that your skin under the electrodes could become irritated. To keep this from happening, try to put the electrodes in slightly different places on your chest. However, they must remain in the area under your left breast and in the upper right section of your chest.  · Make sure the monitor is safely clipped to your clothing or in a location close to your body that your health care provider recommends.  · Press the button to record when you feel symptoms of heart trouble, such as  dizziness, weakness, light-headedness, palpitations, thumping, shortness of breath, unexplained weakness, or a fluttering or racing heart. The monitor is always on and records what happened slightly before you pressed the button, so do not worry about being too late to get good information.  · Keep a diary of your activities, such as walking, doing chores, and taking medicine. It is especially important to note what you were doing when you pushed the button to record your symptoms. This will help your health care provider determine what might be contributing to your symptoms. The information stored in your monitor will be reviewed by your health care provider alongside your diary entries.  · Send the recorded information as recommended by your health care provider. It is important to understand that it will take some time for your health care provider to process the results.  · Change the batteries as recommended by your health care provider.  SEEK IMMEDIATE MEDICAL CARE IF:   · You have chest pain.  · You have extreme difficulty breathing or shortness of breath.  · You develop a very fast heartbeat that persists.  · You develop dizziness that does not go away.  · You faint or constantly feel you are about to faint.     This information is not intended to replace advice given to you by your health care provider. Make sure you discuss any questions you have with your health care provider.     Document Released: 09/26/2009 Document Revised: 01/08/2016 Document Reviewed: 06/16/2014  [employer] Interactive Patient Education ©2016 [employer]    Chest Pain Observation  It is often hard to give a specific diagnosis for the cause of chest pain. Among other possibilities your symptoms might be caused by inadequate oxygen delivery to your heart (angina). Angina that is not treated or evaluated can lead to a heart attack (myocardial infarction) or death.  Blood tests, electrocardiograms, and X-rays may have been done to help  determine a possible cause of your chest pain. After evaluation and observation, your health care provider has determined that it is unlikely your pain was caused by an unstable condition that requires hospitalization. However, a full evaluation of your pain may need to be completed, with additional diagnostic testing as directed. It is very important to keep your follow-up appointments. Not keeping your follow-up appointments could result in permanent heart damage, disability, or death. If there is any problem keeping your follow-up appointments, you must call your health care provider.  HOME CARE INSTRUCTIONS   Due to the slight chance that your pain could be angina, it is important to follow your health care provider's treatment plan and also maintain a healthy lifestyle:  · Maintain or work toward achieving a healthy weight.  · Stay physically active and exercise regularly.  · Decrease your salt intake.  · Eat a balanced, healthy diet. Talk to a dietitian to learn about heart-healthy foods.  · Increase your fiber intake by including whole grains, vegetables, fruits, and nuts in your diet.  · Avoid situations that cause stress, anger, or depression.  · Take medicines as advised by your health care provider. Report any side effects to your health care provider. Do not stop medicines or adjust the dosages on your own.  · Quit smoking. Do not use nicotine patches or gum until you check with your health care provider.  · Keep your blood pressure, blood sugar, and cholesterol levels within normal limits.  · Limit alcohol intake to no more than 1 drink per day for women who are not pregnant and 2 drinks per day for men.  · Do not abuse drugs.  SEEK IMMEDIATE MEDICAL CARE IF:  You have severe chest pain or pressure which may include symptoms such as:  · You feel pain or pressure in your arms, neck, jaw, or back.  · You have severe back or abdominal pain, feel sick to your stomach (nauseous), or throw up (vomit).  · You  are sweating profusely.  · You are having a fast or irregular heartbeat.  · You feel short of breath while at rest.  · You notice increasing shortness of breath during rest, sleep, or with activity.  · You have chest pain that does not get better after rest or after taking your usual medicine.  · You wake from sleep with chest pain.  · You are unable to sleep because you cannot breathe.  · You develop a frequent cough or you are coughing up blood.  · You feel dizzy, faint, or experience extreme fatigue.  · You develop severe weakness, dizziness, fainting, or chills.  Any of these symptoms may represent a serious problem that is an emergency. Do not wait to see if the symptoms will go away. Call your local emergency services (911 in the [place] Do not drive yourself to the hospital.  MAKE SURE YOU:  · Understand these instructions.  · Will watch your condition.  · Will get help right away if you are not doing well or get worse.     This information is not intended to replace advice given to you by your health care provider. Make sure you discuss any questions you have with your health care provider.     Document Released: 01/20/2012 Document Revised: 12/23/2014 Document Reviewed: 06/19/2014  [employer] Interactive Patient Education ©2016 [employer]      Discharge Against Medical Advice  I am signing this paper to show that I am leaving this hospital or health care center of my own free will. It is done against all medical advice. In doing so, I am releasing this hospital or health care center and the attending physicians from any and all claims that I may want to make.  I understand that further care has been recommended. My condition may worsen. This could cause me further bodily injury, illness, or even death. I do know that the medical staff has fully explained to me the risk that I am taking in leaving against medical advice.  Document Released: 12/18/2006 Document Revised: 03/11/2013 Document Reviewed:  06/03/2008  [employer]® Patient Information ©2014 [employer]

## 2018-01-09 NOTE — ED PROVIDER NOTES
ED Provider Note    Scribed for Mohit Leblanc M.D. by Darwin Ponce. 1/9/2018, 11:07 AM.    Primary care provider: Christopher Mantilla M.D.  Means of arrival: [facility]  History obtained from: Patient  History limited by: None    CHIEF COMPLAINT  Chief Complaint   Patient presents with   • Other     pt has multiple complaints,       HPI  Remi Matt is a 72 y.o. male who presents to the Emergency Department via gurney complaining of a ground level fall and syncopal event onset just prior to arrival. He is experiencing associated chronic abdominal pain, chest pain, and shortness of breath. His chest pain and shortness of breath have since resolved. The patient received oxygen and aspirin in the ambulance en route to the hospital. Patient states he was alone when he began to feel short of breath and fell. Denies a loss of consciousness.   Regarding his abdominal pain, he has an appointment on the 25th with a GI doctor. The patient states he has experienced similar abdominal symptoms before when he was in the ED approximately 3 years ago in 2015 for similar symptoms. He reports he saw a doctor in [place] approximately a month ago for his abdominal pain, and he reports he underwent multiple tests and had multiple scan however the doctor found nothing wrong. No complaints of diarrhea, headache, nausea, or vomiting at this time.     REVIEW OF SYSTEMS  Review of Systems   Respiratory: Positive for shortness of breath.    Cardiovascular: Positive for chest pain.   Gastrointestinal: Positive for abdominal pain. Negative for diarrhea, nausea and vomiting.   Musculoskeletal: Positive for falls.   Neurological: Negative for loss of consciousness and headaches.        Syncope   All other systems reviewed and are negative.    C.     PAST MEDICAL HISTORY   has a past medical history of COPD (chronic obstructive pulmonary disease) (CMS-[facility]).    SURGICAL HISTORY   has a past surgical history that includes arthroscopy, knee (1978); hernia  repair (child); and tonsillectomy (child).    SOCIAL HISTORY  Social History   Substance Use Topics   • Smoking status: Former Smoker     Packs/day: 0.25     Years: 20.00     Types: Cigarettes     Quit date: 7/7/2017   • Smokeless tobacco: Never Used   • Alcohol use 0.0 oz/week      Comment: occ      History   Drug Use No       FAMILY HISTORY  Family History   Problem Relation Age of Onset   • Cancer Neg Hx    • Diabetes Neg Hx    • Heart Disease Neg Hx    • Stroke Neg Hx    • Heart Attack Neg Hx        CURRENT MEDICATIONS  No current facility-administered medications on file prior to encounter.      Current Outpatient Prescriptions on File Prior to Encounter   Medication Sig Dispense Refill   • CHANTIX 0.5 MG tablet TAKE 1 TABLET BY MOUTH ONCE DAILY FOR 3 DAYS THEN 1 TAB TWICE DAILY FOR 4 DAYS THEN 2 TABS ONCE KHALIDA 56 Tab 0   • cyclobenzaprine (FLEXERIL) 5 MG tablet Take 1 Tab by mouth at bedtime as needed for up to 30 days. 30 Tab 0   • meloxicam (MOBIC) 7.5 MG Tab Take 1 Tab by mouth every day. 30 Tab 0   • hydrocodone-acetaminophen (NORCO) 5-325 MG Tab per tablet Take 1-2 Tabs by mouth at bedtime as needed for up to 20 days. 20 Tab 0   • Omega-3 Fatty Acids (FISH OIL) 1200 MG Cap Take 1,200 mg by mouth every day.     • Multiple Vitamins-Minerals (CENTRUM SILVER ADULT 50+ PO) Take 1 tablet by mouth every day.     • Ascorbic Acid (VITAMIN C) 1000 MG Tab Take 1,000 mg by mouth every day.     • Calcium 600 MG Tab Take 600 mg by mouth every day.     • COMBIVENT RESPIMAT  MCG/ACT Aero Soln INHALE 2 PUFFS BY MOUTH EVERY 6 HOURS AS NEEDED 1 Inhaler 3   • albuterol (VENTOLIN HFA) 108 (90 BASE) MCG/ACT Aero Soln inhalation aerosol USE 2 PUFFS BY INHALATIONS EVERY 6 HOURS AS NEEDED FOR SHORTNESS OF BREATH 3 Inhaler 3   • SPIRIVA HANDIHALER 18 MCG Cap INHALE 1 CAP BY MOUTH EVERY DAY. AT THE SAME TIME EVERY DAY 90 Cap 3   • ipratropium-albuterol (DUONEB) 0.5-2.5 (3) MG/3ML nebulizer solution USE 1 VIAL INTO NEBULIZER 2  "TIMES DAILY AS NEEDED FOR SHORTNESS OF BREATH 180 mL 11   • Fluticasone Furoate-Vilanterol 100-25 MCG/INH AEROSOL POWDER, BREATH ACTIVATED Inhale 1 Puff by mouth every day. 3 Each 11   • aspirin EC 81 MG EC tablet Take 1 Tab by mouth every day. 100 Tab 0      ALLERGIES  No Known Allergies    PHYSICAL EXAM  VITAL SIGNS: /65   Pulse 75   Temp 36.4 °C (97.5 °F)   Resp 15   Ht 1.727 m (5' 8\")   Wt 59 kg (130 lb)   SpO2 97%   BMI 19.77 kg/m²     Constitutional: Well developed, Well nourished, No acute distress, Non-toxic appearance.   HENT: Dry mucous membranes, Normocephalic, Atraumatic, Bilateral external ears normal, oropharynx moist, No oral exudates, Nose normal.   Eyes: Pupils are equal round and react to light, extraocular motions are intact, conjunctiva is normal, there are no signs of exudate.   Neck: Supple, no meningeal signs.  Lymphatic: No lymphadenopathy noted.   Cardiovascular: Regular rate and rhythm without murmurs gallops or rubs.   Thorax & Lungs: Diminished breath sounds throughout, however clear otherwise, Breathing comfortably. Lungs are clear to auscultation bilaterally, there are no wheezes no rales. Chest wall is nontender.  Abdomen: Diffusely tender but chronic in nature, no bowel sounds present, Soft, nondistended.   Skin: Warm, Dry, No erythema,   Back: No tenderness, No CVA tenderness.  Musculoskeletal: Good range of motion in all major joints. No tenderness to palpation or major deformities noted. Intact distal pulses, no clubbing, no cyanosis, no edema,   Neurologic: Alert & oriented x 3, Moving all extremities. No gross abnormalities.    Psychiatric: Affect normal, Judgment normal, Mood normal.       LABS  Results for orders placed or performed during the hospital encounter of 01/09/18   CBC with Differential   Result Value Ref Range    WBC 10.8 4.8 - 10.8 K/uL    RBC 3.28 (L) 4.70 - 6.10 M/uL    Hemoglobin 9.8 (L) 14.0 - 18.0 g/dL    Hematocrit 30.5 (L) 42.0 - 52.0 %    MCV " 93.0 81.4 - 97.8 fL    MCH 29.9 27.0 - 33.0 pg    MCHC 32.1 (L) 33.7 - 35.3 g/dL    RDW 51.1 (H) 35.9 - 50.0 fL    Platelet Count 411 164 - 446 K/uL    MPV 8.1 (L) 9.0 - 12.9 fL    Neutrophils-Polys 80.20 (H) 44.00 - 72.00 %    Lymphocytes 11.50 (L) 22.00 - 41.00 %    Monocytes 5.60 0.00 - 13.40 %    Eosinophils 1.90 0.00 - 6.90 %    Basophils 0.40 0.00 - 1.80 %    Immature Granulocytes 0.40 0.00 - 0.90 %    Nucleated RBC 0.00 /100 WBC    Neutrophils (Absolute) 8.67 (H) 1.82 - 7.42 K/uL    Lymphs (Absolute) 1.24 1.00 - 4.80 K/uL    Monos (Absolute) 0.61 0.00 - 0.85 K/uL    Eos (Absolute) 0.21 0.00 - 0.51 K/uL    Baso (Absolute) 0.04 0.00 - 0.12 K/uL    Immature Granulocytes (abs) 0.04 0.00 - 0.11 K/uL    NRBC (Absolute) 0.00 K/uL   Complete Metabolic Panel (CMP)   Result Value Ref Range    Sodium 138 135 - 145 mmol/L    Potassium 3.6 3.6 - 5.5 mmol/L    Chloride 110 96 - 112 mmol/L    Co2 20 20 - 33 mmol/L    Anion Gap 8.0 0.0 - 11.9    Glucose 90 65 - 99 mg/dL    Bun 14 8 - 22 mg/dL    Creatinine 0.74 0.50 - 1.40 mg/dL    Calcium 9.1 8.4 - 10.2 mg/dL    AST(SGOT) 18 12 - 45 U/L    ALT(SGPT) 10 2 - 50 U/L    Alkaline Phosphatase 54 30 - 99 U/L    Total Bilirubin 0.4 0.1 - 1.5 mg/dL    Albumin 3.7 3.2 - 4.9 g/dL    Total Protein 6.7 6.0 - 8.2 g/dL    Globulin 3.0 1.9 - 3.5 g/dL    A-G Ratio 1.2 g/dL   Btype Natriuretic Peptide (BNP)   Result Value Ref Range    B Natriuretic Peptide 56 0 - 100 pg/mL   Prothrombin Time (PT/INR)   Result Value Ref Range    PT 13.5 12.0 - 14.6 sec    INR 1.06 0.87 - 1.13   APTT   Result Value Ref Range    APTT 33.2 24.7 - 36.0 sec   Lipase   Result Value Ref Range    Lipase 25 7 - 58 U/L   Troponin STAT   Result Value Ref Range    Troponin I <0.01 0.00 - 0.04 ng/mL   ESTIMATED GFR   Result Value Ref Range    GFR If African American >60 >60 mL/min/1.73 m 2    GFR If Non African American >60 >60 mL/min/1.73 m 2   EKG (ER)   Result Value Ref Range    Report       [facility]  [place] [facility].    Test Date:  2018  Pt Name:    MARY MATTHEWS            Department: ER  MRN:        [id number]                      Room:        38  Gender:     Male                         Technician: [id number]  :        [id number]                   Requested By:ERNESTO HENDERSON  Order #:    [id number]                    Reading MD: ERNESTO HENDERSON MD    Measurements  Intervals                                Axis  Rate:       75                           P:          55  FL:         180                          QRS:        62  QRSD:       108                          T:          -32  QT:         400  QTc:        447    Interpretive Statements  SINUS RHYTHM  INCOMPLETE RIGHT BUNDLE BRANCH BLOCK  ABNORMAL T, CONSIDER ISCHEMIA, INFERIOR LEADS  MINIMAL ST ELEVATION, ANTERIOR LEADS  Compared to ECG 2016 09:21:53  ST (T wave) deviation still present  no significant changes  Electronically Signed On 2018 11:30:24 PST by ERNESTO HENDERSON MD      ISTAT CO2   Result Value Ref Range    Istat CO2 23 20 - 33 mmol/L   ISTAT GLUCOSE   Result Value Ref Range    Istat Glucose 98 65 - 99 mg/dL   ISTAT BUN   Result Value Ref Range    Istat Bun 17 8 - 22 mg/dL   ISTAT CREATININE   Result Value Ref Range    Istat Creatinine 0.8 0.5 - 1.4 mg/dL   ISTAT SODIUM   Result Value Ref Range    Istat Sodium 142 135 - 145 mmol/L   ISTAT POTASSIUM   Result Value Ref Range    Istat Potassium 3.6 3.6 - 5.5 mmol/L   ISTAT CHLORIDE   Result Value Ref Range    Istat Chloride 108 96 - 112 mmol/L      All labs reviewed by me.    EKG  Interpreted by me as shown above.     RADIOLOGY  DX-CHEST-PORTABLE (1 VIEW)   Final Result      Mild cardiomegaly.      Prominent atherosclerotic plaque.        The radiologist's interpretation of all radiological studies have been reviewed by me.    COURSE & MEDICAL DECISION MAKING  Pertinent Labs & Imaging studies reviewed. (See chart for details)    11:07 AM - Patient seen and examined at  bedside. Ordered chest x-ray, CBC, CMP, BNP, prothrombin time, APTT, lipase, troponin STAT, and EKG to evaluate his symptoms. The differential diagnoses include but are not limited to:2. Coronary syndrome. I discussed with the patient I will focus on his heart and falling at this point. I will order labs and an EKG to further assess his condition.     1:59 PM Reviewed the patient's EKG.    2:05 PM Patient was reevaluated at bedside. Patient is increasingly agitated and expresses his desire to go home. I discussed with the patient I am still waiting on lab results, however explained his EKG results to him. I recommended follow up with his primary care and to receive a stress test. I then explained signing out AMA to him if he so desires however, I discussed the risks of leaving AMA. Patient states he understands the risks.     The patient is leaving against medical advice.  I discussed with the patient the risks of leaving without receiving appropriate care to include permanent disability or death.  I have discussed possible alternatives.  The patient is not intoxicated.  The patient is a capable decision maker and understands the risks and benefits of their decision.  While I certainly disagree with the patient's decision, I respect the patient's autonomy and will not keep them here against their will.  They understand that their decision to leave can be reversed at any time and they can return to us at any time for any reason at all.       Decision Making:  Is presents for evaluation. 3. Possible patient may just had a vasovagal event causing syncope. However, when he described that he had chest pain, grab his chest and passed out to the floor. I'm very concerned about the possibility of acute coronary syndrome. When relating this to the patient. He stated that he wanted to go home. I'm giving the patient the risks and benefits. At this point, the patient wishes to leave against medical advice as above. Recommend  follow his primary care physician. I've given all reasonable attempt for outpatient follow-up, as well as inpatient treatment. The patient wishes to leave against medical advice.    The patient will return for new or worsening symptoms and is stable at the time of discharge.    DISPOSITION:  Patient will be discharged home in stable condition.    FOLLOW UP:  Christopher Mantilla M.D.  [address]  [place] [address]  [phone]    Schedule an appointment as soon as possible for a visit  Return if any symptoms worsen      FINAL IMPRESSION  1. Left against medical advice    2. Syncope, unspecified syncope type    3. Chest pain, unspecified type          I, Darwin Ponce (Scribe), am scribing for, and in the presence of, Mohit Leblanc M.D..    Electronically signed by: Darwin Ponce (Scribe), 1/9/2018    IMohit M.D. personally performed the services described in this documentation, as scribed by Darwin Ponce in my presence, and it is both accurate and complete.    The note accurately reflects work and decisions made by me.  Mohit Leblanc  1/9/2018  5:23 PM

## 2018-01-10 ENCOUNTER — TELEPHONE (OUTPATIENT)
Dept: MEDICAL GROUP | Facility: PHYSICIAN GROUP | Age: 73
End: 2018-01-10

## 2018-01-10 ENCOUNTER — PATIENT OUTREACH (OUTPATIENT)
Dept: HEALTH INFORMATION MANAGEMENT | Facility: OTHER | Age: 73
End: 2018-01-10

## 2018-01-10 DIAGNOSIS — R55 SYNCOPE, UNSPECIFIED SYNCOPE TYPE: ICD-10-CM

## 2018-01-10 NOTE — TELEPHONE ENCOUNTER
Dr Mantilla pt had an appointment set yesterday for his initial visit with [facility],however pt had a fall in his bathroom yesterday and was taken to ER . Faith with [facility] needs an ok from you to go ahead with his PT for LBP. Will he need appointment here to be cleared for PT?

## 2018-01-11 DIAGNOSIS — M19.90 ARTHRITIS: ICD-10-CM

## 2018-01-11 RX ORDER — MELOXICAM 7.5 MG/1
7.5 TABLET ORAL DAILY
Qty: 30 TAB | Refills: 0 | Status: SHIPPED | OUTPATIENT
Start: 2018-01-11 | End: 2018-01-17

## 2018-01-15 ENCOUNTER — PATIENT OUTREACH (OUTPATIENT)
Dept: HEALTH INFORMATION MANAGEMENT | Facility: OTHER | Age: 73
End: 2018-01-15

## 2018-01-15 NOTE — PROGRESS NOTES
Pt referred to  care coordination (CC) by RN discharge caller with report of having questions related to Advanced Directives. Outreach call to pt to introduce self, discuss role of  CC and pt's identified needs.  Pt reported he would like to complete an Advanced Directive and appoint his daughter to be his healthcare power of  but needs assistance in completing the forms as he has difficulties with reading. Informed pt that [facility] host free Advanced Directive workshops to assist individuals in completing there documents. Pt reported this would be helpful and indicated he would like to attend a class.     Briefly discussed locations of classes and times of day. Pt reported the classes at [address] and [address] would be best as they are closer and he rides the bus for transportation. Discussed transportation difficulties with pt and if he would be interested in RTC Access. Pt declined reporting he likes taking the fixed bus route and does not need RTC Access at this time.     Pt denies any other social needs at this time including difficulties with his ADLs or IADLs or difficulties meeting his basic needs. Pt currently resides in a home with his girlfriend and her daughters.     Plan:  Pt added to  CC and care plan initiated.

## 2018-01-17 ENCOUNTER — OFFICE VISIT (OUTPATIENT)
Dept: MEDICAL GROUP | Facility: PHYSICIAN GROUP | Age: 73
End: 2018-01-17
Payer: MEDICARE

## 2018-01-17 VITALS
BODY MASS INDEX: 20 KG/M2 | HEIGHT: 68 IN | DIASTOLIC BLOOD PRESSURE: 78 MMHG | SYSTOLIC BLOOD PRESSURE: 132 MMHG | RESPIRATION RATE: 14 BRPM | TEMPERATURE: 98.6 F | WEIGHT: 132 LBS | OXYGEN SATURATION: 99 % | HEART RATE: 91 BPM

## 2018-01-17 DIAGNOSIS — D64.9 NORMOCYTIC ANEMIA: ICD-10-CM

## 2018-01-17 DIAGNOSIS — I35.0 AORTIC STENOSIS, MODERATE: ICD-10-CM

## 2018-01-17 DIAGNOSIS — R07.9 CHEST PAIN, UNSPECIFIED TYPE: ICD-10-CM

## 2018-01-17 DIAGNOSIS — M54.50 ACUTE MIDLINE LOW BACK PAIN WITHOUT SCIATICA: ICD-10-CM

## 2018-01-17 DIAGNOSIS — I65.23 BILATERAL CAROTID ARTERY STENOSIS: ICD-10-CM

## 2018-01-17 DIAGNOSIS — R55 SYNCOPE, UNSPECIFIED SYNCOPE TYPE: ICD-10-CM

## 2018-01-17 PROCEDURE — 99214 OFFICE O/P EST MOD 30 MIN: CPT | Performed by: INTERNAL MEDICINE

## 2018-01-17 RX ORDER — OMEPRAZOLE 20 MG/1
20 CAPSULE, DELAYED RELEASE ORAL DAILY
Qty: 90 CAP | Refills: 1 | Status: SHIPPED | OUTPATIENT
Start: 2018-01-17 | End: 2020-01-23

## 2018-01-22 NOTE — PROGRESS NOTES
Subjective:   Remi Matt is a 72 y.o. male here today for post discharge follow up    71 y/o M smoker, COPD, mild carotid stenosis presented as a follow up from post ER. He reports that 01/09 he was in restroom while he was washing his face, he collapsed. He tells me that prior to that he had mild chest pain. His girlfriend called 911. He was out only for few seconds and he was aware afterward. He tells me that chest pain, was sharp, 3-4/10, lasted only few sec, no radiation. He was kept in the hospital for few hours. Trop negative.  CXR unremarkable. Electrolytes wnl. No palpitations. He left AMA, and did not wanted to stay in the hospital. He denies any more events. He has had similar episode few months ago n [place]. He left AMA even at that time. He tells me that he is going back to [place], and he does not have time to see specialist. No orthopnea, neuro deficits, well hydrated. BP stable. Hbg had dropped. He denies melanotic stool or abdominal pain.     Current medicines (including changes today)  Current Outpatient Prescriptions   Medication Sig Dispense Refill   • omeprazole (PRILOSEC) 20 MG delayed-release capsule Take 1 Cap by mouth every day. 90 Cap 1   • CHANTIX 0.5 MG tablet TAKE 1 TABLET BY MOUTH ONCE DAILY FOR 3 DAYS THEN 1 TAB TWICE DAILY FOR 4 DAYS THEN 2 TABS ONCE KHALIDA 56 Tab 0   • cyclobenzaprine (FLEXERIL) 5 MG tablet Take 1 Tab by mouth at bedtime as needed for up to 30 days. 30 Tab 0   • hydrocodone-acetaminophen (NORCO) 5-325 MG Tab per tablet Take 1-2 Tabs by mouth at bedtime as needed for up to 20 days. 20 Tab 0   • Omega-3 Fatty Acids (FISH OIL) 1200 MG Cap Take 1,200 mg by mouth every day.     • Multiple Vitamins-Minerals (CENTRUM SILVER ADULT 50+ PO) Take 1 tablet by mouth every day.     • Ascorbic Acid (VITAMIN C) 1000 MG Tab Take 1,000 mg by mouth every day.     • Calcium 600 MG Tab Take 600 mg by mouth every day.     • COMBIVENT RESPIMAT  MCG/ACT Aero Soln INHALE 2 PUFFS BY  MOUTH EVERY 6 HOURS AS NEEDED 1 Inhaler 3   • albuterol (VENTOLIN HFA) 108 (90 BASE) MCG/ACT Aero Soln inhalation aerosol USE 2 PUFFS BY INHALATIONS EVERY 6 HOURS AS NEEDED FOR SHORTNESS OF BREATH 3 Inhaler 3   • SPIRIVA HANDIHALER 18 MCG Cap INHALE 1 CAP BY MOUTH EVERY DAY. AT THE SAME TIME EVERY DAY 90 Cap 3   • ipratropium-albuterol (DUONEB) 0.5-2.5 (3) MG/3ML nebulizer solution USE 1 VIAL INTO NEBULIZER 2 TIMES DAILY AS NEEDED FOR SHORTNESS OF BREATH 180 mL 11   • Fluticasone Furoate-Vilanterol 100-25 MCG/INH AEROSOL POWDER, BREATH ACTIVATED Inhale 1 Puff by mouth every day. 3 Each 11   • aspirin EC 81 MG EC tablet Take 1 Tab by mouth every day. 100 Tab 0     No current facility-administered medications for this visit.      He  has a past medical history of COPD (chronic obstructive pulmonary disease) (CMS-[facility]).    Current Outpatient Prescriptions   Medication Sig Dispense Refill   • omeprazole (PRILOSEC) 20 MG delayed-release capsule Take 1 Cap by mouth every day. 90 Cap 1   • CHANTIX 0.5 MG tablet TAKE 1 TABLET BY MOUTH ONCE DAILY FOR 3 DAYS THEN 1 TAB TWICE DAILY FOR 4 DAYS THEN 2 TABS ONCE KHALIDA 56 Tab 0   • cyclobenzaprine (FLEXERIL) 5 MG tablet Take 1 Tab by mouth at bedtime as needed for up to 30 days. 30 Tab 0   • hydrocodone-acetaminophen (NORCO) 5-325 MG Tab per tablet Take 1-2 Tabs by mouth at bedtime as needed for up to 20 days. 20 Tab 0   • Omega-3 Fatty Acids (FISH OIL) 1200 MG Cap Take 1,200 mg by mouth every day.     • Multiple Vitamins-Minerals (CENTRUM SILVER ADULT 50+ PO) Take 1 tablet by mouth every day.     • Ascorbic Acid (VITAMIN C) 1000 MG Tab Take 1,000 mg by mouth every day.     • Calcium 600 MG Tab Take 600 mg by mouth every day.     • COMBIVENT RESPIMAT  MCG/ACT Aero Soln INHALE 2 PUFFS BY MOUTH EVERY 6 HOURS AS NEEDED 1 Inhaler 3   • albuterol (VENTOLIN HFA) 108 (90 BASE) MCG/ACT Aero Soln inhalation aerosol USE 2 PUFFS BY INHALATIONS EVERY 6 HOURS AS NEEDED FOR SHORTNESS OF  "BREATH 3 Inhaler 3   • SPIRIVA HANDIHALER 18 MCG Cap INHALE 1 CAP BY MOUTH EVERY DAY. AT THE SAME TIME EVERY DAY 90 Cap 3   • ipratropium-albuterol (DUONEB) 0.5-2.5 (3) MG/3ML nebulizer solution USE 1 VIAL INTO NEBULIZER 2 TIMES DAILY AS NEEDED FOR SHORTNESS OF BREATH 180 mL 11   • Fluticasone Furoate-Vilanterol 100-25 MCG/INH AEROSOL POWDER, BREATH ACTIVATED Inhale 1 Puff by mouth every day. 3 Each 11   • aspirin EC 81 MG EC tablet Take 1 Tab by mouth every day. 100 Tab 0     No current facility-administered medications for this visit.        Allergies as of 01/17/2018   • (No Known Allergies)       Social History     Social History   • Marital status:      Spouse name: N/A   • Number of children: N/A   • Years of education: N/A     Occupational History   • Not on file.     Social History Main Topics   • Smoking status: Former Smoker     Packs/day: 0.25     Years: 20.00     Types: Cigarettes     Quit date: 7/7/2017   • Smokeless tobacco: Never Used   • Alcohol use 0.0 oz/week      Comment: occ   • Drug use: No   • Sexual activity: Yes     Partners: Female      Comment: single retired      Other Topics Concern   • Not on file     Social History Narrative   • No narrative on file        Family History   Problem Relation Age of Onset   • Cancer Neg Hx    • Diabetes Neg Hx    • Heart Disease Neg Hx    • Stroke Neg Hx    • Heart Attack Neg Hx        Past Surgical History:   Procedure Laterality Date   • ARTHROSCOPY, KNEE  1978    meniscus, right   • HERNIA REPAIR  child   • TONSILLECTOMY  child       ROS   All systems reviewed are negative except for HPI         Objective:     Blood pressure 132/78, pulse 91, temperature 37 °C (98.6 °F), resp. rate 14, height 1.725 m (5' 7.9\"), weight 59.9 kg (132 lb), SpO2 99 %. Body mass index is 20.13 kg/m².  Physical Exam:  Constitutional: Alert, no distress. Poor historian, tangential in thought  Skin: Warm, dry, good turgor, no rashes in visible " "areas.  Eye: Equal, round and reactive, conjunctiva clear, lids normal.  ENMT: Lips without lesions, upper and lower denture, oropharynx clear.  Neck: Trachea midline, no masses, no thyromegaly. No cervical or supraclavicular lymphadenopathy, + slight bruit on the left side.   Respiratory: Unlabored respiratory effort, lungs clear to auscultation, no wheezes, no ronchi.  Cardiovascular: Normal S1, S2, no murmur, no edema.  Abdomen: Soft, non-tender, no masses, no hepatosplenomegaly.  Psych: Alert and oriented x3, normal affect and mood.  Neurologic: Alert & oriented x 3   - Language: Normal rhythm and rate, able to repeat the phrase \"No ifs, ands, or buts\"   - Memory, knowledge, attention   - Cranial Nerves: CNII-XII intact. No tongue deviation, Uvula midline, shoulder shrug equal   - Coordination: Finger to nose smooth and intact bilaterally. Heel to shin intact bilaterally.   - Motor: 5/5 in upper extremities and lower extremities   - DTR's: 1+ at patella. - Sensation: Tactile sensation intact throughout,       Assessment and Plan:   The following treatment plan was discussed    1. Syncope, unspecified syncope type  2. Aortic stenosis, moderate  3. Bilateral carotid artery stenosis mild  Repeat carotid US, cardiac test. Chest pain atypical but cannot rule CAD. From previous echocardiogram showed moderate aortic stenosis. Is that the cause??   It is very concerning for cardiac syncope. Pt is not compliant with seeing specialist. I will go ahead and place all referrals, just in case he will be able to make it   - US-CAROTID DOPPLER; Future  - ECHOCARDIOGRAM COMP W/O CONT; Future  - NM-CARDIAC STRESS TEST; Future  - REFERRAL TO CARDIOLOGY      4. Normocytic anemia  I advise to start omeprazole. Pt recently is taking NSAIDs regular due to pain from OA. I advise to avoid NSAIDs. Tylenol, gabapentin does not help. Muscle relaxant does not help. Stable at this time. Avoid NSAIDs, continue to monitor . Not compliant   - " omeprazole (PRILOSEC) 20 MG delayed-release capsule; Take 1 Cap by mouth every day.  Dispense: 90 Cap; Refill: 1    5. Acute midline low back pain without sciatica  He cannot go for physical therapy until get cleared by cards. Syncope is concerning for cardiology etiology. Continue current pain management.     6. Chest pain, unspecified type  Follow up with tests      Followup: Return in about 4 weeks (around 2/14/2018), or if symptoms worsen or fail to improve, for Short.

## 2018-01-29 ENCOUNTER — OFFICE VISIT (OUTPATIENT)
Dept: CARDIOLOGY | Facility: MEDICAL CENTER | Age: 73
End: 2018-01-29
Payer: MEDICARE

## 2018-01-29 VITALS
HEIGHT: 68 IN | BODY MASS INDEX: 19.7 KG/M2 | DIASTOLIC BLOOD PRESSURE: 80 MMHG | WEIGHT: 130 LBS | HEART RATE: 80 BPM | SYSTOLIC BLOOD PRESSURE: 130 MMHG | OXYGEN SATURATION: 95 %

## 2018-01-29 DIAGNOSIS — Z91.199 PERSONAL HISTORY OF NONCOMPLIANCE WITH MEDICAL TREATMENT: ICD-10-CM

## 2018-01-29 DIAGNOSIS — J41.1 MUCOPURULENT CHRONIC BRONCHITIS (HCC): ICD-10-CM

## 2018-01-29 DIAGNOSIS — I35.0 AORTIC STENOSIS, MODERATE: ICD-10-CM

## 2018-01-29 DIAGNOSIS — R55 SYNCOPE AND COLLAPSE: ICD-10-CM

## 2018-01-29 LAB — EKG IMPRESSION: NORMAL

## 2018-01-29 PROCEDURE — 99204 OFFICE O/P NEW MOD 45 MIN: CPT | Performed by: INTERNAL MEDICINE

## 2018-01-29 PROCEDURE — 93000 ELECTROCARDIOGRAM COMPLETE: CPT | Performed by: INTERNAL MEDICINE

## 2018-01-29 NOTE — PROGRESS NOTES
Arrhythmia Clinic Note (New patient)     DOS: 1/29/2018    Referring physician: Christopher Mantilla    Chief complaint/Reason for consult: Syncope    HPI:  Patient is a 71 yo WM with history of COPD. He had a work up for syncope he says years ago when he passed out unexpectedly that was negative for cardiac causes. He did have an echo several years ago showing moderate AS with ?bicuspid valve. He was doing well until he thinks 3-4 weeks ago when he was brushing his teeth. He suddenly felt light headed then lost consciousness for just a few seconds. Paramedics were called. He came to almost right away and was taken to ED. He said he did not see an MD for about 5 hours, got frustrated and left AMA. Denied CP/palpitations.    ROS (+ highlighted in red):  Constitutional: Fevers/chills/fatigue/weightloss  HEENT: Blurry vision/eye pain/sore throat/hearing loss  Respiratory: Shortness of breath/cough  Cardiovascular: Chest pain/palpitations/edema/orthopnea/syncope  GI: Nausea/vomitting/diarrhea  MSK: Arthralgias/myagias/muscle weakness  Skin: Rash/sores  Neurological: Numbness/tremors/vertigo  Endocrine: Excessive thirst/polyuria/cold intolerance/heat intolerance  Psych: Depression/anxiety    Past Medical History:   Diagnosis Date   • COPD (chronic obstructive pulmonary disease) (CMS-[facility])        Past Surgical History:   Procedure Laterality Date   • ARTHROSCOPY, KNEE  1978    meniscus, right   • HERNIA REPAIR  child   • TONSILLECTOMY  child       Social History     Social History   • Marital status:      Spouse name: N/A   • Number of children: N/A   • Years of education: N/A     Occupational History   • Not on file.     Social History Main Topics   • Smoking status: Former Smoker     Packs/day: 0.25     Years: 20.00     Types: Cigarettes     Quit date: 7/7/2017   • Smokeless tobacco: Never Used   • Alcohol use 0.0 oz/week      Comment: occ   • Drug use: No   • Sexual activity: Yes     Partners: Female      Comment: single  retired      Other Topics Concern   • Not on file     Social History Narrative   • No narrative on file       Family History   Problem Relation Age of Onset   • Cancer Neg Hx    • Diabetes Neg Hx    • Heart Disease Neg Hx    • Stroke Neg Hx    • Heart Attack Neg Hx        No Known Allergies    Current Outpatient Prescriptions   Medication Sig Dispense Refill   • CHANTIX 0.5 MG tablet TAKE 1 TABLET BY MOUTH ONCE DAILY FOR 3 DAYS THEN 1 TAB TWICE DAILY FOR 4 DAYS THEN 2 TABS ONCE KHALIDA 56 Tab 0   • COMBIVENT RESPIMAT  MCG/ACT Aero Soln INHALE 2 PUFFS BY MOUTH EVERY 6 HOURS AS NEEDED 1 Inhaler 3   • albuterol (VENTOLIN HFA) 108 (90 BASE) MCG/ACT Aero Soln inhalation aerosol USE 2 PUFFS BY INHALATIONS EVERY 6 HOURS AS NEEDED FOR SHORTNESS OF BREATH 3 Inhaler 3   • ipratropium-albuterol (DUONEB) 0.5-2.5 (3) MG/3ML nebulizer solution USE 1 VIAL INTO NEBULIZER 2 TIMES DAILY AS NEEDED FOR SHORTNESS OF BREATH 180 mL 11   • Fluticasone Furoate-Vilanterol 100-25 MCG/INH AEROSOL POWDER, BREATH ACTIVATED Inhale 1 Puff by mouth every day. 3 Each 11   • aspirin EC 81 MG EC tablet Take 1 Tab by mouth every day. 100 Tab 0   • omeprazole (PRILOSEC) 20 MG delayed-release capsule Take 1 Cap by mouth every day. (Patient not taking: Reported on 1/29/2018) 90 Cap 1   • cyclobenzaprine (FLEXERIL) 5 MG tablet Take 1 Tab by mouth at bedtime as needed for up to 30 days. (Patient not taking: Reported on 1/29/2018) 30 Tab 0   • Omega-3 Fatty Acids (FISH OIL) 1200 MG Cap Take 1,200 mg by mouth every day.     • Multiple Vitamins-Minerals (CENTRUM SILVER ADULT 50+ PO) Take 1 tablet by mouth every day.     • Ascorbic Acid (VITAMIN C) 1000 MG Tab Take 1,000 mg by mouth every day.     • Calcium 600 MG Tab Take 600 mg by mouth every day.     • SPIRIVA HANDIHALER 18 MCG Cap INHALE 1 CAP BY MOUTH EVERY DAY. AT THE SAME TIME EVERY DAY (Patient not taking: Reported on 1/29/2018) 90 Cap 3     No current facility-administered  "medications for this visit.      Physical Exam:  Vitals:    01/29/18 0746   BP: 130/80   Pulse: 80   SpO2: 95%   Weight: 59 kg (130 lb)   Height: 1.727 m (5' 8\")     General appearance: NAD, conversant   Eyes: anicteric sclerae, moist conjunctivae; no lid-lag; PERRLA  HENT: Atraumatic; oropharynx clear with moist mucous membranes and no mucosal ulcerations; normal hard and soft palate  Neck: Trachea midline; FROM, supple, no thyromegaly or lymphadenopathy  Lungs: CTA, with normal respiratory effort and no intercostal retractions  CV: RRR, 2/6 systolic murmur, no JVD  Abdomen: Soft, non-tender; no masses or HSM  Extremities: No peripheral edema or extremity lymphadenopathy  Skin: Normal temperature, turgor and texture; no rash, ulcers or subcutaneous nodules  Psych: Appropriate affect, alert and oriented to person, place and time    Data:  Labs reviewed    Prior echo/stress reviewed:  Normal left ventricular chamber size. Normal left ventricular wall   thickness. Normal left ventricular systolic function. Ejection fraction   is measured to be 60% by Rosenthal's biplane 2D analysis. Grade I   diastolic dysfunction.  Moderately dilated right ventricle.  Enlarged right atrium.  Possible bicuspid aortic valve with leaflet calcification. Mild to   moderate aortic stenosis. Transvalvular gradients are Peak: 38mmHg,   Mean: 22mmHg. Aortic valve area calculated from the continuity equation   is 0.8 cm sq. Dimensionless index is(0.32).    EKG interpreted by me:  Sinus rhythm, IRBBB    Impression/Plan:  1. Syncope and collapse  EKG   2. Aortic stenosis, moderate     3. Mucopurulent chronic bronchitis (CMS-HCC)     4. Personal history of noncompliance with medical treatment       -He had a ?bicuspid valve with moderate stenosis a couple of years on echo, I worry that this may have progressed, though on exam he does not exhibit any signs of severe AS such as late peaking murmur, absent S2, or pulsus parvus e tardus  -Nothing on " this current EKG suggesting conduction abnormalities  -Let's wait to see what his echo and stress show  -If negative, we can consider some ambulatory monitoring    Zach Jiménez MD

## 2018-02-01 ENCOUNTER — PATIENT OUTREACH (OUTPATIENT)
Dept: HEALTH INFORMATION MANAGEMENT | Facility: OTHER | Age: 73
End: 2018-02-01

## 2018-02-01 NOTE — PROGRESS NOTES
Outreach call to pt to determine if he received Advanced Directive flyer for workshop classes LSW mailed to him in Jan and if he was able to RSVP for a class. Pt reported he did not receive the flyer and is unable to attend classes in Feb due to doctor appts. LSW provided further education on the types of Advanced Directive and what would be reviewed in the class. Pt reported he is still interested in attending possibly for the March classes.     Plan:  LSW remailed Advanced Directive flyer to pt for workshops dates/times.  LSW will follow back up with pt again prior to graduation to determine if he has any further questions related to Advanced Directives.

## 2018-02-02 DIAGNOSIS — J42 CHRONIC BRONCHITIS, UNSPECIFIED CHRONIC BRONCHITIS TYPE (HCC): ICD-10-CM

## 2018-02-08 ENCOUNTER — HOSPITAL ENCOUNTER (OUTPATIENT)
Dept: CARDIOLOGY | Facility: MEDICAL CENTER | Age: 73
End: 2018-02-08
Attending: INTERNAL MEDICINE
Payer: MEDICARE

## 2018-02-08 ENCOUNTER — HOSPITAL ENCOUNTER (OUTPATIENT)
Dept: RADIOLOGY | Facility: MEDICAL CENTER | Age: 73
End: 2018-02-08
Attending: INTERNAL MEDICINE
Payer: MEDICARE

## 2018-02-08 DIAGNOSIS — R55 SYNCOPE, UNSPECIFIED SYNCOPE TYPE: ICD-10-CM

## 2018-02-08 LAB
LV EJECT FRACT  99904: 55
LV EJECT FRACT MOD 2C 99903: 50.34
LV EJECT FRACT MOD 4C 99902: 53.01
LV EJECT FRACT MOD BP 99901: 54.27

## 2018-02-08 PROCEDURE — 93306 TTE W/DOPPLER COMPLETE: CPT | Mod: 26 | Performed by: INTERNAL MEDICINE

## 2018-02-08 PROCEDURE — 93880 EXTRACRANIAL BILAT STUDY: CPT

## 2018-02-08 PROCEDURE — 93880 EXTRACRANIAL BILAT STUDY: CPT | Mod: 26 | Performed by: SURGERY

## 2018-02-08 PROCEDURE — 93306 TTE W/DOPPLER COMPLETE: CPT

## 2018-02-12 ENCOUNTER — TELEPHONE (OUTPATIENT)
Dept: MEDICAL GROUP | Facility: PHYSICIAN GROUP | Age: 73
End: 2018-02-12

## 2018-02-20 ENCOUNTER — APPOINTMENT (OUTPATIENT)
Dept: RADIOLOGY | Facility: MEDICAL CENTER | Age: 73
End: 2018-02-20
Attending: INTERNAL MEDICINE
Payer: MEDICARE

## 2018-02-22 ENCOUNTER — OFFICE VISIT (OUTPATIENT)
Dept: MEDICAL GROUP | Facility: PHYSICIAN GROUP | Age: 73
End: 2018-02-22
Payer: MEDICARE

## 2018-02-22 ENCOUNTER — HOSPITAL ENCOUNTER (OUTPATIENT)
Dept: RADIOLOGY | Facility: MEDICAL CENTER | Age: 73
End: 2018-02-22
Attending: NURSE PRACTITIONER
Payer: MEDICARE

## 2018-02-22 ENCOUNTER — TELEPHONE (OUTPATIENT)
Dept: MEDICAL GROUP | Facility: PHYSICIAN GROUP | Age: 73
End: 2018-02-22

## 2018-02-22 ENCOUNTER — HOSPITAL ENCOUNTER (OUTPATIENT)
Dept: LAB | Facility: MEDICAL CENTER | Age: 73
End: 2018-02-22
Attending: NURSE PRACTITIONER
Payer: MEDICARE

## 2018-02-22 VITALS
TEMPERATURE: 97.2 F | BODY MASS INDEX: 20.16 KG/M2 | RESPIRATION RATE: 16 BRPM | HEIGHT: 68 IN | HEART RATE: 87 BPM | SYSTOLIC BLOOD PRESSURE: 138 MMHG | OXYGEN SATURATION: 96 % | DIASTOLIC BLOOD PRESSURE: 78 MMHG | WEIGHT: 133 LBS

## 2018-02-22 DIAGNOSIS — R10.31 RLQ ABDOMINAL PAIN: ICD-10-CM

## 2018-02-22 LAB — CREAT SERPL-MCNC: 0.77 MG/DL (ref 0.5–1.4)

## 2018-02-22 PROCEDURE — 99214 OFFICE O/P EST MOD 30 MIN: CPT | Performed by: NURSE PRACTITIONER

## 2018-02-22 PROCEDURE — 82565 ASSAY OF CREATININE: CPT

## 2018-02-22 PROCEDURE — 74177 CT ABD & PELVIS W/CONTRAST: CPT

## 2018-02-22 PROCEDURE — 36415 COLL VENOUS BLD VENIPUNCTURE: CPT

## 2018-02-22 PROCEDURE — 700117 HCHG RX CONTRAST REV CODE 255: Performed by: NURSE PRACTITIONER

## 2018-02-22 RX ADMIN — IOHEXOL 50 ML: 240 INJECTION, SOLUTION INTRATHECAL; INTRAVASCULAR; INTRAVENOUS; ORAL at 15:10

## 2018-02-22 RX ADMIN — IOHEXOL 100 ML: 350 INJECTION, SOLUTION INTRAVENOUS at 15:10

## 2018-02-22 ASSESSMENT — PAIN SCALES - GENERAL: PAINLEVEL: 7=MODERATE-SEVERE PAIN

## 2018-02-22 NOTE — ASSESSMENT & PLAN NOTE
"This is a new problem. Started 3 weeks ago. States \"sometimes\" it doesn't hurt. States it is an aching pain. States he cant lie on his back. States it is difficult to fall asleep. States he has taken ibuprofen which improves the pain. States the pain is always in the same area, states it will be sharp with specific movements. Denies injury or fall. Denies nausea/vomiting. States he has changes in stool/constipation and has appointment for colonoscopy. States that he has not had a BM in several days. States pain is worse when he hasn't had a BM. Denies fever/chills. States pain is worse if he sits too long.   "

## 2018-02-22 NOTE — PROGRESS NOTES
"Chief Complaint   Patient presents with   • RLQ Pain     x 3 weeks px comes and goes        HISTORY OF PRESENT ILLNESS: Patient is a 72 y.o. male established patient who presents today to discuss abdominal pain.    RLQ abdominal pain  This is a new problem. Started 3 weeks ago. States \"sometimes\" it doesn't hurt. States it is an aching pain. States he cant lie on his back. States it is difficult to fall asleep. States he has taken ibuprofen which improves the pain. States the pain is always in the same area, states it will be sharp with specific movements. Denies injury or fall. Denies nausea/vomiting. States he has changes in stool/constipation and has appointment for colonoscopy. States that he has not had a BM in several days. States pain is worse when he hasn't had a BM. Denies fever/chills. States pain is worse if he sits too long.       Patient Active Problem List    Diagnosis Date Noted   • RLQ abdominal pain 02/22/2018   • Normocytic anemia 01/17/2018   • Chest pain 01/17/2018   • Acute midline low back pain without sciatica 01/02/2018   • Arthritis 07/07/2017   • History of nicotine dependence 11/06/2015   • Aortic stenosis, moderate 09/30/2015   • Bilateral carotid artery stenosis mild 09/24/2015   • Syncope 09/23/2015   • Elevated PSA 03/27/2015   • COPD (chronic obstructive pulmonary disease) (CMS-HCC)        Allergies:Patient has no known allergies.    Current Outpatient Prescriptions   Medication Sig Dispense Refill   • BREO ELLIPTA 100-25 MCG/INH AEROSOL POWDER, BREATH ACTIVATED INHALE 1 PUFF BY MOUTH EVERY DAY. 1 Each 3   • omeprazole (PRILOSEC) 20 MG delayed-release capsule Take 1 Cap by mouth every day. (Patient not taking: Reported on 1/29/2018) 90 Cap 1   • CHANTIX 0.5 MG tablet TAKE 1 TABLET BY MOUTH ONCE DAILY FOR 3 DAYS THEN 1 TAB TWICE DAILY FOR 4 DAYS THEN 2 TABS ONCE KHALIDA 56 Tab 0   • Omega-3 Fatty Acids (FISH OIL) 1200 MG Cap Take 1,200 mg by mouth every day.     • Multiple " Vitamins-Minerals (CENTRUM SILVER ADULT 50+ PO) Take 1 tablet by mouth every day.     • Ascorbic Acid (VITAMIN C) 1000 MG Tab Take 1,000 mg by mouth every day.     • Calcium 600 MG Tab Take 600 mg by mouth every day.     • COMBIVENT RESPIMAT  MCG/ACT Aero Soln INHALE 2 PUFFS BY MOUTH EVERY 6 HOURS AS NEEDED 1 Inhaler 3   • albuterol (VENTOLIN HFA) 108 (90 BASE) MCG/ACT Aero Soln inhalation aerosol USE 2 PUFFS BY INHALATIONS EVERY 6 HOURS AS NEEDED FOR SHORTNESS OF BREATH 3 Inhaler 3   • SPIRIVA HANDIHALER 18 MCG Cap INHALE 1 CAP BY MOUTH EVERY DAY. AT THE SAME TIME EVERY DAY (Patient not taking: Reported on 2018) 90 Cap 3   • ipratropium-albuterol (DUONEB) 0.5-2.5 (3) MG/3ML nebulizer solution USE 1 VIAL INTO NEBULIZER 2 TIMES DAILY AS NEEDED FOR SHORTNESS OF BREATH 180 mL 11   • aspirin EC 81 MG EC tablet Take 1 Tab by mouth every day. 100 Tab 0     No current facility-administered medications for this visit.        Social History   Substance Use Topics   • Smoking status: Former Smoker     Packs/day: 0.25     Years: 20.00     Types: Cigarettes     Quit date: 2017   • Smokeless tobacco: Never Used   • Alcohol use 0.0 oz/week      Comment: occ       Family Status   Relation Status   • Mother Alive   • Father    • Neg Hx      Family History   Problem Relation Age of Onset   • Cancer Neg Hx    • Diabetes Neg Hx    • Heart Disease Neg Hx    • Stroke Neg Hx    • Heart Attack Neg Hx        Review of Systems:   Constitutional:  Negative for fever, chills, weight loss and malaise/fatigue.   HEENT:  Negative for ear pain, nosebleeds, congestion, sore throat and neck pain.    Eyes:  Negative for blurred vision.   Respiratory:  Negative for cough, sputum production, shortness of breath and wheezing.    Cardiovascular:  Negative for chest pain, palpitations, orthopnea and leg swelling.   Gastrointestinal:  Negative for heartburn, nausea, vomiting and positive abdominal pain.   Genitourinary:  Negative  "for dysuria, urgency and frequency.   Musculoskeletal:  Negative for myalgias, back pain and joint pain.   Skin:  Negative for rash and itching.   Neurological:  Negative for dizziness, tingling, tremors, sensory change, focal weakness and headaches.   Endo/Heme/Allergies:  Does not bruise/bleed easily.   Psychiatric/Behavioral:  Negative for depression, suicidal ideas and memory loss.  The patient is not nervous/anxious and does not have insomnia.    All other systems reviewed and are negative except as in HPI.    Exam:  Blood pressure 138/78, pulse 87, temperature 36.2 °C (97.2 °F), resp. rate 16, height 1.727 m (5' 8\"), weight 60.3 kg (133 lb), SpO2 96 %.  General:  Normal appearing. No distress.  Pulmonary:  Clear to ausculation.  Normal effort. No rales, ronchi, or wheezing.  Cardiovascular:  Regular rate and rhythm without murmur. Carotid and radial pulses are intact and equal bilaterally.  Abdomen:  Soft, nondistended. Hyperactive bowel sounds. Liver and spleen are not palpable. Positive pain to palpation over lower quadrant, positive rebound tenderness. No tenderness in RUQ, LLQ, some periumbilical tenderness.  Neurologic:  Grossly nonfocal  Lymph:  No cervical, supraclavicular or axillary lymph nodes are palpable  Skin:  Warm and dry.  No obvious lesions.  Musculoskeletal:  Normal gait. No extremity cyanosis, clubbing, or edema.  Psych:  Normal mood and affect. Alert and oriented x3. Judgment and insight is normal.      PLAN:    1. RLQ abdominal pain   patient regarding importance of being seen in the emergency room for fever, chills, any worsening pain. Patient is refusing to go to the emergency room at this time. As such that CT scan and stat creatinine are ordered to be completed today. Counseled patient regarding the importance of completing CT scan today. Concern for appendicitis.  - CT-ABDOMEN-PELVIS WITH; Future  - CREATININE; Future    Follow-up as needed. Reiterated the importance of patient " completing CT scan today, following up in the emergency room for any worsening symptoms discussed with patient concerning for appendicitis. Patient is encouraged to be seen in the emergency room for chest pain, palpitations, shortness of breath, dizziness, increasing abdominal pain or other concerning symptoms.      Please note that this dictation was created using voice recognition software. I have made every reasonable attempt to correct obvious errors, but I expect that there are errors of grammar and possibly content that I did not discover before finalizing the note.    Assessment/Plan:  1. RLQ abdominal pain  CT-ABDOMEN-PELVIS WITH    CREATININE          I have placed the below orders and discussed them with an approved delegating provider. The MA is performing the below orders under the direction of Dr. Mcpherson.

## 2018-02-22 NOTE — TELEPHONE ENCOUNTER
----- Message from JEANMARIE Cyr sent at 2/22/2018 10:42 AM PST -----  Please call pt and give lab results: Creatinine is within normal limits patient is okay for CT scan this afternoon.

## 2018-02-23 ENCOUNTER — TELEPHONE (OUTPATIENT)
Dept: MEDICAL GROUP | Facility: PHYSICIAN GROUP | Age: 73
End: 2018-02-23

## 2018-02-23 NOTE — TELEPHONE ENCOUNTER
Called and spoke to patient regarding lab results. Patient had no questions at this time. SALVADOR

## 2018-02-23 NOTE — TELEPHONE ENCOUNTER
----- Message from JEANMARIE Cyr sent at 2/22/2018  5:10 PM PST -----  Please call pt and give lab results: There was no acute abnormality abdomen or pelvis, they did not identify her appendix and it is likely surgically absent. Pain is likely related to constipation, I would recommend using MiraLAX once daily over the next several days as well as Colace stool softener twice daily. As constipation is a chronic issue  I would recommend using MiraLAX daily or every other day so that you are having soft bowel movements daily to every other day.

## 2018-02-26 ENCOUNTER — PATIENT OUTREACH (OUTPATIENT)
Dept: HEALTH INFORMATION MANAGEMENT | Facility: OTHER | Age: 73
End: 2018-02-26

## 2018-02-26 NOTE — PROGRESS NOTES
LSW outreach to pt to discuss confirm receipt of AD Workshop flyer by VAL Michelle. Pt reports that he has not received anything in the mail. LSW confirmed pt's address on file is correct. Offered to send pt a new workshop flyer in the mail. Pt agreeable to flyer being sent. Encouraged pt to reach out to VAL Michelle if he has any further questions/concerns.     Plan:  LSW to graduate pt from  , due to meeting goals.interventions

## 2018-03-01 ENCOUNTER — APPOINTMENT (OUTPATIENT)
Dept: RADIOLOGY | Facility: MEDICAL CENTER | Age: 73
End: 2018-03-01
Attending: INTERNAL MEDICINE
Payer: MEDICARE

## 2018-03-02 ENCOUNTER — OFFICE VISIT (OUTPATIENT)
Dept: MEDICAL GROUP | Facility: PHYSICIAN GROUP | Age: 73
End: 2018-03-02
Payer: MEDICARE

## 2018-03-02 ENCOUNTER — APPOINTMENT (OUTPATIENT)
Dept: RADIOLOGY | Facility: MEDICAL CENTER | Age: 73
End: 2018-03-02
Attending: INTERNAL MEDICINE
Payer: MEDICARE

## 2018-03-02 VITALS
HEART RATE: 96 BPM | RESPIRATION RATE: 16 BRPM | SYSTOLIC BLOOD PRESSURE: 114 MMHG | WEIGHT: 133 LBS | HEIGHT: 68 IN | TEMPERATURE: 99.3 F | BODY MASS INDEX: 20.16 KG/M2 | DIASTOLIC BLOOD PRESSURE: 60 MMHG | OXYGEN SATURATION: 97 %

## 2018-03-02 DIAGNOSIS — K59.09 OTHER CONSTIPATION: ICD-10-CM

## 2018-03-02 PROCEDURE — 99213 OFFICE O/P EST LOW 20 MIN: CPT | Performed by: PHYSICIAN ASSISTANT

## 2018-03-02 ASSESSMENT — PAIN SCALES - GENERAL: PAINLEVEL: NO PAIN

## 2018-03-02 NOTE — ASSESSMENT & PLAN NOTE
Acute. Started 4 weeks ago. States he was previously seen by a Colleague of [facility], Farhan Lucero on 02/22/18 for RLQ pain. States x-ray and CT scan was ordered. Results indicated constipation. Patient was advised to use colace stool softeners and Miralax. States he has been using MiraLAX twice daily and Colace stool softener twice daily with no alleviation of symptoms. States 5 days ago while at home he started experiencing severe lower abdominal pain and was transported via EMS to [facility]. States he was given and enema 3-4 times. States only fluid was coming out with no stool. States manual disimpaction was supposed to take place but procedure was not performed. States he is still experiencing lower abdominal pian. States pain is a constant aching pain that can develop in to a sharp pain. Symptoms are worse at night and with sitting for extended periods of time. States last BM was 4 weeks ago. States he can still can pass gas. States he is still able to eat but meals are smaller. States he has taken over-the-counter ibuprofen with mild improvement of pain symptoms.  Denies nausea, vomiting, fever, chills, melena, hematochezia. Patient states he is scheduled for a colonoscopy in the upcoming weeks.

## 2018-03-05 PROBLEM — K59.09 OTHER CONSTIPATION: Status: ACTIVE | Noted: 2018-02-22

## 2018-03-06 NOTE — PROGRESS NOTES
Chief Complaint   Patient presents with   • GI Problem     Pt can not go to the bath x 1 month       HISTORY OF PRESENT ILLNESS: Remi Matt is an established 72 y.o. male here to discuss the evaluation and management of:    Other constipation  Acute. Started 4 weeks ago. States he was previously seen by a Colleague of [facility], Farhan Lucero on 02/22/18 for RLQ pain. States CT scan was ordered. Results indicated constipation. Patient was advised to use colace stool softeners and Miralax. States he has been using MiraLAX twice daily and Colace stool softener twice daily with no alleviation of symptoms. States 5 days ago while at home he started experiencing severe lower abdominal pain and was transported via EMS to [facility]. States he was given and enema 3-4 times. States only fluid was coming out with no stool. States manual disimpaction was supposed to take place but procedure was not performed. States he is still experiencing lower abdominal pian. States pain is a constant aching pain that can develop in to a sharp pain. Symptoms are worse at night and with sitting for extended periods of time. States last BM was 4 weeks ago. States he can still can pass gas. States he is still able to eat but meals are smaller. States he has taken over-the-counter ibuprofen with mild improvement of pain symptoms.  Denies nausea, vomiting, fever, chills, melena, hematochezia. Patient states he is scheduled for a colonoscopy in the upcoming weeks.       Patient Active Problem List    Diagnosis Date Noted   • Other constipation 02/22/2018   • Normocytic anemia 01/17/2018   • Chest pain 01/17/2018   • Acute midline low back pain without sciatica 01/02/2018   • Arthritis 07/07/2017   • History of nicotine dependence 11/06/2015   • Aortic stenosis, moderate 09/30/2015   • Bilateral carotid artery stenosis mild 09/24/2015   • Syncope 09/23/2015   • Elevated PSA 03/27/2015   • COPD (chronic obstructive pulmonary disease)  (CMS-HCC)        Allergies:Patient has no known allergies.    Current Outpatient Prescriptions   Medication Sig Dispense Refill   • BREO ELLIPTA 100-25 MCG/INH AEROSOL POWDER, BREATH ACTIVATED INHALE 1 PUFF BY MOUTH EVERY DAY. 1 Each 3   • omeprazole (PRILOSEC) 20 MG delayed-release capsule Take 1 Cap by mouth every day. (Patient not taking: Reported on 2018) 90 Cap 1   • CHANTIX 0.5 MG tablet TAKE 1 TABLET BY MOUTH ONCE DAILY FOR 3 DAYS THEN 1 TAB TWICE DAILY FOR 4 DAYS THEN 2 TABS ONCE KHALIDA 56 Tab 0   • Omega-3 Fatty Acids (FISH OIL) 1200 MG Cap Take 1,200 mg by mouth every day.     • Multiple Vitamins-Minerals (CENTRUM SILVER ADULT 50+ PO) Take 1 tablet by mouth every day.     • Ascorbic Acid (VITAMIN C) 1000 MG Tab Take 1,000 mg by mouth every day.     • Calcium 600 MG Tab Take 600 mg by mouth every day.     • COMBIVENT RESPIMAT  MCG/ACT Aero Soln INHALE 2 PUFFS BY MOUTH EVERY 6 HOURS AS NEEDED 1 Inhaler 3   • albuterol (VENTOLIN HFA) 108 (90 BASE) MCG/ACT Aero Soln inhalation aerosol USE 2 PUFFS BY INHALATIONS EVERY 6 HOURS AS NEEDED FOR SHORTNESS OF BREATH 3 Inhaler 3   • SPIRIVA HANDIHALER 18 MCG Cap INHALE 1 CAP BY MOUTH EVERY DAY. AT THE SAME TIME EVERY DAY (Patient not taking: Reported on 2018) 90 Cap 3   • ipratropium-albuterol (DUONEB) 0.5-2.5 (3) MG/3ML nebulizer solution USE 1 VIAL INTO NEBULIZER 2 TIMES DAILY AS NEEDED FOR SHORTNESS OF BREATH 180 mL 11   • aspirin EC 81 MG EC tablet Take 1 Tab by mouth every day. 100 Tab 0     No current facility-administered medications for this visit.        Social History   Substance Use Topics   • Smoking status: Former Smoker     Packs/day: 0.25     Years: 20.00     Types: Cigarettes     Quit date: 2017   • Smokeless tobacco: Never Used   • Alcohol use 0.0 oz/week      Comment: occ       Family Status   Relation Status   • Mother Alive   • Father    • Neg Hx      Family History   Problem Relation Age of Onset   • Cancer Neg Hx    •  "Diabetes Neg Hx    • Heart Disease Neg Hx    • Stroke Neg Hx    • Heart Attack Neg Hx        ROS:  Review of Systems   Constitutional: Negative for fever, chills, weight loss and malaise/fatigue.   HENT: Negative for ear pain, nosebleeds, congestion, sore throat and neck pain.    Eyes: Negative for blurred vision.   Respiratory: Negative for cough, sputum production, shortness of breath and wheezing.    Cardiovascular: Negative for chest pain, palpitations, orthopnea and leg swelling.   Gastrointestinal: Negative for heartburn, nausea, vomiting and + for abdominal pain.   Genitourinary: Negative for dysuria, urgency and frequency.   Musculoskeletal: Negative for myalgias, back pain and joint pain.   Skin: Negative for rash and itching.   Neurological: Negative for dizziness, tingling, tremors, sensory change, focal weakness and headaches.   Endo/Heme/Allergies: Does not bruise/bleed easily.   Psychiatric/Behavioral: Negative for depression, suicidal ideas and memory loss.  The patient is not nervous/anxious and does not have insomnia.    All other systems reviewed and are negative except as in HPI.    Exam: Blood pressure 114/60, pulse 96, temperature 37.4 °C (99.3 °F), resp. rate 16, height 1.727 m (5' 8\"), weight 60.3 kg (133 lb), SpO2 97 %. Body mass index is 20.22 kg/m².  General: Normal appearing. No distress.  HEENT: Normocephalic. Eyes conjunctiva clear lids without ptosis, pupils equal and reactive to light accommodation, ears normal shape and contour, canals are clear bilaterally, tympanic membranes are benign, nasal mucosa benign, oropharynx is without erythema, edema or exudates.   Neck: Supple without JVD or bruit. Thyroid is not enlarged.  Pulmonary: Clear to ausculation.  Normal effort. No rales, ronchi, or wheezing.  Cardiovascular: Regular rate and rhythm without murmur. Carotid and radial pulses are intact and equal bilaterally.  Abdomen: Soft and nondistended. Normal bowel sounds. Liver and spleen " are not palpable. + for RLQ and LLQ tender to palpation. Negative for erythema/eccymosis/ trauma. Negative RUQ and LUQ tenderness. Positive rebound tenderness. Patient can not tolerate deep palpation of RLQ.  Neurologic: Grossly nonfocal.  Cranial nerves are normal. DTR's normal and symmetric.  Lymph: No cervical, supraclavicular or axillary lymph nodes are palpable  Skin: Warm and dry.  No rashes or suspicious skin lesions.  Musculoskeletal: Normal gait. No extremity cyanosis, clubbing, or edema.  Psych: Normal mood and affect. Alert and oriented x3. Judgment and insight is normal.    Medical decision-making and discussion:  1. Other constipation  CT results indicated constipation.  Advised patient to continue Miralax and Colace stool softener. Advised patient that he can use up to 4 capfuls of Miralax per day. Advised patient to adjust Miralax's frequency and quantity to fit desired BM's. Suggested Prune Juice and Milk of Magnesia. Advised patient to mix prune juice and milk of magnesia and drink it. Advised patient to drink plenty of water and increase fiber intake. Work on exercise. Avoid foods that may cause constipation (white bread, bagels, white rice).  Advised patient to seek immediate emergency care if he develops fever, chills, nausea, vomiting,  Increased abdominal pain.    Follow-up for worsening symptoms, lack of expected recovery, or should new symptoms or problems arise.        Please note that this dictation was created using voice recognition software. I have made every reasonable attempt to correct obvious errors, but I expect that there are errors of grammar and possibly content that I did not discover before finalizing the note.        Return if symptoms worsen or fail to improve.

## 2018-03-07 ENCOUNTER — OFFICE VISIT (OUTPATIENT)
Dept: CARDIOLOGY | Facility: MEDICAL CENTER | Age: 73
End: 2018-03-07
Payer: MEDICARE

## 2018-03-07 VITALS
DIASTOLIC BLOOD PRESSURE: 80 MMHG | WEIGHT: 127 LBS | BODY MASS INDEX: 19.25 KG/M2 | HEART RATE: 78 BPM | OXYGEN SATURATION: 97 % | SYSTOLIC BLOOD PRESSURE: 120 MMHG | HEIGHT: 68 IN

## 2018-03-07 DIAGNOSIS — I35.0 AORTIC STENOSIS, MODERATE: ICD-10-CM

## 2018-03-07 DIAGNOSIS — J41.1 MUCOPURULENT CHRONIC BRONCHITIS (HCC): ICD-10-CM

## 2018-03-07 DIAGNOSIS — R55 SYNCOPE AND COLLAPSE: ICD-10-CM

## 2018-03-07 PROCEDURE — 99214 OFFICE O/P EST MOD 30 MIN: CPT | Performed by: INTERNAL MEDICINE

## 2018-03-07 NOTE — PROGRESS NOTES
Arrhythmia Clinic Note (Established Patient)     DOS: 3/7/2018    Referring physician: Christopher Mantilla    Chief complaint/Reason for consult: Syncope    HPI:  Patient is a 71 yo gentleman with unexplained syncope here for follow-up. He has not had recurrent syncope last I saw him. He really wants to talk about his issues with constipation today. W/u including echo and carotid dopplers have been unremarkable.    ROS (+ highlighted in red):  Constitutional: Fevers/chills/fatigue/weightloss  HEENT: Blurry vision/eye pain/sore throat/hearing loss  Respiratory: Shortness of breath/cough  Cardiovascular: Chest pain/palpitations/edema/orthopnea/syncope  GI: Nausea/vomitting/diarrhea/constipation  MSK: Arthralgias/myagias/muscle weakness  Skin: Rash/sores  Neurological: Numbness/tremors/vertigo  Endocrine: Excessive thirst/polyuria/cold intolerance/heat intolerance  Psych: Depression/anxiety    Past Medical History:   Diagnosis Date   • COPD (chronic obstructive pulmonary disease) (CMS-[facility])        Past Surgical History:   Procedure Laterality Date   • ARTHROSCOPY, KNEE  1978    meniscus, right   • HERNIA REPAIR  child   • TONSILLECTOMY  child       Social History     Social History   • Marital status:      Spouse name: N/A   • Number of children: N/A   • Years of education: N/A     Occupational History   • Not on file.     Social History Main Topics   • Smoking status: Former Smoker     Packs/day: 0.25     Years: 20.00     Types: Cigarettes     Quit date: 7/7/2017   • Smokeless tobacco: Never Used   • Alcohol use 0.0 oz/week      Comment: occ   • Drug use: No   • Sexual activity: Yes     Partners: Female      Comment: single retired      Other Topics Concern   • Not on file     Social History Narrative   • No narrative on file       Family History   Problem Relation Age of Onset   • Cancer Neg Hx    • Diabetes Neg Hx    • Heart Disease Neg Hx    • Stroke Neg Hx    • Heart Attack Neg Hx        No Known  "Allergies    Current Outpatient Prescriptions   Medication Sig Dispense Refill   • BREO ELLIPTA 100-25 MCG/INH AEROSOL POWDER, BREATH ACTIVATED INHALE 1 PUFF BY MOUTH EVERY DAY. 1 Each 3   • CHANTIX 0.5 MG tablet TAKE 1 TABLET BY MOUTH ONCE DAILY FOR 3 DAYS THEN 1 TAB TWICE DAILY FOR 4 DAYS THEN 2 TABS ONCE KHALIDA 56 Tab 0   • Multiple Vitamins-Minerals (CENTRUM SILVER ADULT 50+ PO) Take 1 tablet by mouth every day.     • COMBIVENT RESPIMAT  MCG/ACT Aero Soln INHALE 2 PUFFS BY MOUTH EVERY 6 HOURS AS NEEDED 1 Inhaler 3   • albuterol (VENTOLIN HFA) 108 (90 BASE) MCG/ACT Aero Soln inhalation aerosol USE 2 PUFFS BY INHALATIONS EVERY 6 HOURS AS NEEDED FOR SHORTNESS OF BREATH 3 Inhaler 3   • SPIRIVA HANDIHALER 18 MCG Cap INHALE 1 CAP BY MOUTH EVERY DAY. AT THE SAME TIME EVERY DAY 90 Cap 3   • ipratropium-albuterol (DUONEB) 0.5-2.5 (3) MG/3ML nebulizer solution USE 1 VIAL INTO NEBULIZER 2 TIMES DAILY AS NEEDED FOR SHORTNESS OF BREATH 180 mL 11   • aspirin EC 81 MG EC tablet Take 1 Tab by mouth every day. 100 Tab 0   • omeprazole (PRILOSEC) 20 MG delayed-release capsule Take 1 Cap by mouth every day. (Patient not taking: Reported on 1/29/2018) 90 Cap 1   • Omega-3 Fatty Acids (FISH OIL) 1200 MG Cap Take 1,200 mg by mouth every day.     • Ascorbic Acid (VITAMIN C) 1000 MG Tab Take 1,000 mg by mouth every day.     • Calcium 600 MG Tab Take 600 mg by mouth every day.       No current facility-administered medications for this visit.        Physical Exam:  Vitals:    03/07/18 1041   BP: 120/80   Pulse: 78   SpO2: 97%   Weight: 57.6 kg (127 lb)   Height: 1.727 m (5' 8\")     General appearance: NAD, conversant   Eyes: anicteric sclerae, moist conjunctivae; no lid-lag; PERRLA  HENT: Atraumatic; oropharynx clear with moist mucous membranes and no mucosal ulcerations; normal hard and soft palate  Neck: Trachea midline; FROM, supple, no thyromegaly or lymphadenopathy  Lungs: CTA, with normal respiratory effort and no intercostal " retractions  CV: RRR, no MRGs, no JVD   Abdomen: Soft, non-tender; no masses or HSM  Extremities: No peripheral edema or extremity lymphadenopathy  Skin: Normal temperature, turgor and texture; no rash, ulcers or subcutaneous nodules  Psych: Appropriate affect, alert and oriented to person, place and time    Data:  Recent ED visit records reviewed    Prior echo/stress results reviewed:  Mild AS    Carotid US reviewed    Impression/Plan:  1. Syncope and collapse  ZIO PATCH MONITOR   2. Mucopurulent chronic bronchitis (CMS-HCC)     3. Aortic stenosis, moderate       -His AS has not progressed  -His carotid US was unremarkable with mild disease bilaterally  -I still do not have an etiology for his syncope  -Unfortunately he is fixated on talking about his constipation today  -I will get a zio to r/o paroxysmal arrhythmias    Zach Jiménez MD

## 2018-03-12 ENCOUNTER — TELEPHONE (OUTPATIENT)
Dept: CARDIOLOGY | Facility: MEDICAL CENTER | Age: 73
End: 2018-03-12

## 2018-03-12 ENCOUNTER — NON-PROVIDER VISIT (OUTPATIENT)
Dept: CARDIOLOGY | Facility: MEDICAL CENTER | Age: 73
End: 2018-03-12
Attending: INTERNAL MEDICINE
Payer: MEDICAID

## 2018-03-12 DIAGNOSIS — R55 SYNCOPE AND COLLAPSE: ICD-10-CM

## 2018-03-25 DIAGNOSIS — F17.200 TOBACCO USE DISORDER: ICD-10-CM

## 2018-03-27 RX ORDER — VARENICLINE TARTRATE 0.5 MG/1
TABLET, FILM COATED ORAL
Qty: 56 TAB | Refills: 0 | Status: SHIPPED | OUTPATIENT
Start: 2018-03-27 | End: 2019-10-11

## 2018-04-02 ENCOUNTER — TELEPHONE (OUTPATIENT)
Dept: CARDIOLOGY | Facility: MEDICAL CENTER | Age: 73
End: 2018-04-02

## 2018-04-02 PROCEDURE — 0298T PR EXT ECG > 48HR TO 21 DAY REVIEW AND INTERPRETATN: CPT | Performed by: INTERNAL MEDICINE

## 2018-04-02 PROCEDURE — 0296T PR EXT ECG > 48HR TO 21 DAY RCRD W/CONECT INTL RCRD: CPT | Performed by: INTERNAL MEDICINE

## 2018-04-02 NOTE — TELEPHONE ENCOUNTER
Dr. Jiménez reviewed Ziopatch report. Per Dr. Jiménez: showed some non-sustained atrial arrhythmias. No significant causes foe syncope. No abnormalities.  Called pt. To advise.

## 2018-07-17 DIAGNOSIS — J42 CHRONIC BRONCHITIS, UNSPECIFIED CHRONIC BRONCHITIS TYPE (HCC): ICD-10-CM

## 2018-07-19 ENCOUNTER — TELEPHONE (OUTPATIENT)
Dept: MEDICAL GROUP | Facility: PHYSICIAN GROUP | Age: 73
End: 2018-07-19

## 2018-07-19 DIAGNOSIS — J42 CHRONIC BRONCHITIS, UNSPECIFIED CHRONIC BRONCHITIS TYPE (HCC): ICD-10-CM

## 2018-07-19 NOTE — TELEPHONE ENCOUNTER
[place] pharmacy called asking where Breo Ellipta was sent to as they can not fill it as another pharmacy tried to fill on 7/17/18.  Informed it was sent to [facility] and patient should let us know which pharmacy he is using now as we have 4 pharmacies on file and this may cause delays like this for refills.

## 2019-03-01 DIAGNOSIS — J42 CHRONIC BRONCHITIS, UNSPECIFIED CHRONIC BRONCHITIS TYPE (HCC): ICD-10-CM

## 2019-04-23 ENCOUNTER — PATIENT OUTREACH (OUTPATIENT)
Dept: HEALTH INFORMATION MANAGEMENT | Facility: OTHER | Age: 74
End: 2019-04-23

## 2019-04-23 NOTE — PROGRESS NOTES
Outcome: [place] not call; [place]     Please transfer to Patient Outreach Team at [phone] when patient returns call.    WebIZ Checked & Epic Updated:  no    [facility] Verified: yes    Attempt # 1.

## 2019-08-12 NOTE — TELEPHONE ENCOUNTER
----- Message from Christopher Mantilla M.D. sent at 2/11/2018 10:18 PM PST -----  Please let pt know that echocardiogram is unremarkably. There is one valve that is calcified, but still it is functioning well. Echocardiogram cannot explain why he is passing out. Also carotid US are mild narrowing, no significant stenosis.   Please advise him to follow up with cardiology for further eval. Please let me know if he has questions  Thank you,  Christopher Mantilla M.D.    
Spoke with patient and let them know Christopher Mantilla M.D.'s message.    
None

## 2019-09-27 ENCOUNTER — TELEPHONE (OUTPATIENT)
Dept: SCHEDULING | Facility: IMAGING CENTER | Age: 74
End: 2019-09-27

## 2019-10-11 ENCOUNTER — OFFICE VISIT (OUTPATIENT)
Dept: MEDICAL GROUP | Facility: MEDICAL CENTER | Age: 74
End: 2019-10-11
Payer: MEDICARE

## 2019-10-11 VITALS
SYSTOLIC BLOOD PRESSURE: 138 MMHG | OXYGEN SATURATION: 94 % | RESPIRATION RATE: 14 BRPM | TEMPERATURE: 98.7 F | BODY MASS INDEX: 20.43 KG/M2 | DIASTOLIC BLOOD PRESSURE: 68 MMHG | HEART RATE: 86 BPM | WEIGHT: 134.8 LBS | HEIGHT: 68 IN

## 2019-10-11 DIAGNOSIS — Z72.0 TOBACCO ABUSE: ICD-10-CM

## 2019-10-11 DIAGNOSIS — Z13.6 SCREENING FOR CARDIOVASCULAR CONDITION: ICD-10-CM

## 2019-10-11 DIAGNOSIS — L60.8 TOENAIL DEFORMITY: ICD-10-CM

## 2019-10-11 DIAGNOSIS — Z23 NEED FOR VACCINATION: ICD-10-CM

## 2019-10-11 DIAGNOSIS — Z00.00 WELLNESS EXAMINATION: ICD-10-CM

## 2019-10-11 DIAGNOSIS — J42 CHRONIC BRONCHITIS, UNSPECIFIED CHRONIC BRONCHITIS TYPE (HCC): ICD-10-CM

## 2019-10-11 DIAGNOSIS — Z12.5 SCREENING PSA (PROSTATE SPECIFIC ANTIGEN): ICD-10-CM

## 2019-10-11 DIAGNOSIS — J41.1 MUCOPURULENT CHRONIC BRONCHITIS (HCC): ICD-10-CM

## 2019-10-11 PROBLEM — M54.50 ACUTE MIDLINE LOW BACK PAIN WITHOUT SCIATICA: Status: RESOLVED | Noted: 2018-01-02 | Resolved: 2019-10-11

## 2019-10-11 PROBLEM — R07.9 CHEST PAIN: Status: RESOLVED | Noted: 2018-01-17 | Resolved: 2019-10-11

## 2019-10-11 PROBLEM — K59.09 OTHER CONSTIPATION: Status: RESOLVED | Noted: 2018-02-22 | Resolved: 2019-10-11

## 2019-10-11 PROCEDURE — 99214 OFFICE O/P EST MOD 30 MIN: CPT | Performed by: PHYSICIAN ASSISTANT

## 2019-10-11 ASSESSMENT — PATIENT HEALTH QUESTIONNAIRE - PHQ9: CLINICAL INTERPRETATION OF PHQ2 SCORE: 0

## 2019-10-14 PROBLEM — L60.8 TOENAIL DEFORMITY: Status: ACTIVE | Noted: 2019-10-14

## 2019-10-14 NOTE — PROGRESS NOTES
Subjective:   Remi Matt is a 73 y.o. male here today for COPD follow up, smoking cessation, toenail deformity. Previously seen by Dr. Mantilla, establishing with Dr. Orosco in January.    Tobacco abuse  Has used chantix successfully in the past. Would like to try again.    COPD (chronic obstructive pulmonary disease)  Chronic, patient denies recent infection or worsening of symptoms, only using the breo and combivent currently. Currently smoking although has successfully quit in the past with chantix and would like a new prescription.    Toenail deformity  Big toe with thickened and deformed nail, cutting in to the 2nd toe, would like to see podiatrist for removal       Current medicines (including changes today)  Current Outpatient Medications   Medication Sig Dispense Refill   • ipratropium-albuterol (COMBIVENT RESPIMAT)  MCG/ACT Aero Soln Inhale 2 Puffs by mouth every 6 hours as needed. 1 Inhaler 11   • varenicline (CHANTIX SANDRA) 0.5 MG X 11 & 1 MG X 42 tablet Use SANDRA as directed 56 Tab 0   • Fluticasone Furoate-Vilanterol (BREO ELLIPTA) 100-25 MCG/INH AEROSOL POWDER, BREATH ACTIVATED Inhale 1 Puff by mouth every day. 60 Each 5   • omeprazole (PRILOSEC) 20 MG delayed-release capsule Take 1 Cap by mouth every day. (Patient not taking: Reported on 1/29/2018) 90 Cap 1   • Omega-3 Fatty Acids (FISH OIL) 1200 MG Cap Take 1,200 mg by mouth every day.     • Multiple Vitamins-Minerals (CENTRUM SILVER ADULT 50+ PO) Take 1 tablet by mouth every day.     • Ascorbic Acid (VITAMIN C) 1000 MG Tab Take 1,000 mg by mouth every day.     • Calcium 600 MG Tab Take 600 mg by mouth every day.     • albuterol (VENTOLIN HFA) 108 (90 BASE) MCG/ACT Aero Soln inhalation aerosol USE 2 PUFFS BY INHALATIONS EVERY 6 HOURS AS NEEDED FOR SHORTNESS OF BREATH 3 Inhaler 3   • ipratropium-albuterol (DUONEB) 0.5-2.5 (3) MG/3ML nebulizer solution USE 1 VIAL INTO NEBULIZER 2 TIMES DAILY AS NEEDED FOR SHORTNESS OF BREATH 180 mL 11   •  "aspirin EC 81 MG EC tablet Take 1 Tab by mouth every day. 100 Tab 0     No current facility-administered medications for this visit.      He  has a past medical history of Asthma and COPD (chronic obstructive pulmonary disease) ([facility]).    ROS   No fever/chills. No weight change. No headache/dizziness. No focal weakness. No sore throat, nasal congestion, ear pain. No chest pain, no shortness of breath, difficulty breathing. No n/v/d/c or abdominal pain. No urinary complaint. No rash or skin lesion. No joint pain or swelling.       Objective:     /68 (BP Location: Right arm, Patient Position: Sitting, BP Cuff Size: Adult long)   Pulse 86   Temp 37.1 °C (98.7 °F) (Temporal)   Resp 14   Ht 1.727 m (5' 8\")   Wt 61.1 kg (134 lb 12.8 oz)   SpO2 94%  Body mass index is 20.5 kg/m².   Physical Exam:  Constitutional: WDWN, NAD - thin, chronically ill appearing  Skin: Warm, dry, good turgor, no rashes in visible areas.  Respiratory: reduced effort, no audible wheezes  Cardiovascular: Normal S1, S2,with loud systolic murmur consistent with aortic stenosis, no leg swelling  Psych: Alert and oriented x3, normal affect and mood.        Assessment and Plan:   The following treatment plan was discussed    1. Mucopurulent chronic bronchitis (HCC)    - ipratropium-albuterol (COMBIVENT RESPIMAT)  MCG/ACT Aero Soln; Inhale 2 Puffs by mouth every 6 hours as needed.  Dispense: 1 Inhaler; Refill: 11    2. Tobacco abuse    - varenicline (CHANTIX SANDRA) 0.5 MG X 11 & 1 MG X 42 tablet; Use SANDRA as directed  Dispense: 56 Tab; Refill: 0    3. Need for vaccination  We are out of high dose flu, patient will go to pharmacy today and have it there    4. Wellness examination    - CBC WITH DIFFERENTIAL; Future  - Comp Metabolic Panel; Future    5. Screening for cardiovascular condition    - Lipid Profile; Future    6. Screening PSA (prostate specific antigen)    - PROSTATE SPECIFIC AG SCREENING; Future    7. Toenail deformity    - " REFERRAL TO PODIATRY    8. Chronic bronchitis, unspecified chronic bronchitis type (HCC)    - Fluticasone Furoate-Vilanterol (BREO ELLIPTA) 100-25 MCG/INH AEROSOL POWDER, BREATH ACTIVATED; Inhale 1 Puff by mouth every day.  Dispense: 60 Each; Refill: 5      Followup: as needed and with Dr. Orosco

## 2019-10-14 NOTE — ASSESSMENT & PLAN NOTE
Chronic, patient denies recent infection or worsening of symptoms, only using the breo and combivent currently. Currently smoking although has successfully quit in the past with chantix and would like a new prescription.

## 2019-10-14 NOTE — ASSESSMENT & PLAN NOTE
Big toe with thickened and deformed nail, cutting in to the 2nd toe, would like to see podiatrist for removal

## 2019-10-28 ENCOUNTER — HOSPITAL ENCOUNTER (OUTPATIENT)
Dept: LAB | Facility: MEDICAL CENTER | Age: 74
End: 2019-10-28
Attending: PHYSICIAN ASSISTANT
Payer: MEDICARE

## 2019-10-28 DIAGNOSIS — Z12.5 SCREENING PSA (PROSTATE SPECIFIC ANTIGEN): ICD-10-CM

## 2019-10-28 DIAGNOSIS — Z13.6 SCREENING FOR CARDIOVASCULAR CONDITION: ICD-10-CM

## 2019-10-28 DIAGNOSIS — Z00.00 WELLNESS EXAMINATION: ICD-10-CM

## 2019-10-28 LAB
ALBUMIN SERPL BCP-MCNC: 4.5 G/DL (ref 3.2–4.9)
ALBUMIN/GLOB SERPL: 1.5 G/DL
ALP SERPL-CCNC: 65 U/L (ref 30–99)
ALT SERPL-CCNC: 15 U/L (ref 2–50)
ANION GAP SERPL CALC-SCNC: 9 MMOL/L (ref 0–11.9)
AST SERPL-CCNC: 21 U/L (ref 12–45)
BASOPHILS # BLD AUTO: 0.7 % (ref 0–1.8)
BASOPHILS # BLD: 0.06 K/UL (ref 0–0.12)
BILIRUB SERPL-MCNC: 0.9 MG/DL (ref 0.1–1.5)
BUN SERPL-MCNC: 13 MG/DL (ref 8–22)
CALCIUM SERPL-MCNC: 9.7 MG/DL (ref 8.5–10.5)
CHLORIDE SERPL-SCNC: 105 MMOL/L (ref 96–112)
CHOLEST SERPL-MCNC: 171 MG/DL (ref 100–199)
CO2 SERPL-SCNC: 26 MMOL/L (ref 20–33)
CREAT SERPL-MCNC: 0.79 MG/DL (ref 0.5–1.4)
EOSINOPHIL # BLD AUTO: 0.1 K/UL (ref 0–0.51)
EOSINOPHIL NFR BLD: 1.1 % (ref 0–6.9)
ERYTHROCYTE [DISTWIDTH] IN BLOOD BY AUTOMATED COUNT: 47.9 FL (ref 35.9–50)
FASTING STATUS PATIENT QL REPORTED: NORMAL
GLOBULIN SER CALC-MCNC: 3 G/DL (ref 1.9–3.5)
GLUCOSE SERPL-MCNC: 76 MG/DL (ref 65–99)
HCT VFR BLD AUTO: 49.8 % (ref 42–52)
HDLC SERPL-MCNC: 43 MG/DL
HGB BLD-MCNC: 16.1 G/DL (ref 14–18)
IMM GRANULOCYTES # BLD AUTO: 0.04 K/UL (ref 0–0.11)
IMM GRANULOCYTES NFR BLD AUTO: 0.4 % (ref 0–0.9)
LDLC SERPL CALC-MCNC: 117 MG/DL
LYMPHOCYTES # BLD AUTO: 0.99 K/UL (ref 1–4.8)
LYMPHOCYTES NFR BLD: 10.8 % (ref 22–41)
MCH RBC QN AUTO: 30.9 PG (ref 27–33)
MCHC RBC AUTO-ENTMCNC: 32.3 G/DL (ref 33.7–35.3)
MCV RBC AUTO: 95.6 FL (ref 81.4–97.8)
MONOCYTES # BLD AUTO: 0.65 K/UL (ref 0–0.85)
MONOCYTES NFR BLD AUTO: 7.1 % (ref 0–13.4)
NEUTROPHILS # BLD AUTO: 7.34 K/UL (ref 1.82–7.42)
NEUTROPHILS NFR BLD: 79.9 % (ref 44–72)
NRBC # BLD AUTO: 0 K/UL
NRBC BLD-RTO: 0 /100 WBC
PLATELET # BLD AUTO: 349 K/UL (ref 164–446)
PMV BLD AUTO: 9.2 FL (ref 9–12.9)
POTASSIUM SERPL-SCNC: 4.1 MMOL/L (ref 3.6–5.5)
PROT SERPL-MCNC: 7.5 G/DL (ref 6–8.2)
RBC # BLD AUTO: 5.21 M/UL (ref 4.7–6.1)
SODIUM SERPL-SCNC: 140 MMOL/L (ref 135–145)
TRIGL SERPL-MCNC: 55 MG/DL (ref 0–149)
WBC # BLD AUTO: 9.2 K/UL (ref 4.8–10.8)

## 2019-10-28 PROCEDURE — 84153 ASSAY OF PSA TOTAL: CPT

## 2019-10-28 PROCEDURE — 36415 COLL VENOUS BLD VENIPUNCTURE: CPT

## 2019-10-28 PROCEDURE — 85025 COMPLETE CBC W/AUTO DIFF WBC: CPT

## 2019-10-28 PROCEDURE — 80053 COMPREHEN METABOLIC PANEL: CPT

## 2019-10-28 PROCEDURE — 80061 LIPID PANEL: CPT

## 2019-10-29 LAB — PSA SERPL-MCNC: 3.23 NG/ML (ref 0–4)

## 2020-01-22 ENCOUNTER — TELEPHONE (OUTPATIENT)
Dept: MEDICAL GROUP | Facility: MEDICAL CENTER | Age: 75
End: 2020-01-22

## 2020-01-23 ENCOUNTER — OFFICE VISIT (OUTPATIENT)
Dept: MEDICAL GROUP | Facility: MEDICAL CENTER | Age: 75
End: 2020-01-23
Payer: MEDICARE

## 2020-01-23 VITALS
RESPIRATION RATE: 18 BRPM | DIASTOLIC BLOOD PRESSURE: 78 MMHG | TEMPERATURE: 97.5 F | HEIGHT: 68 IN | OXYGEN SATURATION: 95 % | BODY MASS INDEX: 20.82 KG/M2 | SYSTOLIC BLOOD PRESSURE: 132 MMHG | WEIGHT: 137.35 LBS | HEART RATE: 76 BPM

## 2020-01-23 DIAGNOSIS — J42 CHRONIC BRONCHITIS, UNSPECIFIED CHRONIC BRONCHITIS TYPE (HCC): ICD-10-CM

## 2020-01-23 DIAGNOSIS — R55 SYNCOPE, UNSPECIFIED SYNCOPE TYPE: ICD-10-CM

## 2020-01-23 DIAGNOSIS — R97.20 ELEVATED PSA: ICD-10-CM

## 2020-01-23 DIAGNOSIS — Z72.0 TOBACCO ABUSE: ICD-10-CM

## 2020-01-23 DIAGNOSIS — I35.0 AORTIC STENOSIS, MODERATE: ICD-10-CM

## 2020-01-23 DIAGNOSIS — I65.23 BILATERAL CAROTID ARTERY STENOSIS: ICD-10-CM

## 2020-01-23 PROBLEM — D64.9 NORMOCYTIC ANEMIA: Status: RESOLVED | Noted: 2018-01-17 | Resolved: 2020-01-23

## 2020-01-23 PROCEDURE — 8041 PR SCP AHA: Performed by: FAMILY MEDICINE

## 2020-01-23 PROCEDURE — 99214 OFFICE O/P EST MOD 30 MIN: CPT | Mod: 25 | Performed by: FAMILY MEDICINE

## 2020-01-23 ASSESSMENT — PATIENT HEALTH QUESTIONNAIRE - PHQ9: CLINICAL INTERPRETATION OF PHQ2 SCORE: 0

## 2020-01-23 NOTE — TELEPHONE ENCOUNTER
ESTABLISHED PATIENT PRE-VISIT PLANNING     Patient was NOT contacted to complete PVP.    1.  Reviewed notes from the last few office visits within the medical group: Yes    2.  If any orders were placed at last visit or intended to be done for this visit (i.e. 6 mos follow-up), do we have Results/Consult Notes?        •  Labs - Labs ordered, completed on 10/28/19 and results are in chart.       •  Imaging - Imaging was not ordered at last office visit.       •  Referrals - Referral ordered, patient has NOT been seen.    3. Is this appointment scheduled as a Hospital Follow-Up? No    4.  Immunizations were updated in [employer] using WebIZ?: Yes       •  Web Iz Recommendations: FLU, TDAP, VARICELLA (Chicken Pox)  and SHINGRIX (Shingles)    5.  Patient is due for the following Health Maintenance Topics:   Health Maintenance Due   Topic Date Due   • Annual Wellness Visit  1945   • IMM DTaP/Tdap/Td Vaccine (1 - Tdap) 10/16/1956   • IMM ZOSTER VACCINES (1 of 2) 10/16/1994   • COLON CANCER SCREENING ANNUAL FIT  03/22/2017   • ANNUAL PFT SCREENING-FEV1 AND FEV/FVC RATIO / SPIROMETRY  06/13/2017   • IMM PNEUMOCOCCAL VACCINE: 65+ Years (2 of 2 - PCV13) 06/16/2018   • IMM INFLUENZA (1) 09/01/2019       6. Orders for overdue Health Maintenance topics pended in Pre-Charting? NO    7.  AHA (MDX) form printed for Provider? YES    8.  Patient was NOT informed to arrive 15 min prior to their scheduled appointment and bring in their medication bottles.

## 2020-01-23 NOTE — PROGRESS NOTES
Annual Health Assessment Questions:    1.  Are you currently engaging in any exercise or physical activity? No    2.  How would you describe your mood or emotional well-being today? good    3.  Have you had any falls in the last year? No    4.  Have you noticed any problems with your balance or had difficulty walking? Yes, knee     5.  In the last six months have you experienced any leakage of urine? Yes    6. DPA/Advanced Directive: Patient does not have an Advanced Directive.  A packet and workshop information was given on Advanced Directives.

## 2020-01-23 NOTE — ASSESSMENT & PLAN NOTE
Chronic problem. Currently taking breo and combivent. Breathing is doing ok. Still smoking2 cigarettes per day.   Denies samira SOB. He does not see a pulmonologist. Never been referred to a pulmonologist or had PFTs. He declines referral today.

## 2020-01-24 NOTE — ASSESSMENT & PLAN NOTE
Chronic problem.  The patient states that he still smokes 2 cigarettes/day.  He would very much like to quit.  He tried Chantix with no improvement the second go around.

## 2020-01-24 NOTE — ASSESSMENT & PLAN NOTE
Chronic problem.  Most recent echo done in February 2018 showed no change in his aortic stenosis.  He denies any symptoms of lightheadedness, chest pain or shortness of breath.

## 2020-01-24 NOTE — ASSESSMENT & PLAN NOTE
The patient had 2 episodes of syncope 2 years ago.  He was evaluated by cardiology with no known cause.  Cardiology looks like recommended a Ziehl patch, there is unclear if the patient ever got this done.  He does not wish to follow-up with cardiology.

## 2020-01-24 NOTE — PROGRESS NOTES
Subjective:     CC: The encounter diagnosis was Mucopurulent chronic bronchitis (HCC).    HPI: Patient is a 74 y.o. male established patient who presents today to [facility] care.  The patient was seen by Dr. Mantilla previously.  The patient has a past medical history of COPD, tobacco abuse, aortic stenosis and bilateral carotid artery stenosis.      COPD (chronic obstructive pulmonary disease)  Chronic problem. Currently taking breo and combivent. Breathing is doing ok. Still smoking2 cigarettes per day.   Denies any SOB. He does not see a pulmonologist. Never been referred to a pulmonologist or had PFTs. He declines referral today.     Tobacco abuse  Chronic problem.  The patient states that he still smokes 2 cigarettes/day.  He would very much like to quit.  He tried Chantix with no improvement the second go around.    Elevated PSA  PSA slightly elevated in 2014 and 2016.  Most recent PSA done 10/28/2019 was normal at 3.23.    Syncope  The patient had 2 episodes of syncope 2 years ago.  He was evaluated by cardiology with no known cause.  Cardiology looks like recommended a Ziehl patch, there is unclear if the patient ever got this done.  He does not wish to follow-up with cardiology.      Aortic stenosis, moderate  Chronic problem.  Most recent echo done in February 2018 showed no change in his aortic stenosis.  He denies any symptoms of lightheadedness, chest pain or shortness of breath.      Bilateral carotid artery stenosis mild  Chronic problem.  The last ultrasound done February 2018 showed mild stenosis less than 50%.    Past Medical History:   Diagnosis Date   • Asthma    • COPD (chronic obstructive pulmonary disease) (HCC)        Social History     Tobacco Use   • Smoking status: Current Every Day Smoker     Packs/day: 0.25     Years: 20.00     Pack years: 5.00     Types: Cigarettes   • Smokeless tobacco: Never Used   • Tobacco comment: smoked for 10+ years, quit for about 5, started smoking again about 7-8  "years ago   Substance Use Topics   • Alcohol use: Yes     Alcohol/week: 0.0 oz     Comment: occ   • Drug use: No       Current Outpatient Medications Ordered in Epic   Medication Sig Dispense Refill   • ipratropium-albuterol (COMBIVENT RESPIMAT)  MCG/ACT Aero Soln Inhale 2 Puffs by mouth every 6 hours as needed. 1 Inhaler 11   • Fluticasone Furoate-Vilanterol (BREO ELLIPTA) 100-25 MCG/INH AEROSOL POWDER, BREATH ACTIVATED Inhale 1 Puff by mouth every day. 60 Each 5   • ipratropium-albuterol (DUONEB) 0.5-2.5 (3) MG/3ML nebulizer solution USE 1 VIAL INTO NEBULIZER 2 TIMES DAILY AS NEEDED FOR SHORTNESS OF BREATH 180 mL 11   • varenicline (CHANTIX SANDRA) 0.5 MG X 11 & 1 MG X 42 tablet Use SANDRA as directed 56 Tab 0   • Omega-3 Fatty Acids (FISH OIL) 1200 MG Cap Take 1,200 mg by mouth every day.     • Multiple Vitamins-Minerals (CENTRUM SILVER ADULT 50+ PO) Take 1 tablet by mouth every day.     • Ascorbic Acid (VITAMIN C) 1000 MG Tab Take 1,000 mg by mouth every day.     • Calcium 600 MG Tab Take 600 mg by mouth every day.       No current [facility]-ordered facility-administered medications on file.        Allergies:  Patient has no known allergies.    Health Maintenance: Completed    ROS:    Pulm: no sob, no cough  CV: no chest pain, no palpitations      Objective:       Exam:  /78 (BP Location: Left arm, Patient Position: Sitting, BP Cuff Size: Adult)   Pulse 76   Temp 36.4 °C (97.5 °F) (Temporal)   Resp 18   Ht 1.727 m (5' 8\")   Wt 62.3 kg (137 lb 5.6 oz)   SpO2 95%   BMI 20.88 kg/m²  Body mass index is 20.88 kg/m².    General: Normal appearing. No distress.  HEAD: NCAT  EYES: conjunctiva clear, lids without ptosis, pupils equal and reactive to light  EARS: ears normal shape and contour.  MOUTH: normal dentition   Neck:  Normal ROM  Pulmonary: Clear to auscultation bilaterally, no wheezes rales or rhonchi.  Normal effort. Normal respiratory rate.  Cardiovascular: Regular rate and rhythm, systolic murmur,  " rubs or gallops.  Well perfused. No LE edema.  Extremities warm to the touch.  Neurologic: Grossly normal, no focal deficits  Skin: Warm and dry.  No obvious lesions.  Musculoskeletal: Normal gait and station.   Psych: Normal mood and affect. Alert and oriented x3. Judgment and insight is normal.      Labs: 10/28/2019 results reviewed and discussed with the patient, questions answered.    Assessment & Plan:     74 y.o. male with the following -     1. Chronic bronchitis, unspecified chronic bronchitis type (HCC)  Chronic problem.  Generally well controlled on his Breo and Combivent.  Denies any shortness of breath or chronic cough.  He declines a referral to pulmonology for pulmonary function testing.    2. Tobacco abuse  Chronic problem.  The patient is down to 2 cigarettes/day.  No improvement with Chantix.    3. Elevated PSA  History of elevated PSA, recent PSA test was normal in October 2019.    4. Syncope, unspecified syncope type  Chronic problem, now resolved.  Etiology remains unclear.  The patient was evaluated by cardiology who recommended a Ziehl patch.  It is unclear if the patient ever got this done it is unlikely that he did.  He does not desire to see cardiology any longer.  He has had no symptoms in the last 2 years.    5. Aortic stenosis, moderate  Chronic problem.  Last echocardiogram was done 2 years ago and showed stable stenosis.    6. Bilateral carotid artery stenosis mild  Chronic problem.  Last ultrasound done around 2 years ago showed mild stenosis, less than 50%.      Return in about 6 months (around 7/23/2020).    Please note that this dictation was created using voice recognition software. I have made every reasonable attempt to correct obvious errors, but I expect that there are errors of grammar and possibly content that I did not discover before finalizing the note.

## 2020-01-24 NOTE — ASSESSMENT & PLAN NOTE
Patient has history of slightly elevated PSA in 2016.  His most recent PSA was within normal limits.

## 2020-07-17 ENCOUNTER — TELEPHONE (OUTPATIENT)
Dept: MEDICAL GROUP | Facility: PHYSICIAN GROUP | Age: 75
End: 2020-07-17

## 2020-07-17 NOTE — TELEPHONE ENCOUNTER
ESTABLISHED PATIENT PRE-VISIT PLANNING     Patient was NOT contacted to complete PVP.     Note: Patient will not be contacted if there is no indication to call.     1.  Reviewed notes from the last few office visits within the medical group: Yes    2.  If any orders were placed at last visit or intended to be done for this visit (i.e. 6 mos follow-up), do we have Results/Consult Notes?        •  Labs - Labs were not ordered at last office visit.   Note: If patient appointment is for lab review and patient did not complete labs, check with provider if OK to reschedule patient until labs completed.       •  Imaging - Imaging was not ordered at last office visit.       •  Referrals - No referrals were ordered at last office visit.    3. Is this appointment scheduled as a Hospital Follow-Up? No    4.  Immunizations were updated in Epic using WebIZ?: Epic matches WebIZ       •  Web Iz Recommendations: PNEUMOVAX (PPSV23), TDAP and SHINGRIX (Shingles)    5.  Patient is due for the following Health Maintenance Topics:   Health Maintenance Due   Topic Date Due   • Annual Wellness Visit  1945   • IMM DTaP/Tdap/Td Vaccine (1 - Tdap) 10/16/1963   • IMM ZOSTER VACCINES (1 of 2) 10/16/1994   • COLON CANCER SCREENING ANNUAL FIT  03/22/2017   • Annual Pulmonary Function Test / Spirometry  06/13/2017   • IMM PNEUMOCOCCAL VACCINE: 65+ Years (2 of 2 - PCV13) 06/16/2018       - Patient plans to schedule appointment for Annual Wellness Visit (AWV) and Colonoscopy/FIT.    6. Orders for overdue Health Maintenance topics pended in Pre-Charting? N\A    7.  AHA (MDX) form printed for Provider? YES    8.  Patient was NOT informed to arrive 15 min prior to their scheduled appointment and bring in their medication bottles.

## 2020-10-16 ENCOUNTER — HOSPITAL ENCOUNTER (EMERGENCY)
Facility: MEDICAL CENTER | Age: 75
End: 2020-10-16
Attending: EMERGENCY MEDICINE
Payer: MEDICARE

## 2020-10-16 ENCOUNTER — APPOINTMENT (OUTPATIENT)
Dept: RADIOLOGY | Facility: MEDICAL CENTER | Age: 75
End: 2020-10-16
Attending: EMERGENCY MEDICINE
Payer: MEDICARE

## 2020-10-16 ENCOUNTER — NURSE TRIAGE (OUTPATIENT)
Dept: HEALTH INFORMATION MANAGEMENT | Facility: OTHER | Age: 75
End: 2020-10-16

## 2020-10-16 VITALS
HEIGHT: 68 IN | HEART RATE: 67 BPM | SYSTOLIC BLOOD PRESSURE: 128 MMHG | TEMPERATURE: 97.2 F | DIASTOLIC BLOOD PRESSURE: 75 MMHG | WEIGHT: 136.69 LBS | RESPIRATION RATE: 20 BRPM | BODY MASS INDEX: 20.72 KG/M2 | OXYGEN SATURATION: 100 %

## 2020-10-16 DIAGNOSIS — S22.31XA CLOSED FRACTURE OF ONE RIB OF RIGHT SIDE, INITIAL ENCOUNTER: ICD-10-CM

## 2020-10-16 DIAGNOSIS — S09.90XA CLOSED HEAD INJURY, INITIAL ENCOUNTER: ICD-10-CM

## 2020-10-16 LAB
ALBUMIN SERPL BCP-MCNC: 4.1 G/DL (ref 3.2–4.9)
ALBUMIN/GLOB SERPL: 1.5 G/DL
ALP SERPL-CCNC: 65 U/L (ref 30–99)
ALT SERPL-CCNC: 14 U/L (ref 2–50)
ANION GAP SERPL CALC-SCNC: 9 MMOL/L (ref 7–16)
APTT PPP: 26.2 SEC (ref 24.7–36)
AST SERPL-CCNC: 17 U/L (ref 12–45)
BASOPHILS # BLD AUTO: 0.5 % (ref 0–1.8)
BASOPHILS # BLD: 0.04 K/UL (ref 0–0.12)
BILIRUB SERPL-MCNC: 0.5 MG/DL (ref 0.1–1.5)
BUN SERPL-MCNC: 11 MG/DL (ref 8–22)
CALCIUM SERPL-MCNC: 9.4 MG/DL (ref 8.5–10.5)
CHLORIDE SERPL-SCNC: 101 MMOL/L (ref 96–112)
CO2 SERPL-SCNC: 27 MMOL/L (ref 20–33)
CREAT SERPL-MCNC: 0.66 MG/DL (ref 0.5–1.4)
EOSINOPHIL # BLD AUTO: 0.16 K/UL (ref 0–0.51)
EOSINOPHIL NFR BLD: 2 % (ref 0–6.9)
ERYTHROCYTE [DISTWIDTH] IN BLOOD BY AUTOMATED COUNT: 49.1 FL (ref 35.9–50)
GLOBULIN SER CALC-MCNC: 2.8 G/DL (ref 1.9–3.5)
GLUCOSE SERPL-MCNC: 91 MG/DL (ref 65–99)
HCT VFR BLD AUTO: 41.8 % (ref 42–52)
HGB BLD-MCNC: 13.7 G/DL (ref 14–18)
IMM GRANULOCYTES # BLD AUTO: 0.03 K/UL (ref 0–0.11)
IMM GRANULOCYTES NFR BLD AUTO: 0.4 % (ref 0–0.9)
INR PPP: 1.03 (ref 0.87–1.13)
LYMPHOCYTES # BLD AUTO: 0.98 K/UL (ref 1–4.8)
LYMPHOCYTES NFR BLD: 12.3 % (ref 22–41)
MCH RBC QN AUTO: 30.5 PG (ref 27–33)
MCHC RBC AUTO-ENTMCNC: 32.8 G/DL (ref 33.7–35.3)
MCV RBC AUTO: 93.1 FL (ref 81.4–97.8)
MONOCYTES # BLD AUTO: 0.57 K/UL (ref 0–0.85)
MONOCYTES NFR BLD AUTO: 7.1 % (ref 0–13.4)
NEUTROPHILS # BLD AUTO: 6.21 K/UL (ref 1.82–7.42)
NEUTROPHILS NFR BLD: 77.7 % (ref 44–72)
NRBC # BLD AUTO: 0 K/UL
NRBC BLD-RTO: 0 /100 WBC
PLATELET # BLD AUTO: 282 K/UL (ref 164–446)
PMV BLD AUTO: 9 FL (ref 9–12.9)
POTASSIUM SERPL-SCNC: 3.8 MMOL/L (ref 3.6–5.5)
PROT SERPL-MCNC: 6.9 G/DL (ref 6–8.2)
PROTHROMBIN TIME: 13.8 SEC (ref 12–14.6)
RBC # BLD AUTO: 4.49 M/UL (ref 4.7–6.1)
SODIUM SERPL-SCNC: 137 MMOL/L (ref 135–145)
WBC # BLD AUTO: 8 K/UL (ref 4.8–10.8)

## 2020-10-16 PROCEDURE — 85730 THROMBOPLASTIN TIME PARTIAL: CPT

## 2020-10-16 PROCEDURE — 85025 COMPLETE CBC W/AUTO DIFF WBC: CPT

## 2020-10-16 PROCEDURE — 36415 COLL VENOUS BLD VENIPUNCTURE: CPT

## 2020-10-16 PROCEDURE — 72128 CT CHEST SPINE W/O DYE: CPT

## 2020-10-16 PROCEDURE — 99285 EMERGENCY DEPT VISIT HI MDM: CPT

## 2020-10-16 PROCEDURE — 700117 HCHG RX CONTRAST REV CODE 255: Performed by: EMERGENCY MEDICINE

## 2020-10-16 PROCEDURE — 70450 CT HEAD/BRAIN W/O DYE: CPT

## 2020-10-16 PROCEDURE — 71045 X-RAY EXAM CHEST 1 VIEW: CPT

## 2020-10-16 PROCEDURE — 72131 CT LUMBAR SPINE W/O DYE: CPT

## 2020-10-16 PROCEDURE — 74177 CT ABD & PELVIS W/CONTRAST: CPT

## 2020-10-16 PROCEDURE — 80053 COMPREHEN METABOLIC PANEL: CPT

## 2020-10-16 PROCEDURE — 85610 PROTHROMBIN TIME: CPT

## 2020-10-16 RX ORDER — HYDROMORPHONE HYDROCHLORIDE 1 MG/ML
0.5 INJECTION, SOLUTION INTRAMUSCULAR; INTRAVENOUS; SUBCUTANEOUS ONCE
Status: DISCONTINUED | OUTPATIENT
Start: 2020-10-16 | End: 2020-10-16 | Stop reason: HOSPADM

## 2020-10-16 RX ORDER — ONDANSETRON 2 MG/ML
4 INJECTION INTRAMUSCULAR; INTRAVENOUS ONCE
Status: DISCONTINUED | OUTPATIENT
Start: 2020-10-16 | End: 2020-10-16 | Stop reason: HOSPADM

## 2020-10-16 RX ADMIN — IOHEXOL 90 ML: 350 INJECTION, SOLUTION INTRAVENOUS at 19:33

## 2020-10-16 ASSESSMENT — FIBROSIS 4 INDEX: FIB4 SCORE: 1.18

## 2020-10-16 ASSESSMENT — LIFESTYLE VARIABLES: DO YOU DRINK ALCOHOL: NO

## 2020-10-16 NOTE — TELEPHONE ENCOUNTER
"Remi  [phone]     Right side of chest hurts.  If he sneezes it hurts worse.    Patient has been coughing started 2 weeks ago.  Pain is getting much worse.    He states he struggles with breathing at times.      Reason for Disposition  • SEVERE chest pain    Additional Information  • Negative: SEVERE difficulty breathing (e.g., struggling for each breath, speaks in single words)  • Negative: Passed out (i.e., fainted, collapsed and was not responding)  • Negative: Chest pain lasting longer than 5 minutes and ANY of the following:* Over 50 years old* Over 30 years old and at least one cardiac risk factor (i.e., high blood pressure, diabetes, high cholesterol, obesity, smoker or strong family history of heart disease)* Pain is crushing, pressure-like, or heavy * Took nitroglycerin and chest pain was not relieved* History of heart disease (i.e., angina, heart attack, bypass surgery, angioplasty, CHF)  • Negative: Visible sweat on face or sweat dripping down face  • Negative: Sounds like a life-threatening emergency to the triager  • Negative: Followed an injury to chest    Answer Assessment - Initial Assessment Questions  1. LOCATION: \"Where does it hurt?\"        RIGHT side of chest  2. RADIATION: \"Does the pain go anywhere else?\" (e.g., into neck, jaw, arms, back)      Hurts when coughing or sneezing  3. ONSET: \"When did the chest pain begin?\" (Minutes, hours or days)       Started about 2 weeks ago and is getting worse  4. PATTERN \"Does the pain come and go, or has it been constant since it started?\"  \"Does it get worse with exertion?\"       No pattern  5. DURATION: \"How long does it last\" (e.g., seconds, minutes, hours)      constant  6. SEVERITY: \"How bad is the pain?\"  (e.g., Scale 1-10; mild, moderate, or severe)     - MILD (1-3): doesn't interfere with normal activities      - MODERATE (4-7): interferes with normal activities or awakens from sleep     - SEVERE (8-10): excruciating pain, unable to do any " "normal activities        6-7/10  7. CARDIAC RISK FACTORS: \"Do you have any history of heart problems or risk factors for heart disease?\" (e.g., prior heart attack, angina; high blood pressure, diabetes, being overweight, high cholesterol, smoking, or strong family history of heart disease)      no  8. PULMONARY RISK FACTORS: \"Do you have any history of lung disease?\"  (e.g., blood clots in lung, asthma, emphysema, birth control pills)      no  9. CAUSE: \"What do you think is causing the chest pain?\"      unknown  10. OTHER SYMPTOMS: \"Do you have any other symptoms?\" (e.g., dizziness, nausea, vomiting, sweating, fever, difficulty breathing, cough)        Coughing and sneezing  11. PREGNANCY: \"Is there any chance you are pregnant?\" \"When was your last menstrual period?\"        no    Protocols used: CHEST PAIN-A-OH      "

## 2020-10-17 NOTE — ED NOTES
Pt BIB EMS from home.  Pt c/o right rib pain and right lower back pain.  Pt had fall between 2 days and a wk ago.  Pt has dementia, pt unable to recall details.  Pt is on blood thinners per EMS.  Pt wasn't brought in after fall happened.  Pt lives with his girlfriend.  Pain worse with deep inspiration and with movement.  Abrasion to left FA.  ERP to see.

## 2020-10-17 NOTE — ED PROVIDER NOTES
ED Provider Note    CHIEF COMPLAINT  Chief Complaint   Patient presents with   • Side Pain     right side pain,    • Low Back Pain       HPI  Remi Matt is a 75 y.o. male who presents for evaluation of trauma.  The patient reportedly fell 2 days ago.  Reports it was a mechanical slip and fall.  He struck the left aspect of his forehead and landed on his right side.  He reports headache.  No neck pain.  He does report mid and low back pain.  Reports pain on the right lateral chest wall and right flank.  He reports pain with inspiration.  No hemoptysis.  Is not sought medical care for this yet.  It is unclear whether or not the patient is on blood thinners.  He denies any pain or injury to the upper or lower extremities.  No numbness weakness or tingling  REVIEW OF SYSTEMS  See HPI for further details.  No loss of consciousness numbness weakness or tingling all other systems are negative.     PAST MEDICAL HISTORY  Past Medical History:   Diagnosis Date   • Asthma    • COPD (chronic obstructive pulmonary disease) ([facility])        FAMILY HISTORY  Noncontributory    SOCIAL HISTORY  Social History     Socioeconomic History   • Marital status:      Spouse name: Not on file   • Number of children: Not on file   • Years of education: Not on file   • Highest education level: Not on file   Occupational History   • Not on file   Social Needs   • Financial resource strain: Not on file   • Food insecurity     Worry: Not on file     Inability: Not on file   • Transportation needs     Medical: Not on file     Non-medical: Not on file   Tobacco Use   • Smoking status: Current Every Day Smoker     Packs/day: 0.25     Years: 20.00     Pack years: 5.00     Types: Cigarettes   • Smokeless tobacco: Never Used   • Tobacco comment: smoked for 10+ years, quit for about 5, started smoking again about 7-8 years ago   Substance and Sexual Activity   • Alcohol use: Yes     Alcohol/week: 0.0 oz     Comment: occ   • Drug use:  "No   • Sexual activity: Yes     Partners: Female     Comment: single retired    Lifestyle   • Physical activity     Days per week: Not on file     Minutes per session: Not on file   • Stress: Not on file   Relationships   • Social connections     Talks on phone: Not on file     Gets together: Not on file     Attends [employer] service: Not on file     Active member of club or organization: Not on file     Attends meetings of clubs or organizations: Not on file     Relationship status: Not on file   • Intimate partner violence     Fear of current or ex partner: Not on file     Emotionally abused: Not on file     Physically abused: Not on file     Forced sexual activity: Not on file   Other Topics Concern   • Not on file   Social History Narrative   • Not on file     Denies IV drugs  SURGICAL HISTORY  Past Surgical History:   Procedure Laterality Date   • ARTHROSCOPY, KNEE  1978    meniscus, right   • HERNIA REPAIR  child   • TONSILLECTOMY  child       CURRENT MEDICATIONS  Home Medications     Reviewed by Cele Porter R.N. (Registered Nurse) on 10/16/20 at 1704  Med List Status: Partial   Medication Last Dose Status   Ascorbic Acid (VITAMIN C) 1000 MG Tab  Active   Calcium 600 MG Tab  Active   Fluticasone Furoate-Vilanterol (BREO ELLIPTA) 100-25 MCG/INH AEROSOL POWDER, BREATH ACTIVATED  Active   ipratropium-albuterol (COMBIVENT RESPIMAT)  MCG/ACT Aero Soln  Active   ipratropium-albuterol (DUONEB) 0.5-2.5 (3) MG/3ML nebulizer solution  Active   Multiple Vitamins-Minerals (CENTRUM SILVER ADULT 50+ PO)  Active   Omega-3 Fatty Acids (FISH OIL) 1200 MG Cap  Active   varenicline (CHANTIX SANDRA) 0.5 MG X 11 & 1 MG X 42 tablet  Active                ALLERGIES  No Known Allergies    PHYSICAL EXAM  VITAL SIGNS: /64   Pulse 65   Temp 36.4 °C (97.5 °F) (Temporal)   Resp 16   Ht 1.727 m (5' 8\")   Wt 62 kg (136 lb 11 oz)   SpO2 100%   BMI 20.78 kg/m²       Constitutional: Well developed, Well " nourished, No acute distress, Non-toxic appearance.   HENT: 2 X 2 centimeter hematoma noted to the left aspect of the forehead, Bilateral external ears normal, Oropharynx moist, No oral exudates, Nose normal.   Eyes: PERRLA, EOMI, Conjunctiva normal, No discharge.   Neck: Normal range of motion, No midline bony tenderness, Supple, No stridor.   Lymphatic: No lymphadenopathy noted.   Cardiovascular: Normal heart rate, Normal rhythm, No murmurs, No rubs, No gallops.   Thorax & Lungs: Normal breath sounds, No respiratory distress, No wheezing, tenderness noted over the sternum on the right side and right lateral chest wall no crepitus  Abdomen: Bowel sounds normal, Soft, No tenderness, No masses, No pulsatile masses.   Skin: Warm, Dry, No erythema, No rash.   Back: No tenderness, No CVA tenderness.   Extremities: Intact distal pulses, No edema, No tenderness, No cyanosis, No clubbing.   Neurologic: Alert & oriented x 3, Normal motor function, Normal sensory function, No focal deficits noted.   Psychiatric: Affect normal, Judgment normal, Mood normal.       RADIOLOGY/PROCEDURES  CT-CHEST,ABDOMEN,PELVIS WITH   Final Result      1.  Right 7th anterolateral rib fracture      2.  Mild atelectasis      3.  No other acute abnormality within the chest, abdomen, pelvis      4.  Emphysema      5.  Aortic and branch vessel atherosclerotic plaque      CT-TSPINE W/O PLUS RECONS   Final Result      No CT evidence of acute traumatic abnormality.      CT-LSPINE W/O PLUS RECONS   Final Result      No CT evidence of acute traumatic injury.      CT-HEAD W/O   Final Result      1.  No acute intracranial abnormality      2.  Cerebral volume loss and white matter change      DX-CHEST-PORTABLE (1 VIEW)   Final Result      Minimal atelectasis        Results for orders placed or performed during the hospital encounter of 10/16/20   CBC WITH DIFFERENTIAL   Result Value Ref Range    WBC 8.0 4.8 - 10.8 K/uL    RBC 4.49 (L) 4.70 - 6.10 M/uL     Hemoglobin 13.7 (L) 14.0 - 18.0 g/dL    Hematocrit 41.8 (L) 42.0 - 52.0 %    MCV 93.1 81.4 - 97.8 fL    MCH 30.5 27.0 - 33.0 pg    MCHC 32.8 (L) 33.7 - 35.3 g/dL    RDW 49.1 35.9 - 50.0 fL    Platelet Count 282 164 - 446 K/uL    MPV 9.0 9.0 - 12.9 fL    Neutrophils-Polys 77.70 (H) 44.00 - 72.00 %    Lymphocytes 12.30 (L) 22.00 - 41.00 %    Monocytes 7.10 0.00 - 13.40 %    Eosinophils 2.00 0.00 - 6.90 %    Basophils 0.50 0.00 - 1.80 %    Immature Granulocytes 0.40 0.00 - 0.90 %    Nucleated RBC 0.00 /100 WBC    Neutrophils (Absolute) 6.21 1.82 - 7.42 K/uL    Lymphs (Absolute) 0.98 (L) 1.00 - 4.80 K/uL    Monos (Absolute) 0.57 0.00 - 0.85 K/uL    Eos (Absolute) 0.16 0.00 - 0.51 K/uL    Baso (Absolute) 0.04 0.00 - 0.12 K/uL    Immature Granulocytes (abs) 0.03 0.00 - 0.11 K/uL    NRBC (Absolute) 0.00 K/uL   Comp Metabolic Panel   Result Value Ref Range    Sodium 137 135 - 145 mmol/L    Potassium 3.8 3.6 - 5.5 mmol/L    Chloride 101 96 - 112 mmol/L    Co2 27 20 - 33 mmol/L    Anion Gap 9.0 7.0 - 16.0    Glucose 91 65 - 99 mg/dL    Bun 11 8 - 22 mg/dL    Creatinine 0.66 0.50 - 1.40 mg/dL    Calcium 9.4 8.5 - 10.5 mg/dL    AST(SGOT) 17 12 - 45 U/L    ALT(SGPT) 14 2 - 50 U/L    Alkaline Phosphatase 65 30 - 99 U/L    Total Bilirubin 0.5 0.1 - 1.5 mg/dL    Albumin 4.1 3.2 - 4.9 g/dL    Total Protein 6.9 6.0 - 8.2 g/dL    Globulin 2.8 1.9 - 3.5 g/dL    A-G Ratio 1.5 g/dL   Prothrombin Time   Result Value Ref Range    PT 13.8 12.0 - 14.6 sec    INR 1.03 0.87 - 1.13   APTT   Result Value Ref Range    APTT 26.2 24.7 - 36.0 sec   ESTIMATED GFR   Result Value Ref Range    GFR If African American >60 >60 mL/min/1.73 m 2    GFR If Non African American >60 >60 mL/min/1.73 m 2        COURSE & MEDICAL DECISION MAKING  Pertinent Labs & Imaging studies reviewed. (See chart for details)  Patient presents here after a ground-level fall with head injury and thoracic and blunt abdominal trauma.  Differential diagnosis was attempted including  intracranial hemorrhage, subdural hemorrhage, multiple rib fractures pneumothorax hemothorax or solid organ injury.  The patient has an isolated right seventh rib fracture without associated pneumothorax or hemothorax.  No evidence of solid organ injury.  The patient was given a small amount of pain medicine.  He does not require oxygen.  He has very minimal pain actually.  Given his age we will hold off on narcotics and I recommended ibuprofen and Tylenol.    FINAL IMPRESSION  1.  Closed head injury  2.  Blunt chest and abdominal trauma  3.  Right-sided seventh rib fracture         Electronically signed by: Joel Zamora M.D., 10/16/2020 5:07 PM

## 2020-10-17 NOTE — ED NOTES
IS and education provided to pt. Discharge instructions and follow up care discussed with patient. Patient given time to ask questions and verbalized understanding. Patient AOx4 at discharge and ambulatory to lobby with steady gait.

## 2021-02-17 ENCOUNTER — TELEPHONE (OUTPATIENT)
Dept: MEDICAL GROUP | Facility: PHYSICIAN GROUP | Age: 76
End: 2021-02-17

## 2021-02-17 NOTE — TELEPHONE ENCOUNTER
NEW TO YOU PRE-VISIT PLANNING     Patient was NOT contacted to complete PVP.     Note: Patient will not be contacted if there is no indication to call.     1.  Reviewed notes from the last few office visits within the medical group: Yes    2.  If any orders were placed at last visit or intended to be done for this visit (i.e. 6 mos follow-up), do we have Results/Consult Notes?         •  Labs - Labs were not ordered at last office visit.  Note: If patient appointment is for lab review and patient did not complete labs, check with provider if OK to reschedule patient until labs completed.       •  Imaging - Imaging was not ordered at last office visit.       •  Referrals - No referrals were ordered at last office visit.    3. Is this appointment scheduled as a Hospital Follow-Up? No    4.  Immunizations were updated in Epic using Reconcile Outside Information activity? Yes    5.  Patient is due for the following Health Maintenance Topics:   Health Maintenance Due   Topic Date Due   • Annual Wellness Visit  1945   • IMM ZOSTER VACCINES (1 of 2) 10/16/1994   • COLON CANCER SCREENING ANNUAL FIT  03/22/2017   • Annual Pulmonary Function Test / Spirometry  06/13/2017   • IMM INFLUENZA (1) 09/01/2020     6.  AHA (Pulse8) form printed for Provider? Email sent to [facility] requesting form

## 2021-02-25 ENCOUNTER — TELEPHONE (OUTPATIENT)
Dept: MEDICAL GROUP | Facility: PHYSICIAN GROUP | Age: 76
End: 2021-02-25

## 2021-02-25 ENCOUNTER — OFFICE VISIT (OUTPATIENT)
Dept: MEDICAL GROUP | Facility: PHYSICIAN GROUP | Age: 76
End: 2021-02-25
Payer: MEDICARE

## 2021-02-25 VITALS
TEMPERATURE: 97.1 F | OXYGEN SATURATION: 98 % | HEIGHT: 68 IN | WEIGHT: 153.6 LBS | DIASTOLIC BLOOD PRESSURE: 74 MMHG | SYSTOLIC BLOOD PRESSURE: 118 MMHG | HEART RATE: 71 BPM | BODY MASS INDEX: 23.28 KG/M2

## 2021-02-25 DIAGNOSIS — R35.0 BENIGN PROSTATIC HYPERPLASIA WITH URINARY FREQUENCY: ICD-10-CM

## 2021-02-25 DIAGNOSIS — I65.23 BILATERAL CAROTID ARTERY STENOSIS: ICD-10-CM

## 2021-02-25 DIAGNOSIS — J41.1 MUCOPURULENT CHRONIC BRONCHITIS (HCC): ICD-10-CM

## 2021-02-25 DIAGNOSIS — I35.0 AORTIC STENOSIS, MODERATE: ICD-10-CM

## 2021-02-25 DIAGNOSIS — F01.518 VASCULAR DEMENTIA WITH BEHAVIOR DISTURBANCE (HCC): ICD-10-CM

## 2021-02-25 DIAGNOSIS — J42 CHRONIC BRONCHITIS, UNSPECIFIED CHRONIC BRONCHITIS TYPE (HCC): ICD-10-CM

## 2021-02-25 DIAGNOSIS — J41.8 MIXED SIMPLE AND MUCOPURULENT CHRONIC BRONCHITIS (HCC): ICD-10-CM

## 2021-02-25 DIAGNOSIS — N40.1 BENIGN PROSTATIC HYPERPLASIA WITH URINARY FREQUENCY: ICD-10-CM

## 2021-02-25 DIAGNOSIS — E78.5 DYSLIPIDEMIA: ICD-10-CM

## 2021-02-25 PROBLEM — L60.8 TOENAIL DEFORMITY: Status: RESOLVED | Noted: 2019-10-14 | Resolved: 2021-02-25

## 2021-02-25 PROCEDURE — 99204 OFFICE O/P NEW MOD 45 MIN: CPT | Performed by: INTERNAL MEDICINE

## 2021-02-25 RX ORDER — QUETIAPINE FUMARATE 25 MG/1
TABLET, FILM COATED ORAL
COMMUNITY
Start: 2020-08-21 | End: 2021-02-26 | Stop reason: SDUPTHER

## 2021-02-25 RX ORDER — ATORVASTATIN CALCIUM 80 MG/1
TABLET, FILM COATED ORAL DAILY
COMMUNITY
Start: 2020-08-21 | End: 2021-02-26 | Stop reason: SDUPTHER

## 2021-02-25 RX ORDER — ALFUZOSIN HYDROCHLORIDE 10 MG/1
10 TABLET, EXTENDED RELEASE ORAL
COMMUNITY
Start: 2021-01-10 | End: 2021-02-26 | Stop reason: SDUPTHER

## 2021-02-25 RX ORDER — DONEPEZIL HYDROCHLORIDE 10 MG/1
TABLET, FILM COATED ORAL DAILY
COMMUNITY
Start: 2020-08-21 | End: 2021-02-26 | Stop reason: SDUPTHER

## 2021-02-25 RX ORDER — CLOPIDOGREL BISULFATE 75 MG/1
TABLET ORAL DAILY
COMMUNITY
Start: 2020-08-21 | End: 2021-02-26 | Stop reason: SDUPTHER

## 2021-02-25 ASSESSMENT — PATIENT HEALTH QUESTIONNAIRE - PHQ9
SUM OF ALL RESPONSES TO PHQ QUESTIONS 1-9: 9
CLINICAL INTERPRETATION OF PHQ2 SCORE: 2
5. POOR APPETITE OR OVEREATING: 1 - SEVERAL DAYS

## 2021-02-25 ASSESSMENT — FIBROSIS 4 INDEX: FIB4 SCORE: 1.22

## 2021-02-25 NOTE — LETTER
[facility]  Ruby Henriquez M.D.  [address]  [place] [address]  Fax: [phone]   Authorization for Release/Disclosure of   Protected Health Information   Name: REMI MATT : 1945 SSN: [id number]   Address: [address] [place] [address] [address] [place] [address] Phone:    [phone] (home)    I authorize the entity listed below to release/disclose the PHI below to:   [facility]/Ruby Henriquez M.D. and Ruby Henriquez M.D.   Provider or Entity Name:  Javi Barreto   Address   City, State, [facility]   Phone:      Fax:     Reason for request: continuity of care   Information to be released:    [  ] LAST COLONOSCOPY,  including any PATH REPORT and follow-up  [  ] LAST FIT/COLOGUARD RESULT [  ] LAST DEXA  [  ] LAST MAMMOGRAM  [  ] LAST PAP  [  ] LAST LABS [  ] RETINA EXAM REPORT  [  ] IMMUNIZATION RECORDS  [  ] Release all info      [  ] Check here and initial the line next to each item to release ALL health information INCLUDING  _____ Care and treatment for drug and / or alcohol abuse  _____ HIV testing, infection status, or AIDS  _____ Genetic Testing    DATES OF SERVICE OR TIME PERIOD TO BE DISCLOSED: _____________  I understand and acknowledge that:  * This Authorization may be revoked at any time by you in writing, except if your health information has already been used or disclosed.  * Your health information that will be used or disclosed as a result of you signing this authorization could be re-disclosed by the recipient. If this occurs, your re-disclosed health information may no longer be protected by State or Federal laws.  * You may refuse to sign this Authorization. Your refusal will not affect your ability to obtain treatment.  * This Authorization becomes effective upon signing and will  on (date) __________.      If no date is indicated, this Authorization will  one (1) year from the signature date.    Name: Remi Matt    Signature:   Date:          2/25/2021       PLEASE FAX REQUESTED RECORDS BACK TO: [phone]

## 2021-02-25 NOTE — PROGRESS NOTES
CC: Establish medical care.    HPI:  Remi presents with the following.  Patient is here with his girlfriend's daughter Danielle.  Patient recently moved back to [place] from [facility] 6 months ago, but has lived in [place] in the past.  He has been with his girlfriend for the last 30 years.  Today's visit was conducted with Danielle.    1. Vascular dementia with behavior disturbance (HCC)  Patient has a history of dementia that was diagnosed over the last few years.  Danielle states that since Remi has moved back to [place] she has noticed a severe decline in his dementia over the last 6 months.  The family did not have much connection with patient while he was living in [place].  They do not know his full medical history.  As the family did not know what medications Remi was on, he has been off of all his medications for the last 6 months.  This including Aricept and Seroquel.  They found his medications in his suitcase.  Per Danielle, Remi has had decline in mentation, memory.  Patient is unable to care for himself which includes cooking, self-care, grooming.  Patient is up using the restroom all throughout the night and is mostly incontinent.  He has also been very aggressive and short tempered.  Remi's girlfriend, Danielle's mother, also suffers from dementia and is currently on hospice with [facility].  Danielle and her sister care for Remi and her mother.  They are the sole caretakers and financial supporters.  Danielle is now on FMLA to help care for them.  She is requesting home health and respite care what ever is available to them.  They are not interested in skilled nursing facility placement at this time.    2. Aortic stenosis, moderate  Diagnosis was noted in chart.  Family is unaware of Remi's cardiac history.  He is on high-dose Lipitor and Plavix.    3. Bilateral carotid artery stenosis mild  Refer to #2.    4. Mixed simple and mucopurulent chronic bronchitis (HCC)  Per Danielle, she  is aware that Remi has been treated in the emergency room for COPD exacerbations.  Per her knowledge he should be on Combivent and Breo Ellipta.    5. Benign prostatic hyperplasia with urinary frequency  Patient's medication includes Uroxatrol but he has not been taking his medications.  That he has noticed incontinence and increased urinary frequency, mostly at nighttime.        Patient Active Problem List    Diagnosis Date Noted   • Vascular dementia with behavior disturbance (HCC) 02/25/2021   • Benign prostatic hyperplasia with urinary frequency 02/25/2021   • Dyslipidemia 02/25/2021   • Tobacco abuse 10/11/2019   • Arthritis 07/07/2017   • Aortic stenosis, moderate 09/30/2015   • Bilateral carotid artery stenosis mild 09/24/2015   • COPD (chronic obstructive pulmonary disease) ([facility])        Current Outpatient Medications   Medication Sig Dispense Refill   • alfuzosin (UROXATRAL) 10 MG SR tablet Take 10 mg by mouth every day.     • atorvastatin (LIPITOR) 80 MG tablet every day.     • clopidogrel (PLAVIX) 75 MG Tab every day.     • donepezil (ARICEPT) 10 MG tablet every day.     • QUEtiapine (SEROQUEL) 25 MG Tab 2 (two) times a day.     • ipratropium-albuterol (COMBIVENT RESPIMAT)  MCG/ACT Aero Soln Inhale 2 Puffs by mouth every 6 hours as needed. (Patient not taking: Reported on 2/25/2021) 1 Inhaler 11   • varenicline (CHANTIX SANDRA) 0.5 MG X 11 & 1 MG X 42 tablet Use SANDRA as directed (Patient not taking: Reported on 2/25/2021) 56 Tab 0   • Fluticasone Furoate-Vilanterol (BREO ELLIPTA) 100-25 MCG/INH AEROSOL POWDER, BREATH ACTIVATED Inhale 1 Puff by mouth every day. (Patient not taking: Reported on 2/25/2021) 60 Each 5   • Omega-3 Fatty Acids (FISH OIL) 1200 MG Cap Take 1,200 mg by mouth every day.     • Multiple Vitamins-Minerals (CENTRUM SILVER ADULT 50+ PO) Take 1 tablet by mouth every day.     • Ascorbic Acid (VITAMIN C) 1000 MG Tab Take 1,000 mg by mouth every day.     • Calcium 600 MG Tab Take 600 mg  by mouth every day.     • ipratropium-albuterol (DUONEB) 0.5-2.5 (3) MG/3ML nebulizer solution USE 1 VIAL INTO NEBULIZER 2 TIMES DAILY AS NEEDED FOR SHORTNESS OF BREATH (Patient not taking: Reported on 2/25/2021) 180 mL 11     No current facility-administered medications for this visit.         Allergies as of 02/25/2021   • (No Known Allergies)        Social History     Socioeconomic History   • Marital status:      Spouse name: Not on file   • Number of children: Not on file   • Years of education: Not on file   • Highest education level: Not on file   Occupational History   • Not on file   Tobacco Use   • Smoking status: Current Every Day Smoker     Packs/day: 0.25     Years: 20.00     Pack years: 5.00     Types: Cigarettes   • Smokeless tobacco: Never Used   • Tobacco comment: smoked for 10+ years, quit for about 5, started smoking again about 7-8 years ago   Substance and Sexual Activity   • Alcohol use: Yes     Alcohol/week: 0.0 oz     Comment: occ   • Drug use: No   • Sexual activity: Yes     Partners: Female     Comment: single retired    Other Topics Concern   • Not on file   Social History Narrative   • Not on file     Social Determinants of Health     Financial Resource Strain:    • Difficulty of Paying Living Expenses:    Food Insecurity:    • Worried About Running Out of Food in the Last Year:    • Ran Out of Food in the Last Year:    Transportation Needs:    • Lack of Transportation (Medical):    • Lack of Transportation (Non-Medical):    Physical Activity:    • Days of Exercise per Week:    • Minutes of Exercise per Session:    Stress:    • Feeling of Stress :    Social Connections:    • Frequency of Communication with Friends and Family:    • Frequency of Social Gatherings with Friends and Family:    • Attends Anabaptist Services:    • Active Member of Clubs or Organizations:    • Attends Club or Organization Meetings:    • Marital Status:    Intimate Partner Violence:    •  "Fear of Current or Ex-Partner:    • Emotionally Abused:    • Physically Abused:    • Sexually Abused:        Family History   Problem Relation Age of Onset   • Cancer Neg Hx    • Diabetes Neg Hx    • Heart Disease Neg Hx    • Stroke Neg Hx    • Heart Attack Neg Hx        Past Surgical History:   Procedure Laterality Date   • ARTHROSCOPY, KNEE  1978    meniscus, right   • HERNIA REPAIR  child   • TONSILLECTOMY  child       ROS: ROS per HPI.    Denies any Headache,Chest pain,  Shortness of breath,  Abdominal pain, Changes of bowel or bladder, Lower ext edema, Fevers, Nights sweats, Weight Changes, Focal weakness or numbness.  All other systems are negative.    /74 (BP Location: Right arm, Patient Position: Sitting, BP Cuff Size: Adult)   Pulse 71   Temp 36.2 °C (97.1 °F) (Temporal)   Ht 1.727 m (5' 8\")   Wt 69.7 kg (153 lb 9.6 oz)   SpO2 98%   BMI 23.35 kg/m²      Physical Exam:  Gen:         Alert . No apparent distress. Somnolent.  Poor historian  HEENT:   Perrla, TM clear,  Oralpharynx no erythema or exudates.  Neck:       No Jugular venous distension, Lymphadenopathy, Thyromegaly, Bruits.  Lungs:     Clear to auscultation bilaterally  CV:          Regular rate and rhythm. No murmurs, rubs or gallops.  Abd:         Soft non tender, non distended. Normal active bowel sounds.             Ext:          No clubbing, cyanosis, edema.      Assessment and Plan.   75 y.o. male presenting with the following.     1. Vascular dementia with behavior disturbance (HCC)  Medications reviewed and refilled.  Provided resources for HHD, respite care    2. Aortic stenosis, moderate  Release of information from PCP, Dr. TUCKER Barreto    3. Bilateral carotid artery stenosis mild  Refer to #2    4. Mixed simple and mucopurulent chronic bronchitis (HCC)  Refill inhalers as prescribed  Currently stable    5. Benign prostatic hyperplasia with urinary frequency  Refill Uroxatrol    6. Dyslipidemia  Refill Lipitor at current " dose.    We will review medical records from PCP   Labs will be ordered.

## 2021-02-26 ENCOUNTER — TELEPHONE (OUTPATIENT)
Dept: MEDICAL GROUP | Facility: PHYSICIAN GROUP | Age: 76
End: 2021-02-26

## 2021-02-26 RX ORDER — ALFUZOSIN HYDROCHLORIDE 10 MG/1
10 TABLET, EXTENDED RELEASE ORAL
Qty: 90 TABLET | Refills: 1 | Status: SHIPPED | OUTPATIENT
Start: 2021-02-26

## 2021-02-26 RX ORDER — CLOPIDOGREL BISULFATE 75 MG/1
75 TABLET ORAL DAILY
Qty: 90 TABLET | Refills: 1 | Status: ON HOLD | OUTPATIENT
Start: 2021-02-26 | End: 2021-03-31

## 2021-02-26 RX ORDER — QUETIAPINE FUMARATE 25 MG/1
25 TABLET, FILM COATED ORAL 2 TIMES DAILY
Qty: 180 TABLET | Refills: 1 | Status: SHIPPED | OUTPATIENT
Start: 2021-02-26

## 2021-02-26 RX ORDER — IPRATROPIUM BROMIDE AND ALBUTEROL 20; 100 UG/1; UG/1
2 SPRAY, METERED RESPIRATORY (INHALATION) EVERY 6 HOURS PRN
Qty: 1 EACH | Refills: 1 | Status: SHIPPED | OUTPATIENT
Start: 2021-02-26

## 2021-02-26 RX ORDER — DONEPEZIL HYDROCHLORIDE 10 MG/1
10 TABLET, FILM COATED ORAL DAILY
Qty: 90 TABLET | Refills: 1 | Status: SHIPPED | OUTPATIENT
Start: 2021-02-26

## 2021-02-26 RX ORDER — ATORVASTATIN CALCIUM 80 MG/1
80 TABLET, FILM COATED ORAL EVERY EVENING
Qty: 90 TABLET | Refills: 1 | Status: SHIPPED | OUTPATIENT
Start: 2021-02-26

## 2021-02-26 NOTE — TELEPHONE ENCOUNTER
Please review my office visit note from 2/25/2021.  Please reach out to patient with resources available through [facility].Thanks!

## 2021-03-03 DIAGNOSIS — Z23 NEED FOR VACCINATION: ICD-10-CM

## 2021-03-04 ENCOUNTER — TELEPHONE (OUTPATIENT)
Dept: MEDICAL GROUP | Facility: PHYSICIAN GROUP | Age: 76
End: 2021-03-04

## 2021-03-05 NOTE — TELEPHONE ENCOUNTER
Called [facility] personal care asst assigned to pt to help facilitate pt needs/ services on 3/1/21.   Unable to view whether contact has been made w/pt.  Called Tanya again today and LVM with pt info, pt's needs and my telephone number. Will follow-up on Monday.

## 2021-03-08 NOTE — TELEPHONE ENCOUNTER
Called [facility] again to attempt services for pt. Spoke with Tino, explained that multiple voice mails have been left requesting Tanya contact pt for services, yet there are no notes in pt chart that pt has been contacted. Tino reported Tanya was not in the office today. Unable to resolve situation today.

## 2021-03-09 ENCOUNTER — TELEPHONE (OUTPATIENT)
Dept: MEDICAL GROUP | Facility: PHYSICIAN GROUP | Age: 76
End: 2021-03-09

## 2021-03-09 NOTE — TELEPHONE ENCOUNTER
Pt has not been contacted by [facility] Personal Care Attendant- called and M w/manager in attempt to connect pt with PCA for needed services.

## 2021-03-14 ENCOUNTER — APPOINTMENT (OUTPATIENT)
Dept: RADIOLOGY | Facility: MEDICAL CENTER | Age: 76
End: 2021-03-14
Attending: EMERGENCY MEDICINE
Payer: MEDICARE

## 2021-03-14 ENCOUNTER — HOSPITAL ENCOUNTER (EMERGENCY)
Facility: MEDICAL CENTER | Age: 76
End: 2021-03-14
Attending: EMERGENCY MEDICINE
Payer: MEDICARE

## 2021-03-14 VITALS
RESPIRATION RATE: 16 BRPM | BODY MASS INDEX: 23.19 KG/M2 | TEMPERATURE: 97.7 F | HEART RATE: 74 BPM | SYSTOLIC BLOOD PRESSURE: 122 MMHG | WEIGHT: 153 LBS | HEIGHT: 68 IN | DIASTOLIC BLOOD PRESSURE: 69 MMHG | OXYGEN SATURATION: 96 %

## 2021-03-14 DIAGNOSIS — M25.551 RIGHT HIP PAIN: ICD-10-CM

## 2021-03-14 DIAGNOSIS — M79.604 ACUTE PAIN OF RIGHT LOWER EXTREMITY: ICD-10-CM

## 2021-03-14 PROCEDURE — 73502 X-RAY EXAM HIP UNI 2-3 VIEWS: CPT | Mod: RT

## 2021-03-14 PROCEDURE — 99284 EMERGENCY DEPT VISIT MOD MDM: CPT

## 2021-03-14 PROCEDURE — 93971 EXTREMITY STUDY: CPT | Mod: RT

## 2021-03-14 ASSESSMENT — LIFESTYLE VARIABLES: DO YOU DRINK ALCOHOL: NO

## 2021-03-14 ASSESSMENT — FIBROSIS 4 INDEX: FIB4 SCORE: 1.22

## 2021-03-14 ASSESSMENT — PAIN DESCRIPTION - DESCRIPTORS: DESCRIPTORS: CRAMPING

## 2021-03-14 NOTE — DISCHARGE PLANNING
Medical Social Work    Per family, pt has transferred home via [facility] in the past and family requesting assistance w/ transportation as family unable to  pt today due to pt's behaviors and caring for another family member w/ dementia. Pt is unsafe and unable to travel via taxi and pt unsteady on feet needing assistance in and out of bed.     LSW faxed Facesheet and PCS to [facility]. Transport arranged w/ [facility] @ [facility] for 1200. Bedside RN updated.

## 2021-03-14 NOTE — ED TRIAGE NOTES
.  Chief Complaint   Patient presents with   • Leg Pain     right leg pain      biba from home pt lives with girlfriend and her daughter. He has hx dementia per report. a&o x 2 pt denies fall or injury. C/o right leg pain onset yesterday morning and then again this morning. Pt currently pain free. Ambulated to [facility] with cane.

## 2021-03-14 NOTE — DISCHARGE INSTRUCTIONS
Follow-up with primary care 1 to 2 days for reevaluation, medication management, close blood pressure monitoring and further work-up or evaluation if leg pain recurs.    Continue medications as previously indicated.    Activity as tolerated.    Return to the emergency department for persistent worsening leg pain, paresthesias, weakness, swelling, discoloration, fever or other new concerns.

## 2021-03-14 NOTE — ED NOTES
assisting with transportation home. Danielle caregiver updated and discharge instructions reviewed over the phone with her.

## 2021-03-14 NOTE — ED NOTES
[facility] reviewed discharge instruction and questions answered.

## 2021-03-14 NOTE — ED PROVIDER NOTES
"ED Provider Note    CHIEF COMPLAINT  Chief Complaint   Patient presents with   • Leg Pain     right leg pain        HPI  Remi Matt is a 75 y.o. male who presents to the emergency department by ambulance from home for right hip and proximal leg pain.  Patient states he awoke from sleep with sudden onset, intractable pain \"like a cramp.\"  Difficulty moving it without worsening pain, but since has resolved completely.  Patient states he had similar episode yesterday, also self-limiting.  Denies any fall, trauma or other strain pattern.  Denies any back pain.  Denies paresthesias or weakness.  Denies swelling, discoloration.  No medication for discomfort at home.    REVIEW OF SYSTEMS  See HPI for further details. All other systems are negative.     PAST MEDICAL HISTORY   has a past medical history of Asthma, Benign prostatic hyperplasia with urinary frequency (2/25/2021), COPD (chronic obstructive pulmonary disease) ([facility]), Dyslipidemia (2/25/2021), and Vascular dementia with behavior disturbance ([facility]) (2/25/2021).    SOCIAL HISTORY  Social History     Tobacco Use   • Smoking status: Current Every Day Smoker     Packs/day: 0.25     Years: 20.00     Pack years: 5.00     Types: Cigarettes   • Smokeless tobacco: Never Used   • Tobacco comment: smoked for 10+ years, quit for about 5, started smoking again about 7-8 years ago   Substance and Sexual Activity   • Alcohol use: Yes     Alcohol/week: 0.0 oz     Comment: occ   • Drug use: No   • Sexual activity: Yes     Partners: Female     Comment: single retired        SURGICAL HISTORY   has a past surgical history that includes arthroscopy, knee (1978); hernia repair (child); and tonsillectomy (child).    CURRENT MEDICATIONS  Home Medications    **Home medications have not yet been reviewed for this encounter**         ALLERGIES  No Known Allergies    PHYSICAL EXAM  VITAL SIGNS: /63   Pulse 72   Temp 36.4 °C (97.6 °F) (Temporal)   Resp " "16   Ht 1.727 m (5' 8\")   Wt 69.4 kg (153 lb)   SpO2 99%   BMI 23.26 kg/m²   Pulse ox interpretation: I interpret this pulse ox as normal.  Constitutional: Alert in no apparent distress.  HENT: Normocephalic, atraumatic. Bilateral external ears normal, Nose normal.   Eyes: Pupils are equal and reactive, Conjunctiva normal.   Neck: Normal range of motion, Supple  Lymphatic: No lymphadenopathy noted.   Cardiovascular: Normal peripheral perfusion.  Thorax & Lungs: Nonlabored respirations.  Abdomen: Soft, non-distended, non-tender to palpation. No palpable or pulsatile masses. No peritoneal signs. No CVA tenderness.  Skin: Warm, Dry, No erythema, No rash.   Musculoskeletal: Pelvis stable without pain with AP compression.  Good range of motion both active and passively, in all major joints including right hip.  No shortening or rotation.  No major deformities noted.  Mild edema bilateral lower extremities, appears circumference of the right calf is slightly larger than the left.  No erythema, crepitus.  2+ DP.  Sensation intact to light touch distally.  Neurologic: Alert and oriented x3.  Moves her extremity spontaneously.  Answers questions appropriately.  Psychiatric: Affect normal, Judgment normal, Mood normal.       DIAGNOSTIC STUDIES / PROCEDURES  RADIOLOGY  US-EXTREMITY VENOUS LOWER UNILAT RIGHT   Final Result      DX-HIP-COMPLETE - UNILATERAL 2+ RIGHT   Final Result      1.  There is mild degenerative change in both hip joints with chronic appearing mild deformity of the lateral femoral head neck junctions.   2.  There is no acute fracture or malalignment.         FINDINGS:   Right lower extremity -.    No evidence of   deep venous thrombosis.       Shauna Becerra   (Electronically Signed)   Final Date:      14 March 2021                     10:11            Specimen Collected: 03/14/21 08:42 Last Resulted: 03/14/21 10:11          COURSE & MEDICAL DECISION MAKING  Nursing notes and vital signs were reviewed. " "(See chart for details)  The patients records were reviewed, history was obtained from the patient;     ED evaluation for right hip and proximal leg pain is unrevealing.  Suspect muscle cramp or musculoskeletal strain.  Pain was self-limiting and resolved prior to my evaluation.  Patient describes history of similar episode the day previous, also self-limiting.  He describes this as a \"cramp,\" which would be appropriate in this setting.  Physical exam is quite unremarkable.  CMS intact distally.  Range of motion without discomfort.  X-ray negative for fracture dislocation, there is really no history for trauma either, but x-rays were performed given patient's history of dementia.  Mild swelling in the lower extremities, no evidence for DVT.  No cutaneous changes, cellulitis.  No back pain or history for radicular symptoms previously.  He is ambulatory independently before discharge.  No history or clinical evidence to suggest cauda equina, epidural abscess or other spinal cord compression syndrome.    Patient is stable for discharge at this time, anticipatory guidance provided, continue home medications, activity as tolerated, close follow-up is encouraged, and strict ED return instructions have been detailed. Patient is agreeable to the disposition and plan.    Patient's blood pressure was elevated in the emergency department, and has been referred to primary care for close monitoring.    FINAL IMPRESSION  (M79.604) Acute pain of right lower extremity  (M25.551) Right hip pain      Electronically signed by: Faith Barrow D.O., 3/14/2021 10:17 AM      This dictation was created using voice recognition software. The accuracy of the dictation is limited to the abilities of the software. I expect there may be some errors of grammar and possibly content. The nursing notes were reviewed and certain aspects of this information were incorporated into this note.        "

## 2021-03-15 NOTE — TELEPHONE ENCOUNTER
Received phone msg from Kenny Gonzalez @ [facility] asking for info on pt. Kenny states that since our office ([facility]) does not have a designated personal asst she will assist pt with services needed. Sent Kenny pt info via Teams dayanara. Kenny's ext. [phone]. Kenny stated she will refer pt to a  for services.

## 2021-03-22 ENCOUNTER — APPOINTMENT (OUTPATIENT)
Dept: RADIOLOGY | Facility: MEDICAL CENTER | Age: 76
End: 2021-03-22
Attending: INTERNAL MEDICINE
Payer: MEDICARE

## 2021-03-22 ENCOUNTER — APPOINTMENT (OUTPATIENT)
Dept: RADIOLOGY | Facility: MEDICAL CENTER | Age: 76
End: 2021-03-22
Attending: EMERGENCY MEDICINE
Payer: MEDICARE

## 2021-03-22 ENCOUNTER — HOSPITAL ENCOUNTER (OUTPATIENT)
Facility: MEDICAL CENTER | Age: 76
End: 2021-03-31
Attending: EMERGENCY MEDICINE | Admitting: INTERNAL MEDICINE
Payer: MEDICARE

## 2021-03-22 DIAGNOSIS — M79.604 RIGHT LEG PAIN: ICD-10-CM

## 2021-03-22 DIAGNOSIS — R52 INTRACTABLE PAIN: ICD-10-CM
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
## 2021-03-22 PROBLEM — R74.8 ELEVATED CPK: Status: ACTIVE | Noted: 2021-03-22

## 2021-03-22 LAB
ANION GAP SERPL CALC-SCNC: 10 MMOL/L (ref 7–16)
BASOPHILS # BLD AUTO: 0.7 % (ref 0–1.8)
BASOPHILS # BLD: 0.06 K/UL (ref 0–0.12)
BUN SERPL-MCNC: 18 MG/DL (ref 8–22)
CALCIUM SERPL-MCNC: 9.4 MG/DL (ref 8.5–10.5)
CHLORIDE SERPL-SCNC: 105 MMOL/L (ref 96–112)
CK SERPL-CCNC: 410 U/L (ref 0–154)
CO2 SERPL-SCNC: 24 MMOL/L (ref 20–33)
CREAT SERPL-MCNC: 0.75 MG/DL (ref 0.5–1.4)
CRP SERPL HS-MCNC: <0.3 MG/DL (ref 0–0.75)
EOSINOPHIL # BLD AUTO: 0.21 K/UL (ref 0–0.51)
EOSINOPHIL NFR BLD: 2.5 % (ref 0–6.9)
ERYTHROCYTE [DISTWIDTH] IN BLOOD BY AUTOMATED COUNT: 46.5 FL (ref 35.9–50)
ERYTHROCYTE [SEDIMENTATION RATE] IN BLOOD BY WESTERGREN METHOD: 12 MM/HOUR (ref 0–20)
GLUCOSE SERPL-MCNC: 90 MG/DL (ref 65–99)
HCT VFR BLD AUTO: 42.7 % (ref 42–52)
HGB BLD-MCNC: 13.8 G/DL (ref 14–18)
IMM GRANULOCYTES # BLD AUTO: 0.04 K/UL (ref 0–0.11)
IMM GRANULOCYTES NFR BLD AUTO: 0.5 % (ref 0–0.9)
LACTATE BLD-SCNC: 1.4 MMOL/L (ref 0.5–2)
LYMPHOCYTES # BLD AUTO: 0.71 K/UL (ref 1–4.8)
LYMPHOCYTES NFR BLD: 8.4 % (ref 22–41)
MCH RBC QN AUTO: 29.9 PG (ref 27–33)
MCHC RBC AUTO-ENTMCNC: 32.3 G/DL (ref 33.7–35.3)
MCV RBC AUTO: 92.4 FL (ref 81.4–97.8)
MONOCYTES # BLD AUTO: 0.68 K/UL (ref 0–0.85)
MONOCYTES NFR BLD AUTO: 8 % (ref 0–13.4)
NEUTROPHILS # BLD AUTO: 6.76 K/UL (ref 1.82–7.42)
NEUTROPHILS NFR BLD: 79.9 % (ref 44–72)
NRBC # BLD AUTO: 0 K/UL
NRBC BLD-RTO: 0 /100 WBC
PLATELET # BLD AUTO: 308 K/UL (ref 164–446)
PMV BLD AUTO: 8.8 FL (ref 9–12.9)
POTASSIUM SERPL-SCNC: 4.1 MMOL/L (ref 3.6–5.5)
RBC # BLD AUTO: 4.62 M/UL (ref 4.7–6.1)
SARS-COV-2 RNA RESP QL NAA+PROBE: NOTDETECTED
SODIUM SERPL-SCNC: 139 MMOL/L (ref 135–145)
SPECIMEN SOURCE: NORMAL
WBC # BLD AUTO: 8.5 K/UL (ref 4.8–10.8)

## 2021-03-22 PROCEDURE — 700102 HCHG RX REV CODE 250 W/ 637 OVERRIDE(OP): Performed by: INTERNAL MEDICINE

## 2021-03-22 PROCEDURE — 70250 X-RAY EXAM OF SKULL: CPT

## 2021-03-22 PROCEDURE — G0378 HOSPITAL OBSERVATION PER HR: HCPCS

## 2021-03-22 PROCEDURE — 85652 RBC SED RATE AUTOMATED: CPT

## 2021-03-22 PROCEDURE — 80048 BASIC METABOLIC PNL TOTAL CA: CPT

## 2021-03-22 PROCEDURE — 86140 C-REACTIVE PROTEIN: CPT

## 2021-03-22 PROCEDURE — 85025 COMPLETE CBC W/AUTO DIFF WBC: CPT

## 2021-03-22 PROCEDURE — 73552 X-RAY EXAM OF FEMUR 2/>: CPT | Mod: RT

## 2021-03-22 PROCEDURE — 700111 HCHG RX REV CODE 636 W/ 250 OVERRIDE (IP): Performed by: INTERNAL MEDICINE

## 2021-03-22 PROCEDURE — 83605 ASSAY OF LACTIC ACID: CPT

## 2021-03-22 PROCEDURE — 82550 ASSAY OF CK (CPK): CPT

## 2021-03-22 PROCEDURE — A9270 NON-COVERED ITEM OR SERVICE: HCPCS | Performed by: INTERNAL MEDICINE

## 2021-03-22 PROCEDURE — 96372 THER/PROPH/DIAG INJ SC/IM: CPT

## 2021-03-22 PROCEDURE — 71045 X-RAY EXAM CHEST 1 VIEW: CPT

## 2021-03-22 PROCEDURE — 74018 RADEX ABDOMEN 1 VIEW: CPT

## 2021-03-22 PROCEDURE — 73590 X-RAY EXAM OF LOWER LEG: CPT | Mod: RT

## 2021-03-22 PROCEDURE — 99220 PR INITIAL OBSERVATION CARE,LEVL III: CPT | Performed by: INTERNAL MEDICINE

## 2021-03-22 PROCEDURE — 99285 EMERGENCY DEPT VISIT HI MDM: CPT

## 2021-03-22 PROCEDURE — U0005 INFEC AGEN DETEC AMPLI PROBE: HCPCS

## 2021-03-22 PROCEDURE — U0003 INFECTIOUS AGENT DETECTION BY NUCLEIC ACID (DNA OR RNA); SEVERE ACUTE RESPIRATORY SYNDROME CORONAVIRUS 2 (SARS-COV-2) (CORONAVIRUS DISEASE [COVID-19]), AMPLIFIED PROBE TECHNIQUE, MAKING USE OF HIGH THROUGHPUT TECHNOLOGIES AS DESCRIBED BY CMS-2020-01-R: HCPCS

## 2021-03-22 PROCEDURE — 36415 COLL VENOUS BLD VENIPUNCTURE: CPT

## 2021-03-22 RX ORDER — ONDANSETRON 2 MG/ML
4 INJECTION INTRAMUSCULAR; INTRAVENOUS EVERY 4 HOURS PRN
Status: DISCONTINUED | OUTPATIENT
Start: 2021-03-22 | End: 2021-03-31 | Stop reason: HOSPADM

## 2021-03-22 RX ORDER — CLOPIDOGREL BISULFATE 75 MG/1
75 TABLET ORAL DAILY
Status: DISCONTINUED | OUTPATIENT
Start: 2021-03-22 | End: 2021-03-27

## 2021-03-22 RX ORDER — QUETIAPINE FUMARATE 25 MG/1
25 TABLET, FILM COATED ORAL 2 TIMES DAILY
Status: DISCONTINUED | OUTPATIENT
Start: 2021-03-22 | End: 2021-03-25

## 2021-03-22 RX ORDER — GABAPENTIN 100 MG/1
100 CAPSULE ORAL 3 TIMES DAILY
Status: DISCONTINUED | OUTPATIENT
Start: 2021-03-22 | End: 2021-03-31 | Stop reason: HOSPADM

## 2021-03-22 RX ORDER — ACETAMINOPHEN 325 MG/1
650 TABLET ORAL EVERY 6 HOURS PRN
Status: DISCONTINUED | OUTPATIENT
Start: 2021-03-22 | End: 2021-03-31 | Stop reason: HOSPADM

## 2021-03-22 RX ORDER — ONDANSETRON 4 MG/1
4 TABLET, ORALLY DISINTEGRATING ORAL EVERY 4 HOURS PRN
Status: DISCONTINUED | OUTPATIENT
Start: 2021-03-22 | End: 2021-03-31 | Stop reason: HOSPADM

## 2021-03-22 RX ORDER — DONEPEZIL HYDROCHLORIDE 5 MG/1
10 TABLET, FILM COATED ORAL DAILY
Status: DISCONTINUED | OUTPATIENT
Start: 2021-03-22 | End: 2021-03-31 | Stop reason: HOSPADM

## 2021-03-22 RX ORDER — OXYCODONE HYDROCHLORIDE 10 MG/1
10 TABLET ORAL
Status: DISCONTINUED | OUTPATIENT
Start: 2021-03-22 | End: 2021-03-31 | Stop reason: HOSPADM

## 2021-03-22 RX ORDER — POLYETHYLENE GLYCOL 3350 17 G/17G
1 POWDER, FOR SOLUTION ORAL
Status: DISCONTINUED | OUTPATIENT
Start: 2021-03-22 | End: 2021-03-31 | Stop reason: HOSPADM

## 2021-03-22 RX ORDER — MORPHINE SULFATE 4 MG/ML
4 INJECTION, SOLUTION INTRAMUSCULAR; INTRAVENOUS
Status: DISCONTINUED | OUTPATIENT
Start: 2021-03-22 | End: 2021-03-31 | Stop reason: HOSPADM

## 2021-03-22 RX ORDER — BISACODYL 10 MG
10 SUPPOSITORY, RECTAL RECTAL
Status: DISCONTINUED | OUTPATIENT
Start: 2021-03-22 | End: 2021-03-31 | Stop reason: HOSPADM

## 2021-03-22 RX ORDER — OXYCODONE HYDROCHLORIDE 5 MG/1
5 TABLET ORAL
Status: DISCONTINUED | OUTPATIENT
Start: 2021-03-22 | End: 2021-03-31 | Stop reason: HOSPADM

## 2021-03-22 RX ORDER — BUDESONIDE AND FORMOTEROL FUMARATE DIHYDRATE 160; 4.5 UG/1; UG/1
2 AEROSOL RESPIRATORY (INHALATION)
Status: DISCONTINUED | OUTPATIENT
Start: 2021-03-22 | End: 2021-03-31 | Stop reason: HOSPADM

## 2021-03-22 RX ORDER — TAMSULOSIN HYDROCHLORIDE 0.4 MG/1
0.4 CAPSULE ORAL EVERY MORNING
Status: DISCONTINUED | OUTPATIENT
Start: 2021-03-23 | End: 2021-03-31 | Stop reason: HOSPADM

## 2021-03-22 RX ORDER — AMOXICILLIN 250 MG
2 CAPSULE ORAL 2 TIMES DAILY
Status: DISCONTINUED | OUTPATIENT
Start: 2021-03-22 | End: 2021-03-31 | Stop reason: HOSPADM

## 2021-03-22 RX ADMIN — QUETIAPINE FUMARATE 25 MG: 25 TABLET ORAL at 17:40

## 2021-03-22 RX ADMIN — DONEPEZIL HYDROCHLORIDE 10 MG: 5 TABLET, FILM COATED ORAL at 17:55

## 2021-03-22 RX ADMIN — DOCUSATE SODIUM 50 MG AND SENNOSIDES 8.6 MG 2 TABLET: 8.6; 5 TABLET, FILM COATED ORAL at 17:40

## 2021-03-22 RX ADMIN — BUDESONIDE AND FORMOTEROL FUMARATE DIHYDRATE 2 PUFF: 160; 4.5 AEROSOL RESPIRATORY (INHALATION) at 18:08

## 2021-03-22 RX ADMIN — CLOPIDOGREL BISULFATE 75 MG: 75 TABLET ORAL at 17:54

## 2021-03-22 RX ADMIN — GABAPENTIN 100 MG: 100 CAPSULE ORAL at 17:54

## 2021-03-22 RX ADMIN — ENOXAPARIN SODIUM 40 MG: 40 INJECTION SUBCUTANEOUS at 17:54

## 2021-03-22 ASSESSMENT — ENCOUNTER SYMPTOMS
SEIZURES: 0
NECK PAIN: 0
PALPITATIONS: 0
CHILLS: 0
WHEEZING: 0
SHORTNESS OF BREATH: 0
DIZZINESS: 0
DIAPHORESIS: 0
FEVER: 0
BLOOD IN STOOL: 0
DIARRHEA: 0
FOCAL WEAKNESS: 0
MYALGIAS: 1
FLANK PAIN: 0
COUGH: 0
BRUISES/BLEEDS EASILY: 0
HEADACHES: 0
NAUSEA: 0
SORE THROAT: 0
VOMITING: 0
BACK PAIN: 0
BLURRED VISION: 0
ABDOMINAL PAIN: 0
SPUTUM PRODUCTION: 0

## 2021-03-22 ASSESSMENT — COGNITIVE AND FUNCTIONAL STATUS - GENERAL
SUGGESTED CMS G CODE MODIFIER DAILY ACTIVITY: CL
STANDING UP FROM CHAIR USING ARMS: A LOT
TURNING FROM BACK TO SIDE WHILE IN FLAT BAD: A LITTLE
TOILETING: A LOT
PERSONAL GROOMING: A LOT
WALKING IN HOSPITAL ROOM: A LOT
MOBILITY SCORE: 14
DAILY ACTIVITIY SCORE: 13
EATING MEALS: A LITTLE
MOVING TO AND FROM BED TO CHAIR: A LITTLE
DRESSING REGULAR UPPER BODY CLOTHING: A LOT
HELP NEEDED FOR BATHING: A LOT
SUGGESTED CMS G CODE MODIFIER MOBILITY: CL
MOVING FROM LYING ON BACK TO SITTING ON SIDE OF FLAT BED: A LOT
CLIMB 3 TO 5 STEPS WITH RAILING: A LOT
DRESSING REGULAR LOWER BODY CLOTHING: A LOT

## 2021-03-22 ASSESSMENT — FIBROSIS 4 INDEX
FIB4 SCORE: 1.12
FIB4 SCORE: 1.22

## 2021-03-22 ASSESSMENT — LIFESTYLE VARIABLES
DOES PATIENT WANT TO STOP DRINKING: CANNOT ASSESS
DO YOU DRINK ALCOHOL: YES

## 2021-03-22 ASSESSMENT — COPD QUESTIONNAIRES
HAVE YOU SMOKED AT LEAST 100 CIGARETTES IN YOUR ENTIRE LIFE: YES
COPD SCREENING SCORE: 8
DO YOU EVER COUGH UP ANY MUCUS OR PHLEGM?: YES, A FEW DAYS A WEEK OR MONTH
DURING THE PAST 4 WEEKS HOW MUCH DID YOU FEEL SHORT OF BREATH: SOME OF THE TIME

## 2021-03-22 ASSESSMENT — PAIN DESCRIPTION - PAIN TYPE: TYPE: ACUTE PAIN

## 2021-03-22 ASSESSMENT — PATIENT HEALTH QUESTIONNAIRE - PHQ9
SUM OF ALL RESPONSES TO PHQ9 QUESTIONS 1 AND 2: 0
2. FEELING DOWN, DEPRESSED, IRRITABLE, OR HOPELESS: NOT AT ALL
1. LITTLE INTEREST OR PLEASURE IN DOING THINGS: NOT AT ALL

## 2021-03-22 NOTE — DISCHARGE PLANNING
Attempted to call daughter April no answer.  called from [facility] about home health informed her patient being admitted.

## 2021-03-22 NOTE — ED NOTES
Med Rec complete per pt's pharmacy  Pt unable to provide medication information, or last doses  Allergies Reviewed  No ABX in the last 14 days    Pt prescribed PLAVIX

## 2021-03-22 NOTE — H&P
"Hospital Medicine History & Physical Note    Date of Service  3/22/2021    Primary Care Physician  Ruby Henriquez M.D.    Consultants  None    Code Status  Full Code    Chief Complaint  Chief Complaint   Patient presents with   • T-5000 GLF     pt was BIB EMS after attempting to get out of bed and \"rolling off of it\". Reports pain in the entire RL leg (hip to ankle).  NO LOC. Hx of dementia. Pt appears to be extremely sensitive to touch and cold.        History of Presenting Illness  75 y.o. male with a past medical history of BPH, COPD, dyslipidemia, vascular dementia, asthma who presented 3/22/2021 with right leg pain for the past 3 days.  Patient reports sharp pain that starts from his right hip and radiates down to his right foot.  He reports he woke up with severe pain that was limiting his ability to ambulate and resulted in a fall.  He denied hitting his head or any loss of consciousness..  It is not exacerbated by movement.  He reports his skin is extremely sensitive to touch.  He denies any fevers, chills, focal weakness, numbness or tingling.  He denies any upper extremity pain.  In the ER he was noted to have an elevated CPK.  The patient does take atorvastatin.  He was admitted on 3/14 for similar symptoms and underwent x-ray of his right hip that was negative for fracture and ultrasound right lower extremity that was negative for DVT.    Review of Systems  Review of Systems   Constitutional: Negative for chills, diaphoresis and fever.   HENT: Negative for hearing loss and sore throat.    Eyes: Negative for blurred vision.   Respiratory: Negative for cough, sputum production, shortness of breath and wheezing.    Cardiovascular: Negative for chest pain, palpitations and leg swelling.   Gastrointestinal: Negative for abdominal pain, blood in stool, diarrhea, nausea and vomiting.   Genitourinary: Negative for dysuria, flank pain and urgency.   Musculoskeletal: Positive for myalgias. Negative for back pain, " joint pain and neck pain.   Skin: Negative for rash.   Neurological: Negative for dizziness, focal weakness, seizures and headaches.   Endo/Heme/Allergies: Does not bruise/bleed easily.   Psychiatric/Behavioral: Negative for suicidal ideas.   All other systems reviewed and are negative.      Past Medical History   has a past medical history of Asthma, Benign prostatic hyperplasia with urinary frequency (2/25/2021), COPD (chronic obstructive pulmonary disease) ([facility]), Dyslipidemia (2/25/2021), and Vascular dementia with behavior disturbance ([facility]) (2/25/2021).    Surgical History   has a past surgical history that includes arthroscopy, knee (1978); hernia repair (child); and tonsillectomy (child).     Family History  No pertinent family history    Social History   reports that he has been smoking cigarettes. He has a 5.00 pack-year smoking history. He has never used smokeless tobacco. He reports current alcohol use. He reports that he does not use drugs.    Allergies  No Known Allergies    Medications  Prior to Admission Medications   Prescriptions Last Dose Informant Patient Reported? Taking?   Fluticasone Furoate-Vilanterol (BREO ELLIPTA) 100-25 MCG/INH AEROSOL POWDER, BREATH ACTIVATED   No No   Sig: Inhale 1 Puff every day.   QUEtiapine (SEROQUEL) 25 MG Tab   No No   Sig: Take 1 tablet by mouth 2 times a day.   alfuzosin (UROXATRAL) 10 MG SR tablet   No No   Sig: Take 1 tablet by mouth every day.   atorvastatin (LIPITOR) 80 MG tablet   No No   Sig: Take 1 tablet by mouth every evening.   clopidogrel (PLAVIX) 75 MG Tab   No No   Sig: Take 1 tablet by mouth every day.   donepezil (ARICEPT) 10 MG tablet   No No   Sig: Take 1 tablet by mouth every day.   ipratropium-albuterol (COMBIVENT RESPIMAT)  MCG/ACT Aero Soln   No No   Sig: Inhale 2 Puffs every 6 hours as needed.      Facility-Administered Medications: None       Physical Exam  Temp:  [36.3 °C (97.4 °F)] 36.3 °C (97.4 °F)  Pulse:  [64] 64  Resp:  [14]  14  BP: (111)/(56) 111/56  SpO2:  [96 %] 96 %    Physical Exam  Vitals and nursing note reviewed.   Constitutional:       General: He is not in acute distress.     Appearance: Normal appearance.   HENT:      Head: Normocephalic and atraumatic.      Nose: Nose normal.      Mouth/Throat:      Mouth: Mucous membranes are moist.   Eyes:      Extraocular Movements: Extraocular movements intact.      Conjunctiva/sclera: Conjunctivae normal.      Pupils: Pupils are equal, round, and reactive to light.   Cardiovascular:      Rate and Rhythm: Normal rate and regular rhythm.      Pulses: Normal pulses.      Heart sounds: Normal heart sounds.   Pulmonary:      Effort: Pulmonary effort is normal. No respiratory distress.      Breath sounds: Normal breath sounds. No wheezing, rhonchi or rales.   Abdominal:      General: Bowel sounds are normal. There is no distension.      Palpations: Abdomen is soft.      Tenderness: There is no abdominal tenderness.   Musculoskeletal:         General: No swelling or tenderness. Normal range of motion.      Cervical back: Normal range of motion and neck supple.   Lymphadenopathy:      Cervical: No cervical adenopathy.   Skin:     General: Skin is warm.      Coloration: Skin is not jaundiced.      Findings: No rash.   Neurological:      General: No focal deficit present.      Mental Status: He is alert and oriented to person, place, and time.      Cranial Nerves: No cranial nerve deficit.      Motor: No weakness.      Comments: 5/5 strength in all 4 extremities   Psychiatric:         Mood and Affect: Affect is angry.         Behavior: Behavior is aggressive.         Laboratory:  Recent Labs     03/22/21  1125   WBC 8.5   RBC 4.62*   HEMOGLOBIN 13.8*   HEMATOCRIT 42.7   MCV 92.4   MCH 29.9   MCHC 32.3*   RDW 46.5   PLATELETCT 308   MPV 8.8*     Recent Labs     03/22/21  1125   SODIUM 139   POTASSIUM 4.1   CHLORIDE 105   CO2 24   GLUCOSE 90   BUN 18   CREATININE 0.75   CALCIUM 9.4     Recent Labs      03/22/21  1125   GLUCOSE 90         No results for input(s): NTPROBNP in the last 72 hours.      No results for input(s): TROPONINT in the last 72 hours.    Imaging:  DX-TIBIA AND FIBULA RIGHT   Final Result      No fracture of RIGHT lower leg.      DX-FEMUR-2+ RIGHT   Final Result      No RIGHT femur fracture.      MR-LUMBAR SPINE-W/O    (Results Pending)         Assessment/Plan:  I anticipate this patient is appropriate for observation status at this time.    * Acute pain of right lower extremity- (present on admission)  Assessment & Plan  No evidence of fracture or DVT on imaging  Check ESR, CRP  Check MRI lumbar spine for further evaluation  Pain control with Tylenol, ibuprofen, gabapentin and oxycodone.  IV morphine as needed for breakthrough.  Monitor respiratory status closely        Elevated CPK- (present on admission)  Assessment & Plan  Secondary to trauma versus statin  Hold statin for now  Trend CPK    Benign prostatic hyperplasia with urinary frequency- (present on admission)  Assessment & Plan  Continue Flomax    Vascular dementia with behavior disturbance (HCC)- (present on admission)  Assessment & Plan  Continue Aricept and Seroquel    COPD (chronic obstructive pulmonary disease) (HCC)- (present on admission)  Assessment & Plan  Not in acute exacerbation  Continue home regimen of inhalers  RT protocol

## 2021-03-22 NOTE — ED PROVIDER NOTES
"ED Provider Note    Scribed for Drew Shaw M.D. by Pieter Amaya. 3/22/2021  10:11 AM    Primary care provider: Ruby Henriquez M.D.  Means of arrival: [place]  History obtained from: Patient  History limited by: None    CHIEF COMPLAINT  Chief Complaint   Patient presents with    T-5000 GLF     pt was BIB EMS after attempting to get out of bed and \"rolling off of it\". Reports pain in the entire RL leg (hip to ankle).  NO LOC. Hx of dementia. Pt appears to be extremely sensitive to touch and cold.        HPI  Remi Matt is a 75 y.o. male who presents to the Emergency Department for evaluation of acute right leg pain onset this morning. He woke up this morning with moderate right leg pain from hip to toes that caused difficulty moving. When he attempted to get out of bed on his own, he rolled off the edge because he could not move his right leg to catch himself. He denies hitting his head or any loss of consciousness. He called EMS who transported him to the ED. He was additionally seen on 03/14/21 for right leg pain and his radiology was unremarkable. He was discharged home without any prescriptions. The patient reports associated difficulty ambulating. Negative for loss of consciousness, headache, nausea, vomiting, syncope, fever, chills, shortness of breath, or upper extremity pain. He has not taken any medication for his pain at home. The patient has a history of dementia, BPH, asthma, and COPD.     REVIEW OF SYSTEMS  Pertinent positives include right leg pain and difficulty ambulating.   Pertinent negatives include no loss of consciousness, headache, nausea, vomiting, syncope, fever, chills, shortness of breath, or upper extremity pain.    All other systems reviewed and negative. See HPI for further details.     PAST MEDICAL HISTORY   has a past medical history of Asthma, Benign prostatic hyperplasia with urinary frequency (2/25/2021), COPD (chronic obstructive pulmonary disease) ([facility]), " "Dyslipidemia (2/25/2021), and Vascular dementia with behavior disturbance (HCC) (2/25/2021).    SURGICAL HISTORY   has a past surgical history that includes arthroscopy, knee (1978); hernia repair (child); and tonsillectomy (child).    SOCIAL HISTORY  Social History     Tobacco Use    Smoking status: Current Every Day Smoker     Packs/day: 0.25     Years: 20.00     Pack years: 5.00     Types: Cigarettes    Smokeless tobacco: Never Used    Tobacco comment: smoked for 10+ years, quit for about 5, started smoking again about 7-8 years ago   Substance Use Topics    Alcohol use: Yes     Alcohol/week: 0.0 oz     Comment: occ    Drug use: No      Social History     Substance and Sexual Activity   Drug Use No       FAMILY HISTORY  Family History   Problem Relation Age of Onset    Cancer Neg Hx     Diabetes Neg Hx     Heart Disease Neg Hx     Stroke Neg Hx     Heart Attack Neg Hx        CURRENT MEDICATIONS  Home Medications       Reviewed by Gaston Villagomez (Pharmacy Tech) on 03/22/21 at 1334  Med List Status: Complete     Medication Last Dose Status   alfuzosin (UROXATRAL) 10 MG SR tablet UNK Active   atorvastatin (LIPITOR) 80 MG tablet UNK Active   clopidogrel (PLAVIX) 75 MG Tab UNK Active   donepezil (ARICEPT) 10 MG tablet UNK Active   Fluticasone Furoate-Vilanterol (BREO ELLIPTA) 100-25 MCG/INH AEROSOL POWDER, BREATH ACTIVATED UNK Active   ipratropium-albuterol (COMBIVENT RESPIMAT)  MCG/ACT Aero Soln UNK Active   QUEtiapine (SEROQUEL) 25 MG Tab UNK Active                    ALLERGIES  No Known Allergies    PHYSICAL EXAM  VITAL SIGNS: /56   Pulse 64   Temp 36.3 °C (97.4 °F) (Temporal)   Resp 14   Ht 1.753 m (5' 9\")   Wt 69.4 kg (153 lb)   SpO2 96%   BMI 22.59 kg/m²     Nursing note and vitals reviewed.  Constitutional: Elderly, Mild dementia, Well-developed and well-nourished. No distress.   HENT: Head is normocephalic and atraumatic. Oropharynx is clear and moist without exudate or erythema. "   Eyes: Pupils are equal, round, and reactive to light. Conjunctiva are normal.   Cardiovascular: Normal rate and regular rhythm. No murmur heard. Normal radial pulses.  Pulmonary/Chest: Breath sounds normal. No wheezes or rales.   Abdominal: Soft and non-tender. No distention    Musculoskeletal: Exquisite tenderness with even very light touch to the mid thigh to the toes on right leg, palpable dorsalis pedis pulse, leg warm and well perfused with no significant erythema.   Neurological: Mild dementia, Awake, alert and oriented to person, place, and time. No focal deficits noted.  Skin: Skin is warm and dry. No rash.   Psychiatric: Normal mood and affect. Appropriate for clinical situation.    DIAGNOSTIC STUDIES / PROCEDURES    LABS  Results for orders placed or performed during the hospital encounter of 03/22/21   CBC WITH DIFFERENTIAL   Result Value Ref Range    WBC 8.5 4.8 - 10.8 K/uL    RBC 4.62 (L) 4.70 - 6.10 M/uL    Hemoglobin 13.8 (L) 14.0 - 18.0 g/dL    Hematocrit 42.7 42.0 - 52.0 %    MCV 92.4 81.4 - 97.8 fL    MCH 29.9 27.0 - 33.0 pg    MCHC 32.3 (L) 33.7 - 35.3 g/dL    RDW 46.5 35.9 - 50.0 fL    Platelet Count 308 164 - 446 K/uL    MPV 8.8 (L) 9.0 - 12.9 fL    Neutrophils-Polys 79.90 (H) 44.00 - 72.00 %    Lymphocytes 8.40 (L) 22.00 - 41.00 %    Monocytes 8.00 0.00 - 13.40 %    Eosinophils 2.50 0.00 - 6.90 %    Basophils 0.70 0.00 - 1.80 %    Immature Granulocytes 0.50 0.00 - 0.90 %    Nucleated RBC 0.00 /100 WBC    Neutrophils (Absolute) 6.76 1.82 - 7.42 K/uL    Lymphs (Absolute) 0.71 (L) 1.00 - 4.80 K/uL    Monos (Absolute) 0.68 0.00 - 0.85 K/uL    Eos (Absolute) 0.21 0.00 - 0.51 K/uL    Baso (Absolute) 0.06 0.00 - 0.12 K/uL    Immature Granulocytes (abs) 0.04 0.00 - 0.11 K/uL    NRBC (Absolute) 0.00 K/uL   BASIC METABOLIC PANEL   Result Value Ref Range    Sodium 139 135 - 145 mmol/L    Potassium 4.1 3.6 - 5.5 mmol/L    Chloride 105 96 - 112 mmol/L    Co2 24 20 - 33 mmol/L    Glucose 90 65 - 99 mg/dL     Bun 18 8 - 22 mg/dL    Creatinine 0.75 0.50 - 1.40 mg/dL    Calcium 9.4 8.5 - 10.5 mg/dL    Anion Gap 10.0 7.0 - 16.0   Sed Rate   Result Value Ref Range    Sed Rate Westergren 12 0 - 20 mm/hour   CREATINE KINASE   Result Value Ref Range    CPK Total 410 (H) 0 - 154 U/L   LACTIC ACID   Result Value Ref Range    Lactic Acid 1.4 0.5 - 2.0 mmol/L   ESTIMATED GFR   Result Value Ref Range    GFR If African American >60 >60 mL/min/1.73 m 2    GFR If Non African American >60 >60 mL/min/1.73 m 2   SARS-CoV-2 PCR (24 hour In-House): Collect NP swab in VTM    Specimen: Nasopharyngeal; Respirate   Result Value Ref Range    SARS-CoV-2 Source NP Swab    CRP QUANTITIVE (NON-CARDIAC)   Result Value Ref Range    Stat C-Reactive Protein <0.30 0.00 - 0.75 mg/dL     All labs reviewed by me.    RADIOLOGY  DX-TIBIA AND FIBULA RIGHT   Final Result      No fracture of RIGHT lower leg.      DX-FEMUR-2+ RIGHT   Final Result      No RIGHT femur fracture.      MR-LUMBAR SPINE-W/O    (Results Pending)     The radiologist's interpretation of all radiological studies have been reviewed by me.    COURSE & MEDICAL DECISION MAKING  Nursing notes, VS, PMSFHx reviewed in chart.     Review of past medical records shows the patient was seen 8 days ago for right leg pain. His ultrasound and x-ray of his right leg were both unrevealing.    10:11 AM - Patient seen and examined at bedside.  Ordered DX Right Femur, DX Right Tibia and Fibula, CBC with Differential, Sed Rate, and Creatine Kinase to evaluate his symptoms. The differential diagnoses include but are not limited to: radiculopathy, fracture, or rhabdomyolysis. He has significant pain with light palpation and so radiology is indicated. Discussed plan to assess blood composition, heart function, and skeletal muscle breakdown. I will await lab and radiology results before determining whether interventions are necessary. The patient is agreeable and understanding of my plan of care.     11:25 PM -  Ordered for Lactic Acid to further evaluate the patient.     12:37 PM - Paged Hospitalist.     12:46 PM - I spoke with Dr. Bosch (Hospitalist) and updated him on the condition of the patient. He agrees to admit the patient for further evaluation and treatment.     1:06 PM - Ordered for SARS-CoV-2 PCR in preparation for admission.    1:15 PM - Upon repeat evaluation, the patient is resting in bed with stable vitals. I updated him on his lab and radiology results which show no acute fracture. His CPK is elevated which indicative of skeletal muscle injury possible statin myopathy, however, fairly mild elevation. He is resting in bed with no new complaints at this time. I informed him of my plan for admission which he is agreeable to.      DISPOSITION:  Patient will be hospitalized by Dr. Bosch (Hospitalist) in guarded condition.    FINAL IMPRESSION  1. Intractable pain    2. Right leg pain    3. Fall, initial encounter          IPieter (Scribjasmin), am scribing for, and in the presence of, Drew hSaw M.D..    Electronically signed by: Pieter Amaya (Scribe), 3/22/2021    IDrew M.D. personally performed the services described in this documentation, as scribed by Pieter Amaya in my presence, and it is both accurate and complete.    C.     The note accurately reflects work and decisions made by me.  Drew Shaw M.D.  3/22/2021  5:08 PM

## 2021-03-22 NOTE — ED TRIAGE NOTES
"..  Chief Complaint   Patient presents with   • T-5000 GLF     pt was BIB EMS after attempting to get out of bed and \"rolling off of it\". Reports pain in the entire RL leg (hip to ankle).  NO LOC. Hx of dementia. Pt appears to be extremely sensitive to touch and cold.        .  Vitals:    03/22/21 1010   BP: 111/56   Pulse: 64   Resp: 14   Temp: 36.3 °C (97.4 °F)   SpO2: 96%           "

## 2021-03-23 ENCOUNTER — APPOINTMENT (OUTPATIENT)
Dept: RADIOLOGY | Facility: MEDICAL CENTER | Age: 76
End: 2021-03-23
Attending: INTERNAL MEDICINE
Payer: MEDICARE

## 2021-03-23 LAB
ANION GAP SERPL CALC-SCNC: 8 MMOL/L (ref 7–16)
BASOPHILS # BLD AUTO: 0.8 % (ref 0–1.8)
BASOPHILS # BLD: 0.05 K/UL (ref 0–0.12)
BUN SERPL-MCNC: 17 MG/DL (ref 8–22)
CALCIUM SERPL-MCNC: 9.6 MG/DL (ref 8.5–10.5)
CHLORIDE SERPL-SCNC: 110 MMOL/L (ref 96–112)
CO2 SERPL-SCNC: 23 MMOL/L (ref 20–33)
CREAT SERPL-MCNC: 0.83 MG/DL (ref 0.5–1.4)
EOSINOPHIL # BLD AUTO: 0.22 K/UL (ref 0–0.51)
EOSINOPHIL NFR BLD: 3.5 % (ref 0–6.9)
ERYTHROCYTE [DISTWIDTH] IN BLOOD BY AUTOMATED COUNT: 46.2 FL (ref 35.9–50)
GLUCOSE SERPL-MCNC: 94 MG/DL (ref 65–99)
HCT VFR BLD AUTO: 39.6 % (ref 42–52)
HGB BLD-MCNC: 13.2 G/DL (ref 14–18)
IMM GRANULOCYTES # BLD AUTO: 0.03 K/UL (ref 0–0.11)
IMM GRANULOCYTES NFR BLD AUTO: 0.5 % (ref 0–0.9)
LYMPHOCYTES # BLD AUTO: 0.78 K/UL (ref 1–4.8)
LYMPHOCYTES NFR BLD: 12.2 % (ref 22–41)
MCH RBC QN AUTO: 30.4 PG (ref 27–33)
MCHC RBC AUTO-ENTMCNC: 33.3 G/DL (ref 33.7–35.3)
MCV RBC AUTO: 91.2 FL (ref 81.4–97.8)
MONOCYTES # BLD AUTO: 0.62 K/UL (ref 0–0.85)
MONOCYTES NFR BLD AUTO: 9.7 % (ref 0–13.4)
NEUTROPHILS # BLD AUTO: 4.67 K/UL (ref 1.82–7.42)
NEUTROPHILS NFR BLD: 73.3 % (ref 44–72)
NRBC # BLD AUTO: 0 K/UL
NRBC BLD-RTO: 0 /100 WBC
PLATELET # BLD AUTO: 314 K/UL (ref 164–446)
PMV BLD AUTO: 8.9 FL (ref 9–12.9)
POTASSIUM SERPL-SCNC: 4.3 MMOL/L (ref 3.6–5.5)
RBC # BLD AUTO: 4.34 M/UL (ref 4.7–6.1)
SODIUM SERPL-SCNC: 141 MMOL/L (ref 135–145)
WBC # BLD AUTO: 6.4 K/UL (ref 4.8–10.8)

## 2021-03-23 PROCEDURE — 700102 HCHG RX REV CODE 250 W/ 637 OVERRIDE(OP): Performed by: INTERNAL MEDICINE

## 2021-03-23 PROCEDURE — 94760 N-INVAS EAR/PLS OXIMETRY 1: CPT

## 2021-03-23 PROCEDURE — G0378 HOSPITAL OBSERVATION PER HR: HCPCS

## 2021-03-23 PROCEDURE — 80048 BASIC METABOLIC PNL TOTAL CA: CPT

## 2021-03-23 PROCEDURE — 85025 COMPLETE CBC W/AUTO DIFF WBC: CPT

## 2021-03-23 PROCEDURE — A9270 NON-COVERED ITEM OR SERVICE: HCPCS | Performed by: INTERNAL MEDICINE

## 2021-03-23 PROCEDURE — 36415 COLL VENOUS BLD VENIPUNCTURE: CPT

## 2021-03-23 PROCEDURE — 99225 PR SUBSEQUENT OBSERVATION CARE,LEVEL II: CPT | Performed by: STUDENT IN AN ORGANIZED HEALTH CARE EDUCATION/TRAINING PROGRAM

## 2021-03-23 PROCEDURE — 94640 AIRWAY INHALATION TREATMENT: CPT

## 2021-03-23 RX ADMIN — GABAPENTIN 100 MG: 100 CAPSULE ORAL at 12:11

## 2021-03-23 RX ADMIN — QUETIAPINE FUMARATE 25 MG: 25 TABLET ORAL at 05:05

## 2021-03-23 RX ADMIN — GABAPENTIN 100 MG: 100 CAPSULE ORAL at 05:05

## 2021-03-23 RX ADMIN — GABAPENTIN 100 MG: 100 CAPSULE ORAL at 18:35

## 2021-03-23 RX ADMIN — TAMSULOSIN HYDROCHLORIDE 0.4 MG: 0.4 CAPSULE ORAL at 05:05

## 2021-03-23 RX ADMIN — DOCUSATE SODIUM 50 MG AND SENNOSIDES 8.6 MG 2 TABLET: 8.6; 5 TABLET, FILM COATED ORAL at 18:35

## 2021-03-23 RX ADMIN — DOCUSATE SODIUM 50 MG AND SENNOSIDES 8.6 MG 2 TABLET: 8.6; 5 TABLET, FILM COATED ORAL at 05:06

## 2021-03-23 RX ADMIN — BUDESONIDE AND FORMOTEROL FUMARATE DIHYDRATE 2 PUFF: 160; 4.5 AEROSOL RESPIRATORY (INHALATION) at 20:45

## 2021-03-23 RX ADMIN — DONEPEZIL HYDROCHLORIDE 10 MG: 5 TABLET, FILM COATED ORAL at 05:05

## 2021-03-23 RX ADMIN — QUETIAPINE FUMARATE 25 MG: 25 TABLET ORAL at 18:35

## 2021-03-23 RX ADMIN — CLOPIDOGREL BISULFATE 75 MG: 75 TABLET ORAL at 05:05

## 2021-03-23 RX ADMIN — BUDESONIDE AND FORMOTEROL FUMARATE DIHYDRATE 2 PUFF: 160; 4.5 AEROSOL RESPIRATORY (INHALATION) at 10:10

## 2021-03-23 ASSESSMENT — ENCOUNTER SYMPTOMS
BLURRED VISION: 0
NAUSEA: 0
CONSTIPATION: 0
FOCAL WEAKNESS: 0
DEPRESSION: 0
SENSORY CHANGE: 0
PALPITATIONS: 0
FLANK PAIN: 0
TREMORS: 0
CLAUDICATION: 0
POLYDIPSIA: 0
EYE REDNESS: 0
DIZZINESS: 0
SORE THROAT: 0
SPEECH CHANGE: 0
EYE PAIN: 0
MYALGIAS: 1
BRUISES/BLEEDS EASILY: 0
NECK PAIN: 0
SHORTNESS OF BREATH: 0
BACK PAIN: 0
SPUTUM PRODUCTION: 0
HEADACHES: 0
DIARRHEA: 0
DIAPHORESIS: 0
ABDOMINAL PAIN: 0
HEMOPTYSIS: 0
LOSS OF CONSCIOUSNESS: 0
FEVER: 0
CHILLS: 0
EYE DISCHARGE: 0
VOMITING: 0
COUGH: 0

## 2021-03-23 ASSESSMENT — PAIN DESCRIPTION - PAIN TYPE: TYPE: ACUTE PAIN

## 2021-03-23 NOTE — PROGRESS NOTES
[facility] Medicine Daily Progress Note    Date of Service  3/23/2021    Chief Complaint  75 y.o. male admitted 3/22/2021 with right leg pain    Hospital Course  75 y.o. male with a past medical history of BPH, COPD, dyslipidemia, vascular dementia, asthma who presented 3/22/2021 with right leg pain for the past 3 days.  Patient reports sharp pain that starts from his right hip and radiates down to his right foot.  He reports he woke up with severe pain that was limiting his ability to ambulate and resulted in a fall.  He denied hitting his head or any loss of consciousness..  It is not exacerbated by movement.  He reports his skin is extremely sensitive to touch.  He denies any fevers, chills, focal weakness, numbness or tingling.  He denies any upper extremity pain.  In the ER he was noted to have an elevated CPK.  The patient does take atorvastatin.  He was admitted on 3/14 for similar symptoms and underwent x-ray of his right hip that was negative for fracture and ultrasound right lower extremity that was negative for DVT.      Interval Problem Update  I evaluated and examined the patient at the bedside.  Labs and imaging were reviewed. Care plan was discussed with the patient and bedside nurse.    Pt reported the RLE pain has been much more improved. No focal weakness. Lumbar MRI pending today.     ESR, CRP normal, Ordered b12, TSH    PT OT ordered    Consultants/Specialty      Code Status  Full Code    Disposition  Pt ot    Review of Systems  Review of Systems   Constitutional: Positive for malaise/fatigue. Negative for chills, diaphoresis and fever.   HENT: Negative for sore throat.    Eyes: Negative for blurred vision, pain, discharge and redness.   Respiratory: Negative for cough, hemoptysis, sputum production and shortness of breath.    Cardiovascular: Negative for chest pain, palpitations, claudication and leg swelling.   Gastrointestinal: Negative for abdominal pain, constipation, diarrhea, nausea and  vomiting.   Genitourinary: Negative for dysuria, flank pain, frequency, hematuria and urgency.   Musculoskeletal: Positive for myalgias. Negative for back pain, joint pain and neck pain.        R leg pain   Skin: Negative for itching.   Neurological: Negative for dizziness, tremors, sensory change, speech change, focal weakness, loss of consciousness and headaches.   Endo/Heme/Allergies: Negative for polydipsia. Does not bruise/bleed easily.   Psychiatric/Behavioral: Negative for depression.        Physical Exam  Temp:  [36.2 °C (97.2 °F)-36.4 °C (97.6 °F)] 36.3 °C (97.3 °F)  Pulse:  [62-80] 65  Resp:  [16-20] 16  BP: (105-122)/(57-60) 120/59  SpO2:  [95 %-99 %] 95 %    Physical Exam  Constitutional:       General: He is not in acute distress.     Appearance: Normal appearance. He is not diaphoretic.   HENT:      Head: Normocephalic and atraumatic.      Nose: Nose normal.      Mouth/Throat:      Pharynx: Oropharynx is clear. No oropharyngeal exudate or posterior oropharyngeal erythema.   Eyes:      General:         Right eye: No discharge.         Left eye: No discharge.   Cardiovascular:      Rate and Rhythm: Normal rate and regular rhythm.      Pulses: Normal pulses.      Heart sounds: Normal heart sounds. No gallop.    Pulmonary:      Effort: Pulmonary effort is normal. No respiratory distress.      Breath sounds: Normal breath sounds. No stridor. No wheezing, rhonchi or rales.   Chest:      Chest wall: No tenderness.   Abdominal:      General: Abdomen is flat. Bowel sounds are normal. There is no distension.      Palpations: Abdomen is soft.      Tenderness: There is no abdominal tenderness. There is no guarding or rebound.   Musculoskeletal:         General: No swelling, tenderness, deformity or signs of injury. Normal range of motion.      Cervical back: No rigidity. No muscular tenderness.      Right lower leg: No edema.      Left lower leg: No edema.   Neurological:      General: No focal deficit present.       Mental Status: He is alert and oriented to person, place, and time. Mental status is at baseline.      Sensory: No sensory deficit.      Motor: No weakness.      Gait: Gait normal.   Psychiatric:         Mood and Affect: Mood normal.         Fluids    Intake/Output Summary (Last 24 hours) at 3/23/2021 1422  Last data filed at 3/23/2021 0400  Gross per 24 hour   Intake 100 ml   Output 200 ml   Net -100 ml       Laboratory  Recent Labs     03/22/21  1125 03/23/21  0346   WBC 8.5 6.4   RBC 4.62* 4.34*   HEMOGLOBIN 13.8* 13.2*   HEMATOCRIT 42.7 39.6*   MCV 92.4 91.2   MCH 29.9 30.4   MCHC 32.3* 33.3*   RDW 46.5 46.2   PLATELETCT 308 314   MPV 8.8* 8.9*     Recent Labs     03/22/21  1125 03/23/21  0346   SODIUM 139 141   POTASSIUM 4.1 4.3   CHLORIDE 105 110   CO2 24 23   GLUCOSE 90 94   BUN 18 17   CREATININE 0.75 0.83   CALCIUM 9.4 9.6                   Imaging  DX-SKULL-LIMITED 3-   Final Result      There is metallic denture.   No radiopaque foreign body identified.      DX-CHEST-PORTABLE (1 VIEW)   Final Result         1. No radiopaque foreign body identified.      [id number] VIEW   Final Result         No radiopaque foreign body identified.      DX-TIBIA AND FIBULA RIGHT   Final Result      No fracture of RIGHT lower leg.      DX-FEMUR-2+ RIGHT   Final Result      No RIGHT femur fracture.      MR-LUMBAR SPINE-W/O    (Results Pending)        Assessment/Plan  * Acute pain of right lower extremity- (present on admission)  Assessment & Plan  No evidence of fracture or DVT on imaging  ESR, CRP normal    Ordered b12, TSH  Check MRI lumbar spine for further evaluation  Pain control with Tylenol, ibuprofen, gabapentin and oxycodone.  IV morphine as needed for breakthrough.  Monitor respiratory status closely        Elevated CPK- (present on admission)  Assessment & Plan  Secondary to trauma versus statin  Hold statin for now  Trend CPK    Benign prostatic hyperplasia with urinary frequency- (present on  admission)  Assessment & Plan  Continue Flomax    Vascular dementia with behavior disturbance (HCC)- (present on admission)  Assessment & Plan  Continue Aricept and Seroquel    COPD (chronic obstructive pulmonary disease) (HCC)- (present on admission)  Assessment & Plan  Not in acute exacerbation  Continue home regimen of inhalers  RT protocol       VTE prophylaxis: lovenox

## 2021-03-23 NOTE — CARE PLAN
Problem: Fluid Volume:  Goal: Will maintain balanced intake and output  Outcome: PROGRESSING AS EXPECTED     Problem: Safety  Goal: Will remain free from falls  Outcome: PROGRESSING AS EXPECTED     Problem: Infection  Goal: Will remain free from infection  Outcome: PROGRESSING AS EXPECTED     Problem: Skin Integrity  Goal: Risk for impaired skin integrity will decrease  Outcome: PROGRESSING AS EXPECTED     Patient will not experience fall during this shift.Bed alarm on, rounding every hour, call light within reach.

## 2021-03-23 NOTE — CARE PLAN
Pt pleasant but becomes confused as to why he is in he hospital and tries to get out of bed when he is a 2 assist. Educated pt on use of the call light before getting up. Bed alarm active

## 2021-03-24 ENCOUNTER — APPOINTMENT (OUTPATIENT)
Dept: RADIOLOGY | Facility: MEDICAL CENTER | Age: 76
End: 2021-03-24
Attending: INTERNAL MEDICINE
Payer: MEDICARE

## 2021-03-24 ENCOUNTER — DOCUMENTATION (OUTPATIENT)
Dept: RESPIRATORY THERAPY | Facility: MEDICAL CENTER | Age: 76
End: 2021-03-24

## 2021-03-24 LAB
ANION GAP SERPL CALC-SCNC: 9 MMOL/L (ref 7–16)
BASOPHILS # BLD AUTO: 0.6 % (ref 0–1.8)
BASOPHILS # BLD: 0.04 K/UL (ref 0–0.12)
BUN SERPL-MCNC: 16 MG/DL (ref 8–22)
CALCIUM SERPL-MCNC: 9.1 MG/DL (ref 8.5–10.5)
CHLORIDE SERPL-SCNC: 105 MMOL/L (ref 96–112)
CO2 SERPL-SCNC: 25 MMOL/L (ref 20–33)
CREAT SERPL-MCNC: 0.81 MG/DL (ref 0.5–1.4)
EOSINOPHIL # BLD AUTO: 0.2 K/UL (ref 0–0.51)
EOSINOPHIL NFR BLD: 2.9 % (ref 0–6.9)
ERYTHROCYTE [DISTWIDTH] IN BLOOD BY AUTOMATED COUNT: 45.6 FL (ref 35.9–50)
GLUCOSE SERPL-MCNC: 105 MG/DL (ref 65–99)
HCT VFR BLD AUTO: 41 % (ref 42–52)
HGB BLD-MCNC: 13.1 G/DL (ref 14–18)
IMM GRANULOCYTES # BLD AUTO: 0.05 K/UL (ref 0–0.11)
IMM GRANULOCYTES NFR BLD AUTO: 0.7 % (ref 0–0.9)
LYMPHOCYTES # BLD AUTO: 0.85 K/UL (ref 1–4.8)
LYMPHOCYTES NFR BLD: 12.2 % (ref 22–41)
MCH RBC QN AUTO: 29.5 PG (ref 27–33)
MCHC RBC AUTO-ENTMCNC: 32 G/DL (ref 33.7–35.3)
MCV RBC AUTO: 92.3 FL (ref 81.4–97.8)
MONOCYTES # BLD AUTO: 0.6 K/UL (ref 0–0.85)
MONOCYTES NFR BLD AUTO: 8.6 % (ref 0–13.4)
NEUTROPHILS # BLD AUTO: 5.2 K/UL (ref 1.82–7.42)
NEUTROPHILS NFR BLD: 75 % (ref 44–72)
NRBC # BLD AUTO: 0 K/UL
NRBC BLD-RTO: 0 /100 WBC
PLATELET # BLD AUTO: 331 K/UL (ref 164–446)
PMV BLD AUTO: 8.9 FL (ref 9–12.9)
POTASSIUM SERPL-SCNC: 4 MMOL/L (ref 3.6–5.5)
RBC # BLD AUTO: 4.44 M/UL (ref 4.7–6.1)
SODIUM SERPL-SCNC: 139 MMOL/L (ref 135–145)
TSH SERPL DL<=0.005 MIU/L-ACNC: 3.19 UIU/ML (ref 0.38–5.33)
VIT B12 SERPL-MCNC: 503 PG/ML (ref 211–911)
WBC # BLD AUTO: 6.9 K/UL (ref 4.8–10.8)

## 2021-03-24 PROCEDURE — 85025 COMPLETE CBC W/AUTO DIFF WBC: CPT

## 2021-03-24 PROCEDURE — 700111 HCHG RX REV CODE 636 W/ 250 OVERRIDE (IP): Performed by: INTERNAL MEDICINE

## 2021-03-24 PROCEDURE — 82607 VITAMIN B-12: CPT

## 2021-03-24 PROCEDURE — 94640 AIRWAY INHALATION TREATMENT: CPT

## 2021-03-24 PROCEDURE — 96372 THER/PROPH/DIAG INJ SC/IM: CPT

## 2021-03-24 PROCEDURE — G0378 HOSPITAL OBSERVATION PER HR: HCPCS

## 2021-03-24 PROCEDURE — 97166 OT EVAL MOD COMPLEX 45 MIN: CPT

## 2021-03-24 PROCEDURE — 700102 HCHG RX REV CODE 250 W/ 637 OVERRIDE(OP): Performed by: INTERNAL MEDICINE

## 2021-03-24 PROCEDURE — A9270 NON-COVERED ITEM OR SERVICE: HCPCS | Performed by: INTERNAL MEDICINE

## 2021-03-24 PROCEDURE — 97162 PT EVAL MOD COMPLEX 30 MIN: CPT

## 2021-03-24 PROCEDURE — 80048 BASIC METABOLIC PNL TOTAL CA: CPT

## 2021-03-24 PROCEDURE — 36415 COLL VENOUS BLD VENIPUNCTURE: CPT

## 2021-03-24 PROCEDURE — 99225 PR SUBSEQUENT OBSERVATION CARE,LEVEL II: CPT | Performed by: STUDENT IN AN ORGANIZED HEALTH CARE EDUCATION/TRAINING PROGRAM

## 2021-03-24 PROCEDURE — 84443 ASSAY THYROID STIM HORMONE: CPT

## 2021-03-24 PROCEDURE — 72148 MRI LUMBAR SPINE W/O DYE: CPT | Mod: MF

## 2021-03-24 RX ADMIN — DONEPEZIL HYDROCHLORIDE 10 MG: 5 TABLET, FILM COATED ORAL at 06:20

## 2021-03-24 RX ADMIN — CLOPIDOGREL BISULFATE 75 MG: 75 TABLET ORAL at 06:20

## 2021-03-24 RX ADMIN — GABAPENTIN 100 MG: 100 CAPSULE ORAL at 17:13

## 2021-03-24 RX ADMIN — QUETIAPINE FUMARATE 25 MG: 25 TABLET ORAL at 06:20

## 2021-03-24 RX ADMIN — BUDESONIDE AND FORMOTEROL FUMARATE DIHYDRATE 2 PUFF: 160; 4.5 AEROSOL RESPIRATORY (INHALATION) at 20:27

## 2021-03-24 RX ADMIN — ENOXAPARIN SODIUM 40 MG: 40 INJECTION SUBCUTANEOUS at 06:20

## 2021-03-24 RX ADMIN — DOCUSATE SODIUM 50 MG AND SENNOSIDES 8.6 MG 2 TABLET: 8.6; 5 TABLET, FILM COATED ORAL at 17:13

## 2021-03-24 RX ADMIN — GABAPENTIN 100 MG: 100 CAPSULE ORAL at 12:44

## 2021-03-24 RX ADMIN — GABAPENTIN 100 MG: 100 CAPSULE ORAL at 06:20

## 2021-03-24 RX ADMIN — QUETIAPINE FUMARATE 25 MG: 25 TABLET ORAL at 17:13

## 2021-03-24 RX ADMIN — BUDESONIDE AND FORMOTEROL FUMARATE DIHYDRATE 2 PUFF: 160; 4.5 AEROSOL RESPIRATORY (INHALATION) at 07:47

## 2021-03-24 RX ADMIN — TAMSULOSIN HYDROCHLORIDE 0.4 MG: 0.4 CAPSULE ORAL at 06:20

## 2021-03-24 RX ADMIN — DOCUSATE SODIUM 50 MG AND SENNOSIDES 8.6 MG 2 TABLET: 8.6; 5 TABLET, FILM COATED ORAL at 06:20

## 2021-03-24 ASSESSMENT — COGNITIVE AND FUNCTIONAL STATUS - GENERAL
MOBILITY SCORE: 17
TURNING FROM BACK TO SIDE WHILE IN FLAT BAD: A LITTLE
DRESSING REGULAR LOWER BODY CLOTHING: A LITTLE
WALKING IN HOSPITAL ROOM: A LITTLE
TOILETING: A LITTLE
HELP NEEDED FOR BATHING: A LITTLE
DRESSING REGULAR UPPER BODY CLOTHING: A LITTLE
DAILY ACTIVITIY SCORE: 19
PERSONAL GROOMING: A LITTLE
STANDING UP FROM CHAIR USING ARMS: A LITTLE
CLIMB 3 TO 5 STEPS WITH RAILING: A LITTLE
MOVING FROM LYING ON BACK TO SITTING ON SIDE OF FLAT BED: A LOT
SUGGESTED CMS G CODE MODIFIER DAILY ACTIVITY: CK
SUGGESTED CMS G CODE MODIFIER MOBILITY: CK
MOVING TO AND FROM BED TO CHAIR: A LITTLE

## 2021-03-24 ASSESSMENT — ENCOUNTER SYMPTOMS
DIAPHORESIS: 0
HEADACHES: 0
SHORTNESS OF BREATH: 0
FLANK PAIN: 0
CONSTIPATION: 0
VOMITING: 0
FEVER: 0
HEMOPTYSIS: 0
SPEECH CHANGE: 0
BLURRED VISION: 0
COUGH: 0
ABDOMINAL PAIN: 0
POLYDIPSIA: 0
BACK PAIN: 0
EYE DISCHARGE: 0
PALPITATIONS: 0
TREMORS: 0
EYE REDNESS: 0
SPUTUM PRODUCTION: 0
CHILLS: 0
DEPRESSION: 0
EYE PAIN: 0
FOCAL WEAKNESS: 0
DIARRHEA: 0
CLAUDICATION: 0
BRUISES/BLEEDS EASILY: 0
LOSS OF CONSCIOUSNESS: 0
MYALGIAS: 1
SORE THROAT: 0
SENSORY CHANGE: 0
NAUSEA: 0
DIZZINESS: 0
NECK PAIN: 0

## 2021-03-24 ASSESSMENT — GAIT ASSESSMENTS
DISTANCE (FEET): 20
DEVIATION: OTHER (COMMENT)
GAIT LEVEL OF ASSIST: MINIMAL ASSIST
ASSISTIVE DEVICE: FRONT WHEEL WALKER;OTHER (COMMENTS)

## 2021-03-24 ASSESSMENT — ACTIVITIES OF DAILY LIVING (ADL): TOILETING: INDEPENDENT

## 2021-03-24 ASSESSMENT — PAIN DESCRIPTION - PAIN TYPE: TYPE: ACUTE PAIN

## 2021-03-24 NOTE — DISCHARGE PLANNING
Anticipated Discharge Disposition:   TBD    Action:   Discussed discharge planning needs during rounds. Per MD, MRI ordered, pending. PT/OT ordered, pending.    Per chart review, pt is confused. RN CM called pt's daughter, April ([phone]), no answer. RN CM called pt's mother, Amarilis. Per Amarilis, she hasn't talked to April in a while, gave SAAD LUCERO the sme contact number. SAAD LUCERO spoke to NIC Benson for SCP, no other contact information. Voalte message sent to [facility] for NOK search for daughter.     Barriers to Discharge:   Medical clearance  PT/OT eval and recommendations    Plan:   Hospital Care Management will continue to follow and assist with discharge planning needs.

## 2021-03-24 NOTE — CARE PLAN
Problem: Communication  Goal: The ability to communicate needs accurately and effectively will improve  Outcome: PROGRESSING SLOWER THAN EXPECTED  Intervention: Reorient patient to environment as needed  Note: Patient continues to state he is in [place] and wants to leave back home, reorienting patient as necessary. He has a history of dementia and requires frequent reorientation, easily redirectable       Problem: Safety  Goal: Will remain free from falls  Outcome: PROGRESSING AS EXPECTED  Provided patient education about fall risk and importance of calling for assistance when wanting to get out of bed. Patient verbalizes understanding although has dementia and forgets. Fall precautions in place: bed locked and in lowest position, patient wearing threaded footwear, call light within reach. Patient does use call light appropriately, telesitter in place

## 2021-03-24 NOTE — PROGRESS NOTES
"Patient refusing to go to MRI. Educated that imaging is to further investigate the pain to his leg, pt states \"That's a thing of the past my leg doesn't hurt anymore I don't need it, I went to [place] to get an MRI\" Pt redirected that he is in [place] and in the hospital that the Dr ordered an MRI and has not had one done. Pt refusing to be taken down.   "

## 2021-03-24 NOTE — PROGRESS NOTES
Dr. Rosen reviewed the Patient't imaging and nothing hazardous was found. Pt. Is safe to proceed to MRI.

## 2021-03-24 NOTE — THERAPY
"Occupational Therapy   Initial Evaluation     Patient Name: Remi Matt  Age:  75 y.o., Sex:  male  Medical Record #: [id number]  Today's Date: 3/24/2021     Precautions: Fall Risk  Comments: impulsive; dementia     Assessment  Patient is 75 y.o. male admitted after GLF, PMHX: BPH, COPD, dyslipidemia, and vascular dementia. This admission pt is dx w/acute pain of RLE, and elevated CPK. During today's assesment pt had no c/o pain, even reported \"the pain is gone so I can go home now\" pt was primarily limited by confusion/impulsivity and impaired balance. Pt is not an accurate historian, need to confirm w/family regarding his PLOF in order to make full d/c recommendations see comments below.     Plan    Recommend Occupational Therapy 2 times per week until therapy goals are met for the following treatments:  Adaptive Equipment, Self Care/Activities of Daily Living, Therapeutic Activities and Therapeutic Exercises.    DC Equipment Recommendations: Unable to determine at this time  Discharge Recommendations: (TBD- recommend 24/7 spv ) If pt is at his PLOF requiring min-CGA for all ADL's and txfs recommend HH and 24/7 spv; if pt is below his baseline functionally and family can not provide current level of assist recommend placement. If pt does not have adequate assist at home may required more long term assist/placement/spv    Subjective  \"I need my clothes to go outside its too cold\"      Objective     03/24/21 1050   Prior Living Situation   Prior Services Unable To Determine At This Time   Comments pt is a poor hx not about to provide accurate hx    Prior Level of ADL Function   Self Feeding Independent   Grooming / Hygiene Independent   Bathing Independent   Dressing Independent   Toileting Independent   Comments per pt report need to confirm w/family    Prior Level of IADL Function   Comments Unable To Determine At This Time   History of Falls   History of Falls Yes   Date of Last Fall   (this admission " falling out of bed )   Precautions   Precautions Fall Risk   Comments impulsive; dementia    Pain 0 - 10 Group   Therapist Pain Assessment 0;Nurse Notified   Cognition    Cognition / Consciousness X   Level of Consciousness Confused   Ability To Follow Commands 1 Step   Safety Awareness Impaired;Impulsive   New Learning Impaired   Sequencing Impaired   Comments perseverating on needing his clothes to go walk around, slow to initiate tasks, poor safety w/fww    Passive ROM Upper Body   Passive ROM Upper Body WDL   Active ROM Upper Body   Active ROM Upper Body  X   Comments limited by weakess    Strength Upper Body   Upper Body Strength  X   Gross Strength Generalized Weakness, Equal Bilaterally.    Sensation Upper Body   Upper Extremity Sensation  Not Tested   Coordination Upper Body   Coordination WDL   Balance Assessment   Sitting Balance (Static) Fair   Sitting Balance (Dynamic) Fair   Standing Balance (Static) Fair -   Standing Balance (Dynamic) Poor +   Weight Shift Sitting Fair   Weight Shift Standing Poor   Comments w/HHA and fww    Bed Mobility    Supine to Sit Supervised   Sit to Supine Supervised   ADL Assessment   Grooming Minimal Assist;Standing   Upper Body Dressing Minimal Assist   Lower Body Dressing Supervision   Toileting   (NT )   Comments assist for sequencing and attention as well as balance in standing    How much help from another person does the patient currently need...   6 Clicks Daily Activity Score 19   Functional Mobility   Sit to Stand Minimal Assist   Bed, Chair, Wheelchair Transfer Minimal Assist   Mobility walking in room w/fww and HHA better w/HHA    Visual Perception   Visual Perception  Not Tested   Activity Tolerance   Comments no overt c/o pain or fatigue    Patient / Family Goals   Patient / Family Goal #1 to go home    Short Term Goals   Short Term Goal # 1 pt will complete toilet txf w/spv    Short Term Goal # 2 pt will complete FB dressing w/set up    Education Group   Role of  Occupational Therapist Patient Response Patient;Acceptance;Explanation   Problem List   Problem List Decreased Upper Extremity Strength Right;Decreased Upper Extremity Strength Left;Decreased Active Daily Living Skills;Impaired Postural Control / Balance   Anticipated Discharge Equipment and Recommendations   DC Equipment Recommendations Unable to determine at this time   Discharge Recommendations   (TBD- recommend 24/7 spv )   Interdisciplinary Plan of Care Collaboration   IDT Collaboration with  Nursing   Patient Position at End of Therapy In Bed;Chair Alarm On;Tray Table within Reach;Phone within Reach;Call Light within Reach   Collaboration Comments RN aware of session    Session Information   Date / Session Number  3/24 #1 (1/2, 3/30)   Priority 1

## 2021-03-24 NOTE — PROGRESS NOTES
[facility] Medicine Daily Progress Note    Date of Service  3/24/2021    Chief Complaint  75 y.o. male admitted 3/22/2021 with right leg pain    Hospital Course  75 y.o. male with a past medical history of BPH, COPD, dyslipidemia, vascular dementia, asthma who presented 3/22/2021 with right leg pain for the past 3 days.  Patient reports sharp pain that starts from his right hip and radiates down to his right foot.  He reports he woke up with severe pain that was limiting his ability to ambulate and resulted in a fall.  He denied hitting his head or any loss of consciousness..  It is not exacerbated by movement.  He reports his skin is extremely sensitive to touch.  He denies any fevers, chills, focal weakness, numbness or tingling.  He denies any upper extremity pain.  In the ER he was noted to have an elevated CPK.  The patient does take atorvastatin.  He was admitted on 3/14 for similar symptoms and underwent x-ray of his right hip that was negative for fracture and ultrasound right lower extremity that was negative for DVT.      Interval Problem Update  I evaluated and examined the patient at the bedside.  Labs and imaging were reviewed. Care plan was discussed with the patient and bedside nurse.    Patient reported that RLE pain resolved.  He said he used some cream from his mom which helped the pain.  No more tenderness.  He declined MRI yesterday.  Talked to him this morning, since this is the second time that he was admitted for the same reason, would like to have the lumbar MRI done.  He agreed.  PT OT consulted.    ESR, CRP normal, Ordered b12, TSH      Consultants/Specialty      Code Status  Full Code    Disposition  Pt ot    Review of Systems  Review of Systems   Constitutional: Positive for malaise/fatigue. Negative for chills, diaphoresis and fever.   HENT: Negative for sore throat.    Eyes: Negative for blurred vision, pain, discharge and redness.   Respiratory: Negative for cough, hemoptysis, sputum  production and shortness of breath.    Cardiovascular: Negative for chest pain, palpitations, claudication and leg swelling.   Gastrointestinal: Negative for abdominal pain, constipation, diarrhea, nausea and vomiting.   Genitourinary: Negative for dysuria, flank pain, frequency, hematuria and urgency.   Musculoskeletal: Positive for myalgias. Negative for back pain, joint pain and neck pain.        R leg pain   Skin: Negative for itching.   Neurological: Negative for dizziness, tremors, sensory change, speech change, focal weakness, loss of consciousness and headaches.   Endo/Heme/Allergies: Negative for polydipsia. Does not bruise/bleed easily.   Psychiatric/Behavioral: Negative for depression.        Physical Exam  Temp:  [36.4 °C (97.5 °F)-36.8 °C (98.3 °F)] 36.4 °C (97.5 °F)  Pulse:  [65-81] 65  Resp:  [18-20] 19  BP: (107-129)/(64-77) 117/65  SpO2:  [95 %-100 %] 100 %    Physical Exam  Constitutional:       General: He is not in acute distress.     Appearance: Normal appearance. He is not diaphoretic.   HENT:      Head: Normocephalic and atraumatic.      Nose: Nose normal.      Mouth/Throat:      Pharynx: Oropharynx is clear. No oropharyngeal exudate or posterior oropharyngeal erythema.   Eyes:      General:         Right eye: No discharge.         Left eye: No discharge.   Cardiovascular:      Rate and Rhythm: Normal rate and regular rhythm.      Pulses: Normal pulses.      Heart sounds: Normal heart sounds. No gallop.    Pulmonary:      Effort: Pulmonary effort is normal. No respiratory distress.      Breath sounds: Normal breath sounds. No stridor. No wheezing, rhonchi or rales.   Chest:      Chest wall: No tenderness.   Abdominal:      General: Abdomen is flat. Bowel sounds are normal. There is no distension.      Palpations: Abdomen is soft.      Tenderness: There is no abdominal tenderness. There is no guarding or rebound.   Musculoskeletal:         General: No swelling, tenderness, deformity or signs of  injury. Normal range of motion.      Cervical back: No rigidity. No muscular tenderness.      Right lower leg: No edema.      Left lower leg: No edema.   Neurological:      General: No focal deficit present.      Mental Status: He is alert and oriented to person, place, and time. Mental status is at baseline.      Sensory: No sensory deficit.      Motor: No weakness.      Gait: Gait normal.   Psychiatric:         Mood and Affect: Mood normal.         Fluids    Intake/Output Summary (Last 24 hours) at 3/24/2021 1448  Last data filed at 3/24/2021 1100  Gross per 24 hour   Intake 240 ml   Output --   Net 240 ml       Laboratory  Recent Labs     03/22/21  1125 03/23/21  0346 03/24/21  0633   WBC 8.5 6.4 6.9   RBC 4.62* 4.34* 4.44*   HEMOGLOBIN 13.8* 13.2* 13.1*   HEMATOCRIT 42.7 39.6* 41.0*   MCV 92.4 91.2 92.3   MCH 29.9 30.4 29.5   MCHC 32.3* 33.3* 32.0*   RDW 46.5 46.2 45.6   PLATELETCT 308 314 331   MPV 8.8* 8.9* 8.9*     Recent Labs     03/22/21  1125 03/23/21  0346 03/24/21  0633   SODIUM 139 141 139   POTASSIUM 4.1 4.3 4.0   CHLORIDE 105 110 105   CO2 24 23 25   GLUCOSE 90 94 105*   BUN 18 17 16   CREATININE 0.75 0.83 0.81   CALCIUM 9.4 9.6 9.1                   Imaging  DX-SKULL-LIMITED 3-   Final Result      There is metallic denture.   No radiopaque foreign body identified.      DX-CHEST-PORTABLE (1 VIEW)   Final Result         1. No radiopaque foreign body identified.      EM-SXWJVQI-0 VIEW   Final Result         No radiopaque foreign body identified.      DX-TIBIA AND FIBULA RIGHT   Final Result      No fracture of RIGHT lower leg.      DX-FEMUR-2+ RIGHT   Final Result      No RIGHT femur fracture.      MR-LUMBAR SPINE-W/O    (Results Pending)        Assessment/Plan  * Acute pain of right lower extremity- (present on admission)  Assessment & Plan  No evidence of fracture or DVT on imaging  ESR, CRP normal    Ordered b12, TSH  Check MRI lumbar spine for further evaluation  Pain control with Tylenol,  ibuprofen, gabapentin and oxycodone.  IV morphine as needed for breakthrough.  Monitor respiratory status closely        Elevated CPK- (present on admission)  Assessment & Plan  Secondary to trauma versus statin  Hold statin for now  Trend CPK    Benign prostatic hyperplasia with urinary frequency- (present on admission)  Assessment & Plan  Continue Flomax    Vascular dementia with behavior disturbance (HCC)- (present on admission)  Assessment & Plan  Continue Aricept and Seroquel    COPD (chronic obstructive pulmonary disease) (HCC)- (present on admission)  Assessment & Plan  Not in acute exacerbation  Continue home regimen of inhalers  RT protocol       VTE prophylaxis: lovenox

## 2021-03-24 NOTE — CARE PLAN
Problem: Fluid Volume:  Goal: Will maintain balanced intake and output  Outcome: PROGRESSING AS EXPECTED     Problem: Communication  Goal: The ability to communicate needs accurately and effectively will improve  Outcome: PROGRESSING AS EXPECTED    Tele sitter in place, bed alarm on, hourly rounding, adequate fluids, frequent toileting, asses pain level.

## 2021-03-25 ENCOUNTER — APPOINTMENT (OUTPATIENT)
Dept: RADIOLOGY | Facility: MEDICAL CENTER | Age: 76
End: 2021-03-25
Attending: STUDENT IN AN ORGANIZED HEALTH CARE EDUCATION/TRAINING PROGRAM
Payer: MEDICARE

## 2021-03-25 ENCOUNTER — APPOINTMENT (OUTPATIENT)
Dept: MEDICAL GROUP | Facility: PHYSICIAN GROUP | Age: 76
End: 2021-03-25
Payer: MEDICARE

## 2021-03-25 LAB
ANION GAP SERPL CALC-SCNC: 9 MMOL/L (ref 7–16)
BASOPHILS # BLD AUTO: 0.6 % (ref 0–1.8)
BASOPHILS # BLD: 0.04 K/UL (ref 0–0.12)
BUN SERPL-MCNC: 15 MG/DL (ref 8–22)
CALCIUM SERPL-MCNC: 9.5 MG/DL (ref 8.5–10.5)
CHLORIDE SERPL-SCNC: 103 MMOL/L (ref 96–112)
CO2 SERPL-SCNC: 24 MMOL/L (ref 20–33)
CREAT SERPL-MCNC: 0.72 MG/DL (ref 0.5–1.4)
EOSINOPHIL # BLD AUTO: 0.18 K/UL (ref 0–0.51)
EOSINOPHIL NFR BLD: 2.7 % (ref 0–6.9)
ERYTHROCYTE [DISTWIDTH] IN BLOOD BY AUTOMATED COUNT: 46.1 FL (ref 35.9–50)
GLUCOSE SERPL-MCNC: 99 MG/DL (ref 65–99)
HCT VFR BLD AUTO: 41.8 % (ref 42–52)
HGB BLD-MCNC: 13.4 G/DL (ref 14–18)
IMM GRANULOCYTES # BLD AUTO: 0.05 K/UL (ref 0–0.11)
IMM GRANULOCYTES NFR BLD AUTO: 0.7 % (ref 0–0.9)
LYMPHOCYTES # BLD AUTO: 0.73 K/UL (ref 1–4.8)
LYMPHOCYTES NFR BLD: 10.9 % (ref 22–41)
MCH RBC QN AUTO: 29.3 PG (ref 27–33)
MCHC RBC AUTO-ENTMCNC: 32.1 G/DL (ref 33.7–35.3)
MCV RBC AUTO: 91.5 FL (ref 81.4–97.8)
MONOCYTES # BLD AUTO: 0.49 K/UL (ref 0–0.85)
MONOCYTES NFR BLD AUTO: 7.3 % (ref 0–13.4)
NEUTROPHILS # BLD AUTO: 5.22 K/UL (ref 1.82–7.42)
NEUTROPHILS NFR BLD: 77.8 % (ref 44–72)
NRBC # BLD AUTO: 0 K/UL
NRBC BLD-RTO: 0 /100 WBC
PLATELET # BLD AUTO: 329 K/UL (ref 164–446)
PMV BLD AUTO: 8.9 FL (ref 9–12.9)
POTASSIUM SERPL-SCNC: 4.2 MMOL/L (ref 3.6–5.5)
RBC # BLD AUTO: 4.57 M/UL (ref 4.7–6.1)
SODIUM SERPL-SCNC: 136 MMOL/L (ref 135–145)
WBC # BLD AUTO: 6.7 K/UL (ref 4.8–10.8)

## 2021-03-25 PROCEDURE — 72149 MRI LUMBAR SPINE W/DYE: CPT

## 2021-03-25 PROCEDURE — 36415 COLL VENOUS BLD VENIPUNCTURE: CPT

## 2021-03-25 PROCEDURE — G0378 HOSPITAL OBSERVATION PER HR: HCPCS

## 2021-03-25 PROCEDURE — A9270 NON-COVERED ITEM OR SERVICE: HCPCS | Performed by: INTERNAL MEDICINE

## 2021-03-25 PROCEDURE — 94640 AIRWAY INHALATION TREATMENT: CPT

## 2021-03-25 PROCEDURE — 700117 HCHG RX CONTRAST REV CODE 255: Performed by: STUDENT IN AN ORGANIZED HEALTH CARE EDUCATION/TRAINING PROGRAM

## 2021-03-25 PROCEDURE — 85025 COMPLETE CBC W/AUTO DIFF WBC: CPT

## 2021-03-25 PROCEDURE — A9576 INJ PROHANCE MULTIPACK: HCPCS | Performed by: STUDENT IN AN ORGANIZED HEALTH CARE EDUCATION/TRAINING PROGRAM

## 2021-03-25 PROCEDURE — 96376 TX/PRO/DX INJ SAME DRUG ADON: CPT

## 2021-03-25 PROCEDURE — 96374 THER/PROPH/DIAG INJ IV PUSH: CPT

## 2021-03-25 PROCEDURE — 700102 HCHG RX REV CODE 250 W/ 637 OVERRIDE(OP): Performed by: INTERNAL MEDICINE

## 2021-03-25 PROCEDURE — 96372 THER/PROPH/DIAG INJ SC/IM: CPT

## 2021-03-25 PROCEDURE — 700111 HCHG RX REV CODE 636 W/ 250 OVERRIDE (IP): Performed by: INTERNAL MEDICINE

## 2021-03-25 PROCEDURE — 700111 HCHG RX REV CODE 636 W/ 250 OVERRIDE (IP): Performed by: STUDENT IN AN ORGANIZED HEALTH CARE EDUCATION/TRAINING PROGRAM

## 2021-03-25 PROCEDURE — 99225 PR SUBSEQUENT OBSERVATION CARE,LEVEL II: CPT | Performed by: STUDENT IN AN ORGANIZED HEALTH CARE EDUCATION/TRAINING PROGRAM

## 2021-03-25 PROCEDURE — 80048 BASIC METABOLIC PNL TOTAL CA: CPT

## 2021-03-25 RX ORDER — HALOPERIDOL 5 MG/ML
5 INJECTION INTRAMUSCULAR ONCE
Status: COMPLETED | OUTPATIENT
Start: 2021-03-25 | End: 2021-03-25

## 2021-03-25 RX ORDER — QUETIAPINE FUMARATE 25 MG/1
50 TABLET, FILM COATED ORAL 2 TIMES DAILY
Status: DISCONTINUED | OUTPATIENT
Start: 2021-03-26 | End: 2021-03-28

## 2021-03-25 RX ORDER — HALOPERIDOL 5 MG/ML
2-5 INJECTION INTRAMUSCULAR
Status: DISCONTINUED | OUTPATIENT
Start: 2021-03-25 | End: 2021-03-31 | Stop reason: HOSPADM

## 2021-03-25 RX ADMIN — BUDESONIDE AND FORMOTEROL FUMARATE DIHYDRATE 2 PUFF: 160; 4.5 AEROSOL RESPIRATORY (INHALATION) at 19:46

## 2021-03-25 RX ADMIN — ACETAMINOPHEN 650 MG: 325 TABLET, FILM COATED ORAL at 04:51

## 2021-03-25 RX ADMIN — QUETIAPINE FUMARATE 25 MG: 25 TABLET ORAL at 17:15

## 2021-03-25 RX ADMIN — BUDESONIDE AND FORMOTEROL FUMARATE DIHYDRATE 2 PUFF: 160; 4.5 AEROSOL RESPIRATORY (INHALATION) at 08:16

## 2021-03-25 RX ADMIN — ENOXAPARIN SODIUM 40 MG: 40 INJECTION SUBCUTANEOUS at 04:51

## 2021-03-25 RX ADMIN — HALOPERIDOL LACTATE 5 MG: 5 INJECTION, SOLUTION INTRAMUSCULAR at 23:53

## 2021-03-25 RX ADMIN — GADOTERIDOL 15 ML: 279.3 INJECTION, SOLUTION INTRAVENOUS at 08:22

## 2021-03-25 RX ADMIN — GABAPENTIN 100 MG: 100 CAPSULE ORAL at 04:51

## 2021-03-25 RX ADMIN — QUETIAPINE FUMARATE 25 MG: 25 TABLET ORAL at 04:51

## 2021-03-25 RX ADMIN — TAMSULOSIN HYDROCHLORIDE 0.4 MG: 0.4 CAPSULE ORAL at 04:51

## 2021-03-25 RX ADMIN — DONEPEZIL HYDROCHLORIDE 10 MG: 5 TABLET, FILM COATED ORAL at 04:51

## 2021-03-25 RX ADMIN — CLOPIDOGREL BISULFATE 75 MG: 75 TABLET ORAL at 04:51

## 2021-03-25 RX ADMIN — GABAPENTIN 100 MG: 100 CAPSULE ORAL at 11:40

## 2021-03-25 RX ADMIN — GABAPENTIN 100 MG: 100 CAPSULE ORAL at 17:15

## 2021-03-25 RX ADMIN — DOCUSATE SODIUM 50 MG AND SENNOSIDES 8.6 MG 2 TABLET: 8.6; 5 TABLET, FILM COATED ORAL at 04:51

## 2021-03-25 RX ADMIN — HALOPERIDOL LACTATE 5 MG: 5 INJECTION, SOLUTION INTRAMUSCULAR at 00:47

## 2021-03-25 ASSESSMENT — ENCOUNTER SYMPTOMS
CHILLS: 0
SPUTUM PRODUCTION: 0
FLANK PAIN: 0
DEPRESSION: 0
DIAPHORESIS: 0
POLYDIPSIA: 0
COUGH: 0
EYE DISCHARGE: 0
SPEECH CHANGE: 0
TREMORS: 0
MYALGIAS: 0
PALPITATIONS: 0
BACK PAIN: 0
ABDOMINAL PAIN: 0
NAUSEA: 0
SENSORY CHANGE: 0
FOCAL WEAKNESS: 0
CLAUDICATION: 0
FEVER: 0
NECK PAIN: 0
LOSS OF CONSCIOUSNESS: 0
BLURRED VISION: 0
HEMOPTYSIS: 0
SHORTNESS OF BREATH: 0
DIARRHEA: 0
DIZZINESS: 0
EYE REDNESS: 0
SORE THROAT: 0
EYE PAIN: 0
VOMITING: 0
BRUISES/BLEEDS EASILY: 0
HEADACHES: 0
CONSTIPATION: 0

## 2021-03-25 ASSESSMENT — PAIN DESCRIPTION - PAIN TYPE: TYPE: ACUTE PAIN

## 2021-03-25 NOTE — PROGRESS NOTES
"Patient trying to get out of bed to go into gonzalez, states he is \"going to do something\" while imitating shooting a gun with his hand. Reoriented patient to environment and surroundings, patient became very agitated and resistant to getting back into bed.     Page out to hospitalist, spoke with MD Amanda Wiggins. Received order for haldol, see MAR administration. Patient escorted back into room, sat at edge of bed discussing plan to shoot somebody with RN and CNA, fell asleep voluntarily eventually, asleep at this time. Bed alarm and telesitter in place, Q1H rounding in progress, bed locked and in the lowest position. Call light within reach.  "

## 2021-03-25 NOTE — CARE PLAN
Treaded socks in place, bed in the lowest position, bed alarm on, call light and belongings within reach, pt call for assistance appropriately

## 2021-03-25 NOTE — THERAPY
Physical Therapy   Initial Evaluation     Patient Name: Remi Matt  Age:  75 y.o., Sex:  male  Medical Record #: [id number]  Today's Date: 3/24/2021     Precautions: Fall Risk    Assessment  Pt presents to PT with impaired balance, gait and general locomotion associated with medical co-morbidities. His current cognitive status is exacerbating his risk for falls and functional impairments. He was able to demonstrate short distance ambulation with FWW and HHA with min A. He has difficulty with appropriate use of FWW and actually performed better with HHA (though this is not likely to have effective functional carryover at time of dc as he is reliant on this caregiver for assist). Will continue to visit with details/recs below.      Plan    Recommend Physical Therapy 3 times per week until therapy goals are met for the following treatments:  Bed Mobility, Community Re-integration, Equipment, Gait Training, Manual Therapy, Neuro Re-Education / Balance, Self Care/Home Evaluation, Stair Training, Therapeutic Activities and Therapeutic Exercises    DC Equipment Recommendations:  Unable to determine at this time  Discharge Recommendations:  Recommend post-acute placement for additional physical therapy services prior to discharge home        03/24/21 1049   Prior Living Situation   Prior Services Unable To Determine At This Time   Comments pt unable to provide accurate hx; confused; reports living with  mother?   Prior Level of Functional Mobility   Bed Mobility Unable To Determine At This Time   Transfer Status Unable To Determine At This Time   Ambulation Unable To Determine At This Time   Assistive Devices Used Unable to Determine At This Time   Stairs Unable To Determine At This Time   History of Falls   History of Falls Yes   Cognition    Cognition / Consciousness X   Level of Consciousness Confused   Ability To Follow Commands 1 Step   Safety Awareness Impaired;Impulsive   New Learning Impaired    Sequencing Impaired   Comments tangential and perseverative; poor safety awareness and decreased insight, though quite pleasant when able to redirect;    Passive ROM Lower Body   Passive ROM Lower Body WDL   Active ROM Lower Body    Active ROM Lower Body  WDL   Strength Lower Body   Lower Body Strength  WDL   Sensation Lower Body   Lower Extremity Sensation   WDL   Balance Assessment   Sitting Balance (Static) Fair   Sitting Balance (Dynamic) Fair   Standing Balance (Static) Fair -   Standing Balance (Dynamic) Poor +   Weight Shift Sitting Fair   Weight Shift Standing Poor   Comments no fredy LOB during ambulation with FWW or HHA; difficulty with sequencing/correct use of FWW; does best with HHA though not practical for long term carryover    Gait Analysis   Gait Level Of Assist Minimal Assist   Assistive Device Front Wheel Walker;Other (Comments)  (HHA)   Distance (Feet) 20   # of Times Distance was Traveled 2   Deviation Other (Comment)  (reciprocla gait; inconsistent gait mechanics; see rayshawn)   # of Stairs Climbed 0   Weight Bearing Status no restrictions   Comments see balance;    Bed Mobility    Supine to Sit Supervised   Sit to Supine Supervised   Scooting Supervised   Functional Mobility   Sit to Stand Minimal Assist   Bed, Chair, Wheelchair Transfer Minimal Assist   Transfer Method Other (Comments)  (walks to EOB)   Mobility with FWW/HHA; better with HHA   How much difficulty does the patient currently have...   Turning over in bed (including adjusting bedclothes, sheets and blankets)? 3   Sitting down on and standing up from a chair with arms (e.g., wheelchair, bedside commode, etc.) 2   Moving from lying on back to sitting on the side of the bed? 3   How much help from another person does the patient currently need...   Moving to and from a bed to a chair (including a wheelchair)? 3   Need to walk in a hospital room? 3   Climbing 3-5 steps with a railing? 3   6 clicks Mobility Score 17   Activity  Tolerance   Sitting in Chair NT   Sitting Edge of Bed at least 5 mins   Standing at least 6 mins   Comments no overt/acute fatigue   Edema / Skin Assessment   Edema / Skin  Not Assessed   Patient / Family Goals    Patient / Family Goal #1 unable to effectively state   Short Term Goals    Short Term Goal # 1 Pt will be able to perform sit<>stand/transfers with FWW with Spv in 6tx to promote fnx progression towards I    Short Term Goal # 2 Pt will be able to ambulate 150ft with FWW with Spv in 6tx to promote fnx progression towards I    Education Group   Education Provided Role of Physical Therapist;Use of Assistive Device   Role of Physical Therapist Patient Response Patient;Acceptance;Explanation;Verbal Demonstration;Reinforcement Needed;No Learning Evidence   Use of Assistive Device Patient Response Patient;Explanation;Reinforcement Needed;No Learning Evidence   Additional Comments pt having notable difficulty with use of FWW

## 2021-03-25 NOTE — RESPIRATORY CARE
"COPD EDUCATION by COPD CLINICAL EDUCATOR  3/24/2021  at  5:20 PM by Fiona Rosa, RRT     Patient interviewed by COPD education team.  Patient unable to participate in full program.  Short intervention has been conducted.  A comprehensive packet including information about COPD, treatments, and smoking cessation COPD Screen  COPD Risk Screening  Do you have a history of COPD?: Yes  Do you have a Pulmonologist?: No  COPD Population Screener  During the past 4 weeks, how much did you feel short of breath?: Some of the time  Do you ever cough up any mucus or phlegm?: Yes, a few days a week or month  In the past 12 months, you do less than you used to because of your breathing problems: Strongly agree  Have you smoked at least 100 cigarettes in your entire life?: Yes  How old are you?: 60+  COPD Screening Score: 8  COPD Coordinator Not Recommended: Yes    COPD Assessment  COPD Clinical Specialists ONLY  COPD Education Initiated: Yes--Short Intervention   Pleasantly confused unsure of his medications was able to identify Breo and Ventolin   He has a care provider that assists at home.   He states he quit smoking   Last PFT in EMR 2013 FEV1=77; FEV1/FVC Ratio = 66; Reviewed his upcoming appointment as noted  Physician Follow Up Appointment: 03/29/21 Appt Time: 1530 Referrals Initiated: Initiated  [facility] Community Outreach: Yes (per history and poor memory sent for consideration)  Is this a COPD exacerbation patient?: No  Meds to Bed sent for review     MY COPD ACTION PLAN     It is recommended that patients and physicians /healthcare providers complete this action plan together. This plan should be discussed at each physician visit and updated as needed.    The green, yellow and red zones show groups of symptoms of COPD. This list of symptoms is not comprehensive, and you may experience other symptoms. In the \"Actions\" column, your healthcare provider has recommended actions for you to take based on your " "symptoms.    Patient Name: Remi Matt   YOB: 1945   Last Updated on:     Green Zone:  I am doing well today Actions   •  Usual activitiy and exercise level •  Take daily medications   •  Usual amounts of cough and phlegm/mucus •  Use oxygen as prescribed   •  Sleep well at night •  Continue regular exercise/diet plan   •  Appetite is good •  At all times avoid cigarette smoke, inhaled irritants     Daily Medications (these medications are taken every day):   Fluticasone Furoate and Vilanterol trifenatate (Breo) 1 Puff Once daily     Additional Information:  Rinse your mouth after using    Yellow Zone:  I am having a bad day or a COPD flare Actions   •  More breathless than usual •  Continue daily medications   •  I have less energy for my daily activities •  Use quick relief inhaler as ordered   •  Increased or thicker phlegm/mucus •  Use oxygen as prescribed   •  Using quick relief inhaler/nebulizer more often •  Get plenty of rest   •  Swelling of ankles more than usual •  Use pursed lip breathing   •  More coughing than usual •  At all times avoid cigarette smoke, inhaled irritants   •  I feel like I have a \"chest cold\"   •  Poor sleep and my symptoms woke me up   •  My appetite is not good   •  My medicine is not helping    •  Call provider immediately if symptoms don’t improve     Continue daily medications, add rescue medications:   Albuterol 2 Puffs Every 4 hours PRN       Medications to be used during a flare up, (as Discussed with Provider):              Red Zone:  I need urgent medical care Actions   •  Severe shortness of breath even at rest •  Call 911 or seek medical care immediately   •  Not able to do any activity because of breathing    •  Fever or shaking chills    •  Feeling confused or very drowsy     •  Chest pains    •  Coughing up blood              "

## 2021-03-25 NOTE — CARE PLAN
Problem: Communication  Goal: The ability to communicate needs accurately and effectively will improve  Outcome: PROGRESSING SLOWER THAN EXPECTED  Intervention: Mack patient and significant other/support system to call light to alert staff of needs  Note: Patient unable to communicate needs effectively with use of call light, telesitter in place. Hourly rounding in progress.      Problem: Safety  Goal: Will remain free from injury  Outcome: PROGRESSING AS EXPECTED  Intervention: Provide assistance with mobility  Note: Patient is hand held assist. Assistance being provided when patient needs to ambulate

## 2021-03-25 NOTE — PROGRESS NOTES
[facility] Medicine Daily Progress Note    Date of Service  3/25/2021    Chief Complaint  75 y.o. male admitted 3/22/2021 with right leg pain    Hospital Course  75 y.o. male with a past medical history of BPH, COPD, dyslipidemia, vascular dementia, asthma who presented 3/22/2021 with right leg pain for the past 3 days.  Patient reports sharp pain that starts from his right hip and radiates down to his right foot.  He reports he woke up with severe pain that was limiting his ability to ambulate and resulted in a fall.  He denied hitting his head or any loss of consciousness..  It is not exacerbated by movement.  He reports his skin is extremely sensitive to touch.  He denies any fevers, chills, focal weakness, numbness or tingling.  He denies any upper extremity pain.  In the ER he was noted to have an elevated CPK.  The patient does take atorvastatin.  He was admitted on 3/14 for similar symptoms and underwent x-ray of his right hip that was negative for fracture and ultrasound right lower extremity that was negative for DVT.      Interval Problem Update  I evaluated and examined the patient at the bedside.  Labs and imaging were reviewed. Care plan was discussed with the patient and bedside nurse.    Patient feels well, no more leg pain since yesterday.  MRI lumbar yesterday showed ill-defined epidural space at the levels of L3, L4, L5 and S1, concerning for epidural pathology such as fluid collection/hemorrhage.  Follow-up MRI with contrast showed indicated possible minimal epidural hemorrhage given recent fall history.    PT OT consulted.  Recommended SNF    ESR, CRP normal, Ordered b12, TSH      Consultants/Specialty      Code Status  Full Code    Disposition  SNF    Review of Systems  Review of Systems   Constitutional: Negative for chills, diaphoresis, fever and malaise/fatigue.   HENT: Negative for sore throat.    Eyes: Negative for blurred vision, pain, discharge and redness.   Respiratory: Negative for  cough, hemoptysis, sputum production and shortness of breath.    Cardiovascular: Negative for chest pain, palpitations, claudication and leg swelling.   Gastrointestinal: Negative for abdominal pain, constipation, diarrhea, nausea and vomiting.   Genitourinary: Negative for dysuria, flank pain, frequency, hematuria and urgency.   Musculoskeletal: Negative for back pain, joint pain, myalgias and neck pain.        R leg pain   Skin: Negative for itching.   Neurological: Negative for dizziness, tremors, sensory change, speech change, focal weakness, loss of consciousness and headaches.   Endo/Heme/Allergies: Negative for polydipsia. Does not bruise/bleed easily.   Psychiatric/Behavioral: Negative for depression.        Physical Exam  Temp:  [36.2 °C (97.1 °F)-36.6 °C (97.9 °F)] 36.2 °C (97.1 °F)  Pulse:  [64-74] 64  Resp:  [16-18] 18  BP: (103-125)/(55-62) 118/55  SpO2:  [94 %-98 %] 98 %    Physical Exam  Constitutional:       General: He is not in acute distress.     Appearance: Normal appearance. He is not diaphoretic.   HENT:      Head: Normocephalic and atraumatic.      Nose: Nose normal.      Mouth/Throat:      Pharynx: Oropharynx is clear. No oropharyngeal exudate or posterior oropharyngeal erythema.   Eyes:      General:         Right eye: No discharge.         Left eye: No discharge.   Cardiovascular:      Rate and Rhythm: Normal rate and regular rhythm.      Pulses: Normal pulses.      Heart sounds: Normal heart sounds. No gallop.    Pulmonary:      Effort: Pulmonary effort is normal. No respiratory distress.      Breath sounds: Normal breath sounds. No stridor. No wheezing, rhonchi or rales.   Chest:      Chest wall: No tenderness.   Abdominal:      General: Abdomen is flat. Bowel sounds are normal. There is no distension.      Palpations: Abdomen is soft.      Tenderness: There is no abdominal tenderness. There is no guarding or rebound.   Musculoskeletal:         General: No swelling, tenderness, deformity  or signs of injury. Normal range of motion.      Cervical back: No rigidity. No muscular tenderness.      Right lower leg: No edema.      Left lower leg: No edema.   Neurological:      General: No focal deficit present.      Mental Status: He is alert and oriented to person, place, and time. Mental status is at baseline.      Sensory: No sensory deficit.      Motor: No weakness.      Gait: Gait normal.   Psychiatric:         Mood and Affect: Mood normal.         Fluids    Intake/Output Summary (Last 24 hours) at 3/25/2021 1619  Last data filed at 3/25/2021 1500  Gross per 24 hour   Intake 720 ml   Output --   Net 720 ml       Laboratory  Recent Labs     03/23/21 0346 03/24/21  0633 03/25/21  0825   WBC 6.4 6.9 6.7   RBC 4.34* 4.44* 4.57*   HEMOGLOBIN 13.2* 13.1* 13.4*   HEMATOCRIT 39.6* 41.0* 41.8*   MCV 91.2 92.3 91.5   MCH 30.4 29.5 29.3   MCHC 33.3* 32.0* 32.1*   RDW 46.2 45.6 46.1   PLATELETCT 314 331 329   MPV 8.9* 8.9* 8.9*     Recent Labs     03/23/21 0346 03/24/21  0633 03/25/21  0825   SODIUM 141 139 136   POTASSIUM 4.3 4.0 4.2   CHLORIDE 110 105 103   CO2 23 25 24   GLUCOSE 94 105* 99   BUN 17 16 15   CREATININE 0.83 0.81 0.72   CALCIUM 9.6 9.1 9.5                   Imaging  MR-LUMBAR SPINE-WITH   Final Result      1.  The postgadolinium sequences demonstrates mild epidural contrast enhancement at the levels of L4, L5 and S1 . There is no well-defined fluid collection. This is still a nonspecific finding. Since the patient does not have any increased WBC count and    there is history of recent ground-level fall, the possibility of minimal epidural hemorrhage is favored over the infection.   2.  There is no MR evidence of discitis, osteomyelitis or soft tissue infection.      MR-LUMBAR SPINE-W/O   Final Result      1.  There is ill-defined epidural space at the levels of L3, L4, L5 and S1. This is concerning for epidural pathology such as fluid collection/hemorrhage. Contrast-enhanced study is recommended  to rule out any epidural abscess. There is no significant    canal compromise.   2.  Degenerative disease as described above.      DX-SKULL-LIMITED 3-   Final Result      There is metallic denture.   No radiopaque foreign body identified.      DX-CHEST-PORTABLE (1 VIEW)   Final Result         1. No radiopaque foreign body identified.      RO-UHJHGWC-2 VIEW   Final Result         No radiopaque foreign body identified.      DX-TIBIA AND FIBULA RIGHT   Final Result      No fracture of RIGHT lower leg.      DX-FEMUR-2+ RIGHT   Final Result      No RIGHT femur fracture.           Assessment/Plan  * Acute pain of right lower extremity- (present on admission)  Assessment & Plan  No evidence of fracture or DVT on imaging  ESR, CRP, b12, TSH normal    MRI lumbar yesterday showed ill-defined epidural space at the levels of L3, L4, L5 and S1, concerning for epidural pathology such as fluid collection/hemorrhage.    Follow-up MRI with contrast showed indicated possible minimal epidural hemorrhage given recent fall history.    -Pain resolved      Elevated CPK- (present on admission)  Assessment & Plan  Secondary to trauma versus statin  Hold statin for now  Trend CPK    Benign prostatic hyperplasia with urinary frequency- (present on admission)  Assessment & Plan  Continue Flomax    Vascular dementia with behavior disturbance (HCC)- (present on admission)  Assessment & Plan  Continue Aricept and Seroquel    COPD (chronic obstructive pulmonary disease) (HCC)- (present on admission)  Assessment & Plan  Not in acute exacerbation  Continue home regimen of inhalers  RT protocol       VTE prophylaxis: lovenox

## 2021-03-26 PROBLEM — R41.0 DELIRIUM: Status: ACTIVE | Noted: 2021-03-26

## 2021-03-26 LAB
ANION GAP SERPL CALC-SCNC: 8 MMOL/L (ref 7–16)
BASOPHILS # BLD AUTO: 0.8 % (ref 0–1.8)
BASOPHILS # BLD: 0.06 K/UL (ref 0–0.12)
BUN SERPL-MCNC: 15 MG/DL (ref 8–22)
CALCIUM SERPL-MCNC: 9.5 MG/DL (ref 8.5–10.5)
CHLORIDE SERPL-SCNC: 105 MMOL/L (ref 96–112)
CO2 SERPL-SCNC: 27 MMOL/L (ref 20–33)
CREAT SERPL-MCNC: 0.85 MG/DL (ref 0.5–1.4)
EOSINOPHIL # BLD AUTO: 0.23 K/UL (ref 0–0.51)
EOSINOPHIL NFR BLD: 3.1 % (ref 0–6.9)
ERYTHROCYTE [DISTWIDTH] IN BLOOD BY AUTOMATED COUNT: 46.7 FL (ref 35.9–50)
GLUCOSE SERPL-MCNC: 96 MG/DL (ref 65–99)
HCT VFR BLD AUTO: 42 % (ref 42–52)
HGB BLD-MCNC: 13.7 G/DL (ref 14–18)
IMM GRANULOCYTES # BLD AUTO: 0.04 K/UL (ref 0–0.11)
IMM GRANULOCYTES NFR BLD AUTO: 0.5 % (ref 0–0.9)
LYMPHOCYTES # BLD AUTO: 0.76 K/UL (ref 1–4.8)
LYMPHOCYTES NFR BLD: 10.4 % (ref 22–41)
MCH RBC QN AUTO: 29.9 PG (ref 27–33)
MCHC RBC AUTO-ENTMCNC: 32.6 G/DL (ref 33.7–35.3)
MCV RBC AUTO: 91.7 FL (ref 81.4–97.8)
MONOCYTES # BLD AUTO: 0.65 K/UL (ref 0–0.85)
MONOCYTES NFR BLD AUTO: 8.9 % (ref 0–13.4)
NEUTROPHILS # BLD AUTO: 5.58 K/UL (ref 1.82–7.42)
NEUTROPHILS NFR BLD: 76.3 % (ref 44–72)
NRBC # BLD AUTO: 0 K/UL
NRBC BLD-RTO: 0 /100 WBC
PLATELET # BLD AUTO: 332 K/UL (ref 164–446)
PMV BLD AUTO: 8.8 FL (ref 9–12.9)
POTASSIUM SERPL-SCNC: 3.9 MMOL/L (ref 3.6–5.5)
RBC # BLD AUTO: 4.58 M/UL (ref 4.7–6.1)
SODIUM SERPL-SCNC: 140 MMOL/L (ref 135–145)
WBC # BLD AUTO: 7.3 K/UL (ref 4.8–10.8)

## 2021-03-26 PROCEDURE — 700102 HCHG RX REV CODE 250 W/ 637 OVERRIDE(OP): Performed by: INTERNAL MEDICINE

## 2021-03-26 PROCEDURE — 700102 HCHG RX REV CODE 250 W/ 637 OVERRIDE(OP): Performed by: STUDENT IN AN ORGANIZED HEALTH CARE EDUCATION/TRAINING PROGRAM

## 2021-03-26 PROCEDURE — 700111 HCHG RX REV CODE 636 W/ 250 OVERRIDE (IP): Performed by: STUDENT IN AN ORGANIZED HEALTH CARE EDUCATION/TRAINING PROGRAM

## 2021-03-26 PROCEDURE — G0378 HOSPITAL OBSERVATION PER HR: HCPCS

## 2021-03-26 PROCEDURE — 99214 OFFICE O/P EST MOD 30 MIN: CPT | Performed by: STUDENT IN AN ORGANIZED HEALTH CARE EDUCATION/TRAINING PROGRAM

## 2021-03-26 PROCEDURE — 85025 COMPLETE CBC W/AUTO DIFF WBC: CPT

## 2021-03-26 PROCEDURE — 80048 BASIC METABOLIC PNL TOTAL CA: CPT

## 2021-03-26 PROCEDURE — 36415 COLL VENOUS BLD VENIPUNCTURE: CPT

## 2021-03-26 PROCEDURE — 94640 AIRWAY INHALATION TREATMENT: CPT

## 2021-03-26 PROCEDURE — 96376 TX/PRO/DX INJ SAME DRUG ADON: CPT

## 2021-03-26 PROCEDURE — A9270 NON-COVERED ITEM OR SERVICE: HCPCS | Performed by: STUDENT IN AN ORGANIZED HEALTH CARE EDUCATION/TRAINING PROGRAM

## 2021-03-26 PROCEDURE — A9270 NON-COVERED ITEM OR SERVICE: HCPCS | Performed by: INTERNAL MEDICINE

## 2021-03-26 RX ADMIN — TAMSULOSIN HYDROCHLORIDE 0.4 MG: 0.4 CAPSULE ORAL at 05:05

## 2021-03-26 RX ADMIN — QUETIAPINE FUMARATE 50 MG: 25 TABLET ORAL at 17:35

## 2021-03-26 RX ADMIN — QUETIAPINE FUMARATE 50 MG: 25 TABLET ORAL at 05:06

## 2021-03-26 RX ADMIN — DOCUSATE SODIUM 50 MG AND SENNOSIDES 8.6 MG 2 TABLET: 8.6; 5 TABLET, FILM COATED ORAL at 05:06

## 2021-03-26 RX ADMIN — HALOPERIDOL LACTATE 5 MG: 5 INJECTION, SOLUTION INTRAMUSCULAR at 15:21

## 2021-03-26 RX ADMIN — CLOPIDOGREL BISULFATE 75 MG: 75 TABLET ORAL at 05:10

## 2021-03-26 RX ADMIN — OXYCODONE 5 MG: 5 TABLET ORAL at 19:28

## 2021-03-26 RX ADMIN — HALOPERIDOL LACTATE 5 MG: 5 INJECTION, SOLUTION INTRAMUSCULAR at 20:32

## 2021-03-26 RX ADMIN — GABAPENTIN 100 MG: 100 CAPSULE ORAL at 11:46

## 2021-03-26 RX ADMIN — GABAPENTIN 100 MG: 100 CAPSULE ORAL at 05:06

## 2021-03-26 RX ADMIN — DONEPEZIL HYDROCHLORIDE 10 MG: 5 TABLET, FILM COATED ORAL at 05:05

## 2021-03-26 RX ADMIN — GABAPENTIN 100 MG: 100 CAPSULE ORAL at 17:36

## 2021-03-26 RX ADMIN — BUDESONIDE AND FORMOTEROL FUMARATE DIHYDRATE 2 PUFF: 160; 4.5 AEROSOL RESPIRATORY (INHALATION) at 08:05

## 2021-03-26 ASSESSMENT — ENCOUNTER SYMPTOMS
DIZZINESS: 0
SPUTUM PRODUCTION: 0
FEVER: 0
TREMORS: 0
CLAUDICATION: 0
DIARRHEA: 0
SHORTNESS OF BREATH: 0
BLURRED VISION: 0
EYE DISCHARGE: 0
BRUISES/BLEEDS EASILY: 0
COUGH: 0
BACK PAIN: 0
ABDOMINAL PAIN: 0
HEMOPTYSIS: 0
EYE REDNESS: 0
SORE THROAT: 0
POLYDIPSIA: 0
NAUSEA: 0
SPEECH CHANGE: 0
DEPRESSION: 0
FLANK PAIN: 0
NECK PAIN: 0
DIAPHORESIS: 0
CHILLS: 0
SENSORY CHANGE: 0
EYE PAIN: 0
VOMITING: 0
FOCAL WEAKNESS: 0
CONSTIPATION: 0
HEADACHES: 0
PALPITATIONS: 0
MYALGIAS: 0
LOSS OF CONSCIOUSNESS: 0

## 2021-03-26 ASSESSMENT — PAIN SCALES - PAIN ASSESSMENT IN ADVANCED DEMENTIA (PAINAD)
BREATHING: NORMAL
NEGVOCALIZATION: REPEATED TROUBLED CALLING OUT, LOUD MOANING/GROANING, CRYING
BODYLANGUAGE: TENSE, DISTRESSED PACING, FIDGETING
CONSOLABILITY: DISTRACTED OR REASSURED BY VOICE/TOUCH
TOTALSCORE: 6
FACIALEXPRESSION: FACIAL GRIMACING

## 2021-03-26 ASSESSMENT — PAIN DESCRIPTION - PAIN TYPE
TYPE: ACUTE PAIN
TYPE: ACUTE PAIN

## 2021-03-26 NOTE — CARE PLAN
Problem: Skin Integrity  Goal: Risk for impaired skin integrity will decrease  Outcome: PROGRESSING AS EXPECTED  Patients skin is intact throughout, he is able to turn self from side to side often      Problem: Pain Management  Goal: Pain level will decrease to patient's comfort goal  Outcome: PROGRESSING AS EXPECTED  Patient continues to deny pain and any interventions at this time

## 2021-03-26 NOTE — PROGRESS NOTES
Increased daily seroquel to 50mg BID from  25mg BID due to patient has required Haloperidol tonight and last night for sedation due to aggression with staff.  Consider increasing dose further as indicated....

## 2021-03-26 NOTE — PROGRESS NOTES
Pt having verbal and physical behaviors. Pt attempting to leave. Staff attempted to redirect pt, offered restroom, offered snacks, called daughter and pt continues to be aggressive staff. Haldol given per MAR.

## 2021-03-26 NOTE — CARE PLAN
Treaded socks in place, bed in the lowest position, bed alarm on, call light and belongings within reach, telesitter in place.

## 2021-03-26 NOTE — ASSESSMENT & PLAN NOTE
Continue Aricept and Seroquel  
Continue Flomax  
No evidence of acute exacerbation  Continue supportive care  following  RT protocol  
No evidence of fracture or DVT on imaging  ESR, CRP, b12, TSH normal  MRI showed L4-5, L5-S1 diffuse disc bulging, and possible minimal epidural hemorrhage at the levels of L3-S1 given recent fall history.  Pain improved, trial of lidocaine  Discussed with  , neurosurgery who recommended to discontinue Plavix and Lovenox (ppx dose) given possible minimal epidural hemorrhage.  Recommended outpatient follow-up.      
Reportedly improving but some visual hallucinations  Seroquel was decreased to home dose 25 mg.    CT head without contrast no acute pathology  Continue to follow  
Secondary to trauma versus statin  Statin held  Will repeat CPK  
Yes

## 2021-03-26 NOTE — DISCHARGE PLANNING
Care Transition Team Discharge Planning    Anticipated Discharge Disposition: DC to SNF pending.    Action: Lsw met with patient to discuss SNF options.  Pt gave Lsw his first two options.  Lsw faxed CHOICE form to [facility].  Lsw placed CHOICE form in CM basket.    Barriers to Discharge: Awaiting medical clearance and SNF acceptance.    Plan: Lsw will assist medical team with discharge planning.

## 2021-03-26 NOTE — PROGRESS NOTES
[facility] Medicine Daily Progress Note    Date of Service  3/26/2021    Chief Complaint  75 y.o. male admitted 3/22/2021 with right leg pain    Hospital Course  75 y.o. male with a past medical history of BPH, COPD, dyslipidemia, vascular dementia, asthma who presented 3/22/2021 with right leg pain for the past 3 days.  Patient reports sharp pain that starts from his right hip and radiates down to his right foot.  He reports he woke up with severe pain that was limiting his ability to ambulate and resulted in a fall.  He denied hitting his head or any loss of consciousness..  It is not exacerbated by movement.  He reports his skin is extremely sensitive to touch.  He denies any fevers, chills, focal weakness, numbness or tingling.  He denies any upper extremity pain.  In the ER he was noted to have an elevated CPK.  The patient does take atorvastatin.  He was admitted on 3/14 for similar symptoms and underwent x-ray of his right hip that was negative for fracture and ultrasound right lower extremity that was negative for DVT.      Interval Problem Update  I evaluated and examined the patient at the bedside.  Labs and imaging were reviewed. Care plan was discussed with the patient and bedside nurse.    Episodes of confusion and agitation last night, was given Haldol, Seroquel increased to 50 mg per night shift    Patient was more calm and cooperative in the morning when I saw him.  He reported right leg pain resolved.  Since this is the second admission in 1 month for the same reason, will consult neurosurgery for further recommendations.  MRI showed L4-5, L5-S1 diffuse disc bulging, and possible minimal epidural hemorrhage at the levels of L3-S1 given recent fall history.    PT OT consulted.  Recommended SNF    Consultants/Specialty      Code Status  Full Code    Disposition  SNF    Review of Systems  Review of Systems   Constitutional: Negative for chills, diaphoresis, fever and malaise/fatigue.   HENT: Negative for  sore throat.    Eyes: Negative for blurred vision, pain, discharge and redness.   Respiratory: Negative for cough, hemoptysis, sputum production and shortness of breath.    Cardiovascular: Negative for chest pain, palpitations, claudication and leg swelling.   Gastrointestinal: Negative for abdominal pain, constipation, diarrhea, nausea and vomiting.   Genitourinary: Negative for dysuria, flank pain, frequency, hematuria and urgency.   Musculoskeletal: Negative for back pain, joint pain, myalgias and neck pain.        R leg pain   Skin: Negative for itching.   Neurological: Negative for dizziness, tremors, sensory change, speech change, focal weakness, loss of consciousness and headaches.   Endo/Heme/Allergies: Negative for polydipsia. Does not bruise/bleed easily.   Psychiatric/Behavioral: Negative for depression.        Physical Exam  Temp:  [36.1 °C (97 °F)-36.3 °C (97.4 °F)] 36.1 °C (97 °F)  Pulse:  [69-82] 73  Resp:  [16-18] 16  BP: (118-145)/(56-69) 118/63  SpO2:  [96 %-100 %] 99 %    Physical Exam  Constitutional:       General: He is not in acute distress.     Appearance: Normal appearance. He is not diaphoretic.   HENT:      Head: Normocephalic and atraumatic.      Nose: Nose normal.      Mouth/Throat:      Pharynx: Oropharynx is clear. No oropharyngeal exudate or posterior oropharyngeal erythema.   Eyes:      General:         Right eye: No discharge.         Left eye: No discharge.   Cardiovascular:      Rate and Rhythm: Normal rate and regular rhythm.      Pulses: Normal pulses.      Heart sounds: Normal heart sounds. No gallop.    Pulmonary:      Effort: Pulmonary effort is normal. No respiratory distress.      Breath sounds: Normal breath sounds. No stridor. No wheezing, rhonchi or rales.   Chest:      Chest wall: No tenderness.   Abdominal:      General: Abdomen is flat. Bowel sounds are normal. There is no distension.      Palpations: Abdomen is soft.      Tenderness: There is no abdominal tenderness.  There is no guarding or rebound.   Musculoskeletal:         General: No swelling, tenderness, deformity or signs of injury. Normal range of motion.      Cervical back: No rigidity. No muscular tenderness.      Right lower leg: No edema.      Left lower leg: No edema.   Neurological:      General: No focal deficit present.      Mental Status: He is alert and oriented to person, place, and time. Mental status is at baseline.      Sensory: No sensory deficit.      Motor: No weakness.      Gait: Gait normal.   Psychiatric:         Mood and Affect: Mood normal.         Fluids    Intake/Output Summary (Last 24 hours) at 3/26/2021 1209  Last data filed at 3/25/2021 2300  Gross per 24 hour   Intake 480 ml   Output 525 ml   Net -45 ml       Laboratory  Recent Labs     03/24/21  0633 03/25/21  0825 03/26/21  0558   WBC 6.9 6.7 7.3   RBC 4.44* 4.57* 4.58*   HEMOGLOBIN 13.1* 13.4* 13.7*   HEMATOCRIT 41.0* 41.8* 42.0   MCV 92.3 91.5 91.7   MCH 29.5 29.3 29.9   MCHC 32.0* 32.1* 32.6*   RDW 45.6 46.1 46.7   PLATELETCT 331 329 332   MPV 8.9* 8.9* 8.8*     Recent Labs     03/24/21  0633 03/25/21  0825 03/26/21  0558   SODIUM 139 136 140   POTASSIUM 4.0 4.2 3.9   CHLORIDE 105 103 105   CO2 25 24 27   GLUCOSE 105* 99 96   BUN 16 15 15   CREATININE 0.81 0.72 0.85   CALCIUM 9.1 9.5 9.5                   Imaging  MR-LUMBAR SPINE-WITH   Final Result      1.  The postgadolinium sequences demonstrates mild epidural contrast enhancement at the levels of L4, L5 and S1 . There is no well-defined fluid collection. This is still a nonspecific finding. Since the patient does not have any increased WBC count and    there is history of recent ground-level fall, the possibility of minimal epidural hemorrhage is favored over the infection.   2.  There is no MR evidence of discitis, osteomyelitis or soft tissue infection.      MR-LUMBAR SPINE-W/O   Final Result      1.  There is ill-defined epidural space at the levels of L3, L4, L5 and S1. This is  concerning for epidural pathology such as fluid collection/hemorrhage. Contrast-enhanced study is recommended to rule out any epidural abscess. There is no significant    canal compromise.   2.  Degenerative disease as described above.      DX-SKULL-LIMITED 3-   Final Result      There is metallic denture.   No radiopaque foreign body identified.      DX-CHEST-PORTABLE (1 VIEW)   Final Result         1. No radiopaque foreign body identified.      OL-FNRFKCR-0 VIEW   Final Result         No radiopaque foreign body identified.      DX-TIBIA AND FIBULA RIGHT   Final Result      No fracture of RIGHT lower leg.      DX-FEMUR-2+ RIGHT   Final Result      No RIGHT femur fracture.           Assessment/Plan  * Acute pain of right lower extremity- (present on admission)  Assessment & Plan  No evidence of fracture or DVT on imaging  ESR, CRP, b12, TSH normal     MRI showed L4-5, L5-S1 diffuse disc bulging, and possible minimal epidural hemorrhage at the levels of L3-S1 given recent fall history.    -Pain resolved  - Since this is the second admission in 1 month for the same reason, will consult neurosurgery for further recommendations.       Delirium  Assessment & Plan  3/25, 3/26  Episodes of confusion and agitation last night, was given Haldol, Seroquel increased to 50 mg per night shift    Elevated CPK- (present on admission)  Assessment & Plan  Secondary to trauma versus statin  Hold statin for now  Trend CPK    Benign prostatic hyperplasia with urinary frequency- (present on admission)  Assessment & Plan  Continue Flomax    Vascular dementia with behavior disturbance (HCC)- (present on admission)  Assessment & Plan  Continue Aricept and Seroquel    COPD (chronic obstructive pulmonary disease) (HCC)- (present on admission)  Assessment & Plan  Not in acute exacerbation  Continue home regimen of inhalers  RT protocol       VTE prophylaxis: lovenox

## 2021-03-26 NOTE — PROGRESS NOTES
Patient screaming in room attempting to get out of bed, RN and CNA went into room to redirect patient. He attempted to hit RN when asked to stay in bed, continued screaming he wants to go home.     Voalte text to MD Amanda Wiggins to update, MD placed orders.    Medication to be administered per MAR

## 2021-03-26 NOTE — DISCHARGE PLANNING
Chuy received a voalte from nursing stating that Danielle Chauhan was wanting a call back from Chuy.     Chuy spoke with Danielle Chauhan to update her of the patient's DC plan. Chuy informed Ms. Chauhan that the patient is awaiting placement at a SNF.

## 2021-03-26 NOTE — DISCHARGE PLANNING
Received Choice form at 5523  Agency/Facility Name: [facility]. [facility] 2. [facility]  Referral sent per Choice form @ 0318

## 2021-03-27 LAB
ANION GAP SERPL CALC-SCNC: 10 MMOL/L (ref 7–16)
BASOPHILS # BLD AUTO: 0.4 % (ref 0–1.8)
BASOPHILS # BLD: 0.04 K/UL (ref 0–0.12)
BUN SERPL-MCNC: 17 MG/DL (ref 8–22)
CALCIUM SERPL-MCNC: 9.6 MG/DL (ref 8.5–10.5)
CHLORIDE SERPL-SCNC: 104 MMOL/L (ref 96–112)
CO2 SERPL-SCNC: 24 MMOL/L (ref 20–33)
CREAT SERPL-MCNC: 0.76 MG/DL (ref 0.5–1.4)
EOSINOPHIL # BLD AUTO: 0.22 K/UL (ref 0–0.51)
EOSINOPHIL NFR BLD: 2 % (ref 0–6.9)
ERYTHROCYTE [DISTWIDTH] IN BLOOD BY AUTOMATED COUNT: 46.2 FL (ref 35.9–50)
GLUCOSE SERPL-MCNC: 102 MG/DL (ref 65–99)
HCT VFR BLD AUTO: 44.3 % (ref 42–52)
HGB BLD-MCNC: 14.3 G/DL (ref 14–18)
IMM GRANULOCYTES # BLD AUTO: 0.03 K/UL (ref 0–0.11)
IMM GRANULOCYTES NFR BLD AUTO: 0.3 % (ref 0–0.9)
LYMPHOCYTES # BLD AUTO: 1.1 K/UL (ref 1–4.8)
LYMPHOCYTES NFR BLD: 10.2 % (ref 22–41)
MCH RBC QN AUTO: 29.7 PG (ref 27–33)
MCHC RBC AUTO-ENTMCNC: 32.3 G/DL (ref 33.7–35.3)
MCV RBC AUTO: 91.9 FL (ref 81.4–97.8)
MONOCYTES # BLD AUTO: 0.88 K/UL (ref 0–0.85)
MONOCYTES NFR BLD AUTO: 8.1 % (ref 0–13.4)
NEUTROPHILS # BLD AUTO: 8.54 K/UL (ref 1.82–7.42)
NEUTROPHILS NFR BLD: 79 % (ref 44–72)
NRBC # BLD AUTO: 0 K/UL
NRBC BLD-RTO: 0 /100 WBC
PLATELET # BLD AUTO: 329 K/UL (ref 164–446)
PMV BLD AUTO: 9.1 FL (ref 9–12.9)
POTASSIUM SERPL-SCNC: 4.1 MMOL/L (ref 3.6–5.5)
RBC # BLD AUTO: 4.82 M/UL (ref 4.7–6.1)
SODIUM SERPL-SCNC: 138 MMOL/L (ref 135–145)
WBC # BLD AUTO: 10.8 K/UL (ref 4.8–10.8)

## 2021-03-27 PROCEDURE — A9270 NON-COVERED ITEM OR SERVICE: HCPCS | Performed by: INTERNAL MEDICINE

## 2021-03-27 PROCEDURE — 96372 THER/PROPH/DIAG INJ SC/IM: CPT

## 2021-03-27 PROCEDURE — G0378 HOSPITAL OBSERVATION PER HR: HCPCS

## 2021-03-27 PROCEDURE — 700111 HCHG RX REV CODE 636 W/ 250 OVERRIDE (IP): Performed by: STUDENT IN AN ORGANIZED HEALTH CARE EDUCATION/TRAINING PROGRAM

## 2021-03-27 PROCEDURE — 700111 HCHG RX REV CODE 636 W/ 250 OVERRIDE (IP): Performed by: INTERNAL MEDICINE

## 2021-03-27 PROCEDURE — 80048 BASIC METABOLIC PNL TOTAL CA: CPT

## 2021-03-27 PROCEDURE — 96376 TX/PRO/DX INJ SAME DRUG ADON: CPT

## 2021-03-27 PROCEDURE — 700102 HCHG RX REV CODE 250 W/ 637 OVERRIDE(OP): Performed by: STUDENT IN AN ORGANIZED HEALTH CARE EDUCATION/TRAINING PROGRAM

## 2021-03-27 PROCEDURE — 36415 COLL VENOUS BLD VENIPUNCTURE: CPT

## 2021-03-27 PROCEDURE — 94640 AIRWAY INHALATION TREATMENT: CPT

## 2021-03-27 PROCEDURE — 700102 HCHG RX REV CODE 250 W/ 637 OVERRIDE(OP): Performed by: INTERNAL MEDICINE

## 2021-03-27 PROCEDURE — A9270 NON-COVERED ITEM OR SERVICE: HCPCS | Performed by: STUDENT IN AN ORGANIZED HEALTH CARE EDUCATION/TRAINING PROGRAM

## 2021-03-27 PROCEDURE — 85025 COMPLETE CBC W/AUTO DIFF WBC: CPT

## 2021-03-27 PROCEDURE — 99214 OFFICE O/P EST MOD 30 MIN: CPT | Performed by: STUDENT IN AN ORGANIZED HEALTH CARE EDUCATION/TRAINING PROGRAM

## 2021-03-27 RX ADMIN — QUETIAPINE FUMARATE 50 MG: 25 TABLET ORAL at 05:10

## 2021-03-27 RX ADMIN — GABAPENTIN 100 MG: 100 CAPSULE ORAL at 18:12

## 2021-03-27 RX ADMIN — HALOPERIDOL LACTATE 5 MG: 5 INJECTION, SOLUTION INTRAMUSCULAR at 02:32

## 2021-03-27 RX ADMIN — GABAPENTIN 100 MG: 100 CAPSULE ORAL at 05:10

## 2021-03-27 RX ADMIN — DONEPEZIL HYDROCHLORIDE 10 MG: 5 TABLET, FILM COATED ORAL at 05:05

## 2021-03-27 RX ADMIN — DOCUSATE SODIUM 50 MG AND SENNOSIDES 8.6 MG 2 TABLET: 8.6; 5 TABLET, FILM COATED ORAL at 18:12

## 2021-03-27 RX ADMIN — TAMSULOSIN HYDROCHLORIDE 0.4 MG: 0.4 CAPSULE ORAL at 05:05

## 2021-03-27 RX ADMIN — BUDESONIDE AND FORMOTEROL FUMARATE DIHYDRATE 2 PUFF: 160; 4.5 AEROSOL RESPIRATORY (INHALATION) at 18:10

## 2021-03-27 RX ADMIN — QUETIAPINE FUMARATE 50 MG: 25 TABLET ORAL at 18:12

## 2021-03-27 RX ADMIN — CLOPIDOGREL BISULFATE 75 MG: 75 TABLET ORAL at 05:11

## 2021-03-27 RX ADMIN — ACETAMINOPHEN 650 MG: 325 TABLET, FILM COATED ORAL at 16:47

## 2021-03-27 RX ADMIN — OXYCODONE HYDROCHLORIDE 10 MG: 10 TABLET ORAL at 05:11

## 2021-03-27 RX ADMIN — ENOXAPARIN SODIUM 40 MG: 40 INJECTION SUBCUTANEOUS at 05:09

## 2021-03-27 RX ADMIN — DOCUSATE SODIUM 50 MG AND SENNOSIDES 8.6 MG 2 TABLET: 8.6; 5 TABLET, FILM COATED ORAL at 05:11

## 2021-03-27 ASSESSMENT — ENCOUNTER SYMPTOMS
HEMOPTYSIS: 0
LOSS OF CONSCIOUSNESS: 0
SENSORY CHANGE: 0
CONSTIPATION: 0
TREMORS: 0
EYE DISCHARGE: 0
NAUSEA: 0
FOCAL WEAKNESS: 0
DIZZINESS: 0
SHORTNESS OF BREATH: 0
ABDOMINAL PAIN: 0
DIAPHORESIS: 0
COUGH: 0
CLAUDICATION: 0
BRUISES/BLEEDS EASILY: 0
DEPRESSION: 0
FLANK PAIN: 0
HEADACHES: 0
BLURRED VISION: 0
MYALGIAS: 0
EYE PAIN: 0
PALPITATIONS: 0
POLYDIPSIA: 0
SORE THROAT: 0
DIARRHEA: 0
SPEECH CHANGE: 0
CHILLS: 0
EYE REDNESS: 0
FEVER: 0
VOMITING: 0
NECK PAIN: 0
BACK PAIN: 0
SPUTUM PRODUCTION: 0

## 2021-03-27 ASSESSMENT — PAIN SCALES - PAIN ASSESSMENT IN ADVANCED DEMENTIA (PAINAD)
BODYLANGUAGE: TENSE, DISTRESSED PACING, FIDGETING
BODYLANGUAGE: TENSE, DISTRESSED PACING, FIDGETING
CONSOLABILITY: NO NEED TO CONSOLE
BREATHING: NORMAL
CONSOLABILITY: NO NEED TO CONSOLE
FACIALEXPRESSION: SAD, FRIGHTENED, FROWN
TOTALSCORE: 2
FACIALEXPRESSION: SAD, FRIGHTENED, FROWN
TOTALSCORE: 3
NEGVOCALIZATION: OCCASIONAL MOAN/GROAN, LOW SPEECH, NEGATIVE/DISAPPROVING QUALITY
BREATHING: NORMAL

## 2021-03-27 ASSESSMENT — PAIN DESCRIPTION - PAIN TYPE
TYPE: ACUTE PAIN
TYPE: ACUTE PAIN

## 2021-03-27 NOTE — CONSULTS
DATE OF SERVICE:  03/27/2021     NEUROSURGICAL CONSULTATION     HISTORY OF PRESENT ILLNESS:  The patient is a 75-year-old male admitted on   03/22 approximately five days ago after having fallen out of bed.  He has a   history of BPH, COPD, dyslipidemia, vascular dementia with behavioral   disturbance.  He denies hitting his head or any loss of consciousness.  By   report, he is a terrible historian.  He was seen in the emergency room on   03/14 for right leg pain and discharged without any prescriptions.     PAST MEDICAL HISTORY:  As above.     PAST SURGICAL HISTORY:  Knee arthroscopy, hernia repair as a child and   tonsillectomy.     SOCIAL HISTORY:  He is a smoker.  Occasional EtOH.  No drugs.     FAMILY HISTORY:  Noncontributory.     MEDICATIONS:  Plavix, Lovenox 40 every day, Uroxatral, atorvastatin, Aricept,   Breo Ellipta, Combivent, Seroquel.     PHYSICAL EXAMINATION:  GENERAL:  Opens eyes to voice, not compliant with exam.  HEENT:  Pupils equal, round, reactive to light.  Conjugate gaze.  Face   symmetric.  Tongue midline.  NEUROLOGIC:  Motor is 5/5 strength in the upper extremities with significant   coaching and 4+ to 5 in all muscle groups in the lower extremities, but it   does require very significant coaching.     LABORATORY DATA:  CBC within normal limits.  Basic metabolic panel within   normal limits.  Coags were not done this admission.     IMAGING:  Lumbar MRI shows multilevel degenerative changes with a concern for   epidural hematoma, lumbar spine with contrast.  There is no evidence of   diskitis, osteomyelitis or soft tissue infection, questionable nonspecific   epidural collection concerning for hematoma.     PLAN:  There is no neurosurgical intervention at this time.  We would stop   Plavix and Lovenox given the small questionable epidural hematoma.  He can   follow up with me in the office as an outpatient.  Pain control, avoid NSAIDs   or aspirin.  At this point, we will sign off for now.   Please call with   questions.        ______________________________  MD ONUR REED/JABARI/MINDA    DD:  03/27/2021 10:35  DT:  03/27/2021 11:12    Job#:  [id number]

## 2021-03-27 NOTE — PROGRESS NOTES
RN spoke with patient's sister and MENDY Preciado. She is requesting the doctor call her at any point later today (3/27) for a full update on the patient's plan of care. RN will relay this to on coming day nurse.

## 2021-03-27 NOTE — PROGRESS NOTES
[facility] Medicine Daily Progress Note    Date of Service  3/27/2021    Chief Complaint  75 y.o. male admitted 3/22/2021 with right leg pain    Hospital Course  75 y.o. male with a past medical history of BPH, COPD, dyslipidemia, vascular dementia, asthma who presented 3/22/2021 with right leg pain for the past 3 days.  Patient reports sharp pain that starts from his right hip and radiates down to his right foot.  He reports he woke up with severe pain that was limiting his ability to ambulate and resulted in a fall.  He denied hitting his head or any loss of consciousness..  It is not exacerbated by movement.  He reports his skin is extremely sensitive to touch.  He denies any fevers, chills, focal weakness, numbness or tingling.  He denies any upper extremity pain.  In the ER he was noted to have an elevated CPK.  The patient does take atorvastatin.  He was admitted on 3/14 for similar symptoms and underwent x-ray of his right hip that was negative for fracture and ultrasound right lower extremity that was negative for DVT.      Interval Problem Update  I evaluated and examined the patient at the bedside.  Labs and imaging were reviewed. Care plan was discussed with the patient and bedside nurse.    Patient was sleepy this morning when I saw him.  He was agitated and confused last night and received Haldol.     Right-sided leg pain has been resolved.  Talked with , the neurosurgeon, recommended to discontinue Plavix and Lovenox given possible minimal epidural hemorrhage.  Recommended outpatient follow-up.    Accepted by SNF.  Likely discharge tomorrow    Talked to the daughter on the phone, updated her with the medical plans.  Voiced understanding and happy for the patient to go to SNF      Consultants/Specialty      Code Status  Full Code    Disposition  SNF    Review of Systems  Review of Systems   Constitutional: Negative for chills, diaphoresis, fever and malaise/fatigue.   HENT: Negative for sore  throat.    Eyes: Negative for blurred vision, pain, discharge and redness.   Respiratory: Negative for cough, hemoptysis, sputum production and shortness of breath.    Cardiovascular: Negative for chest pain, palpitations, claudication and leg swelling.   Gastrointestinal: Negative for abdominal pain, constipation, diarrhea, nausea and vomiting.   Genitourinary: Negative for dysuria, flank pain, frequency, hematuria and urgency.   Musculoskeletal: Negative for back pain, joint pain, myalgias and neck pain.        R leg pain   Skin: Negative for itching.   Neurological: Negative for dizziness, tremors, sensory change, speech change, focal weakness, loss of consciousness and headaches.   Endo/Heme/Allergies: Negative for polydipsia. Does not bruise/bleed easily.   Psychiatric/Behavioral: Negative for depression.        Physical Exam  Temp:  [36.3 °C (97.4 °F)-37.2 °C (98.9 °F)] 36.7 °C (98 °F)  Pulse:  [69-91] 84  Resp:  [18-19] 18  BP: (129-136)/(60-74) 134/69  SpO2:  [94 %-98 %] 94 %    Physical Exam  Constitutional:       General: He is not in acute distress.     Appearance: Normal appearance. He is not diaphoretic.   HENT:      Head: Normocephalic and atraumatic.      Nose: Nose normal.      Mouth/Throat:      Pharynx: Oropharynx is clear. No oropharyngeal exudate or posterior oropharyngeal erythema.   Eyes:      General:         Right eye: No discharge.         Left eye: No discharge.   Cardiovascular:      Rate and Rhythm: Normal rate and regular rhythm.      Pulses: Normal pulses.      Heart sounds: Normal heart sounds. No gallop.    Pulmonary:      Effort: Pulmonary effort is normal. No respiratory distress.      Breath sounds: Normal breath sounds. No stridor. No wheezing, rhonchi or rales.   Chest:      Chest wall: No tenderness.   Abdominal:      General: Abdomen is flat. Bowel sounds are normal. There is no distension.      Palpations: Abdomen is soft.      Tenderness: There is no abdominal tenderness.  There is no guarding or rebound.   Musculoskeletal:         General: No swelling, tenderness, deformity or signs of injury. Normal range of motion.      Cervical back: No rigidity. No muscular tenderness.      Right lower leg: No edema.      Left lower leg: No edema.   Neurological:      General: No focal deficit present.      Mental Status: He is alert and oriented to person, place, and time. Mental status is at baseline.      Sensory: No sensory deficit.      Motor: No weakness.      Gait: Gait normal.   Psychiatric:         Mood and Affect: Mood normal.         Fluids    Intake/Output Summary (Last 24 hours) at 3/27/2021 1340  Last data filed at 3/26/2021 2115  Gross per 24 hour   Intake 240 ml   Output --   Net 240 ml       Laboratory  Recent Labs     03/25/21 0825 03/26/21 0558 03/27/21  0146   WBC 6.7 7.3 10.8   RBC 4.57* 4.58* 4.82   HEMOGLOBIN 13.4* 13.7* 14.3   HEMATOCRIT 41.8* 42.0 44.3   MCV 91.5 91.7 91.9   MCH 29.3 29.9 29.7   MCHC 32.1* 32.6* 32.3*   RDW 46.1 46.7 46.2   PLATELETCT 329 332 329   MPV 8.9* 8.8* 9.1     Recent Labs     03/25/21 0825 03/26/21  0558 03/27/21  0146   SODIUM 136 140 138   POTASSIUM 4.2 3.9 4.1   CHLORIDE 103 105 104   CO2 24 27 24   GLUCOSE 99 96 102*   BUN 15 15 17   CREATININE 0.72 0.85 0.76   CALCIUM 9.5 9.5 9.6                   Imaging  MR-LUMBAR SPINE-WITH   Final Result      1.  The postgadolinium sequences demonstrates mild epidural contrast enhancement at the levels of L4, L5 and S1 . There is no well-defined fluid collection. This is still a nonspecific finding. Since the patient does not have any increased WBC count and    there is history of recent ground-level fall, the possibility of minimal epidural hemorrhage is favored over the infection.   2.  There is no MR evidence of discitis, osteomyelitis or soft tissue infection.      MR-LUMBAR SPINE-W/O   Final Result      1.  There is ill-defined epidural space at the levels of L3, L4, L5 and S1. This is  concerning for epidural pathology such as fluid collection/hemorrhage. Contrast-enhanced study is recommended to rule out any epidural abscess. There is no significant    canal compromise.   2.  Degenerative disease as described above.      DX-SKULL-LIMITED 3-   Final Result      There is metallic denture.   No radiopaque foreign body identified.      DX-CHEST-PORTABLE (1 VIEW)   Final Result         1. No radiopaque foreign body identified.      [id number] VIEW   Final Result         No radiopaque foreign body identified.      DX-TIBIA AND FIBULA RIGHT   Final Result      No fracture of RIGHT lower leg.      DX-FEMUR-2+ RIGHT   Final Result      No RIGHT femur fracture.           Assessment/Plan  * Acute pain of right lower extremity- (present on admission)  Assessment & Plan  No evidence of fracture or DVT on imaging  ESR, CRP, b12, TSH normal     MRI showed L4-5, L5-S1 diffuse disc bulging, and possible minimal epidural hemorrhage at the levels of L3-S1 given recent fall history.    -Pain resolved  - Since this is the second admission in 1 month for the same reason, will consult neurosurgery for further recommendations.   -Talked with , the neurosurgeon, recommended to discontinue Plavix and Lovenox (ppx dose) given possible minimal epidural hemorrhage.  Recommended outpatient follow-up.        Delirium  Assessment & Plan  3/25, 3/26  Episodes of confusion and agitation last night, was given Haldol, Seroquel increased to 50 mg per night shift    Elevated CPK- (present on admission)  Assessment & Plan  Secondary to trauma versus statin  Hold statin for now  Trend CPK    Benign prostatic hyperplasia with urinary frequency- (present on admission)  Assessment & Plan  Continue Flomax    Vascular dementia with behavior disturbance (HCC)- (present on admission)  Assessment & Plan  Continue Aricept and Seroquel    COPD (chronic obstructive pulmonary disease) (HCC)- (present on admission)  Assessment &  Plan  Not in acute exacerbation  Continue home regimen of inhalers  RT protocol       VTE prophylaxis: lovenox

## 2021-03-27 NOTE — DISCHARGE PLANNING
Anticipated Disposition:  [facility]    Action:   -> Chart review completed. Pt accepted by [facility], pending medical clearance.       1- This CM voalted attending physician for medical clearance. Per Dr. Hay, Pt is not medically clear to be discharged to [facility] today, will re-eval, possible transfer to [facility] tomorrow.      PASRR Manual Review                    Barriers to Discharge:   Medical clearance  Transporation    Plan:  Please follow up with attending physician for medical clearance, and arrange transportation to go to [facility].

## 2021-03-27 NOTE — DISCHARGE PLANNING
Agency/Facility Name: [facility]  Spoke To: Christen  Outcome: Christen stated that pt could be d/c'd to [facility] today. Christen requested that Case Management sets up transport for pt and call back with time    @[phone]  Agency/Facility Name: [facility]  Spoke To: Christen  Outcome: Per RNCM Tiffanie Bennett this DPA stated to Christen that pt would not be able to d/c to [facility] today

## 2021-03-27 NOTE — PROGRESS NOTES
Daughter (April) called for family update.   Roommate (Danielle) called for update per pt request.

## 2021-03-28 LAB
ANION GAP SERPL CALC-SCNC: 12 MMOL/L (ref 7–16)
BASOPHILS # BLD AUTO: 0.4 % (ref 0–1.8)
BASOPHILS # BLD: 0.04 K/UL (ref 0–0.12)
BUN SERPL-MCNC: 18 MG/DL (ref 8–22)
CALCIUM SERPL-MCNC: 9.6 MG/DL (ref 8.5–10.5)
CHLORIDE SERPL-SCNC: 100 MMOL/L (ref 96–112)
CO2 SERPL-SCNC: 23 MMOL/L (ref 20–33)
CREAT SERPL-MCNC: 0.72 MG/DL (ref 0.5–1.4)
EOSINOPHIL # BLD AUTO: 0.1 K/UL (ref 0–0.51)
EOSINOPHIL NFR BLD: 0.9 % (ref 0–6.9)
ERYTHROCYTE [DISTWIDTH] IN BLOOD BY AUTOMATED COUNT: 46.2 FL (ref 35.9–50)
GLUCOSE SERPL-MCNC: 107 MG/DL (ref 65–99)
HCT VFR BLD AUTO: 44.7 % (ref 42–52)
HGB BLD-MCNC: 14.7 G/DL (ref 14–18)
IMM GRANULOCYTES # BLD AUTO: 0.06 K/UL (ref 0–0.11)
IMM GRANULOCYTES NFR BLD AUTO: 0.5 % (ref 0–0.9)
LYMPHOCYTES # BLD AUTO: 0.79 K/UL (ref 1–4.8)
LYMPHOCYTES NFR BLD: 7.2 % (ref 22–41)
MCH RBC QN AUTO: 30.1 PG (ref 27–33)
MCHC RBC AUTO-ENTMCNC: 32.9 G/DL (ref 33.7–35.3)
MCV RBC AUTO: 91.6 FL (ref 81.4–97.8)
MONOCYTES # BLD AUTO: 0.93 K/UL (ref 0–0.85)
MONOCYTES NFR BLD AUTO: 8.5 % (ref 0–13.4)
NEUTROPHILS # BLD AUTO: 9 K/UL (ref 1.82–7.42)
NEUTROPHILS NFR BLD: 82.5 % (ref 44–72)
NRBC # BLD AUTO: 0 K/UL
NRBC BLD-RTO: 0 /100 WBC
PLATELET # BLD AUTO: 307 K/UL (ref 164–446)
PMV BLD AUTO: 8.9 FL (ref 9–12.9)
POTASSIUM SERPL-SCNC: 4 MMOL/L (ref 3.6–5.5)
RBC # BLD AUTO: 4.88 M/UL (ref 4.7–6.1)
SODIUM SERPL-SCNC: 135 MMOL/L (ref 135–145)
WBC # BLD AUTO: 10.9 K/UL (ref 4.8–10.8)

## 2021-03-28 PROCEDURE — A9270 NON-COVERED ITEM OR SERVICE: HCPCS | Performed by: INTERNAL MEDICINE

## 2021-03-28 PROCEDURE — 700102 HCHG RX REV CODE 250 W/ 637 OVERRIDE(OP): Performed by: INTERNAL MEDICINE

## 2021-03-28 PROCEDURE — 700102 HCHG RX REV CODE 250 W/ 637 OVERRIDE(OP): Performed by: STUDENT IN AN ORGANIZED HEALTH CARE EDUCATION/TRAINING PROGRAM

## 2021-03-28 PROCEDURE — 80048 BASIC METABOLIC PNL TOTAL CA: CPT

## 2021-03-28 PROCEDURE — A9270 NON-COVERED ITEM OR SERVICE: HCPCS | Performed by: STUDENT IN AN ORGANIZED HEALTH CARE EDUCATION/TRAINING PROGRAM

## 2021-03-28 PROCEDURE — 36415 COLL VENOUS BLD VENIPUNCTURE: CPT

## 2021-03-28 PROCEDURE — G0378 HOSPITAL OBSERVATION PER HR: HCPCS

## 2021-03-28 PROCEDURE — 99214 OFFICE O/P EST MOD 30 MIN: CPT | Performed by: STUDENT IN AN ORGANIZED HEALTH CARE EDUCATION/TRAINING PROGRAM

## 2021-03-28 PROCEDURE — 85025 COMPLETE CBC W/AUTO DIFF WBC: CPT

## 2021-03-28 RX ORDER — QUETIAPINE FUMARATE 25 MG/1
25 TABLET, FILM COATED ORAL 2 TIMES DAILY
Status: DISCONTINUED | OUTPATIENT
Start: 2021-03-28 | End: 2021-03-31 | Stop reason: HOSPADM

## 2021-03-28 RX ADMIN — GABAPENTIN 100 MG: 100 CAPSULE ORAL at 11:42

## 2021-03-28 RX ADMIN — TAMSULOSIN HYDROCHLORIDE 0.4 MG: 0.4 CAPSULE ORAL at 04:23

## 2021-03-28 RX ADMIN — OXYCODONE 5 MG: 5 TABLET ORAL at 07:43

## 2021-03-28 RX ADMIN — DOCUSATE SODIUM 50 MG AND SENNOSIDES 8.6 MG 2 TABLET: 8.6; 5 TABLET, FILM COATED ORAL at 18:18

## 2021-03-28 RX ADMIN — DOCUSATE SODIUM 50 MG AND SENNOSIDES 8.6 MG 2 TABLET: 8.6; 5 TABLET, FILM COATED ORAL at 04:23

## 2021-03-28 RX ADMIN — GABAPENTIN 100 MG: 100 CAPSULE ORAL at 18:18

## 2021-03-28 RX ADMIN — DONEPEZIL HYDROCHLORIDE 10 MG: 5 TABLET, FILM COATED ORAL at 04:22

## 2021-03-28 RX ADMIN — QUETIAPINE FUMARATE 50 MG: 25 TABLET ORAL at 04:23

## 2021-03-28 RX ADMIN — QUETIAPINE FUMARATE 25 MG: 25 TABLET ORAL at 18:18

## 2021-03-28 RX ADMIN — GABAPENTIN 100 MG: 100 CAPSULE ORAL at 04:23

## 2021-03-28 ASSESSMENT — ENCOUNTER SYMPTOMS
CHILLS: 0
HEMOPTYSIS: 0
NECK PAIN: 0
SENSORY CHANGE: 0
BLURRED VISION: 0
DIAPHORESIS: 0
COUGH: 0
PALPITATIONS: 0
FLANK PAIN: 0
TREMORS: 0
NAUSEA: 0
FOCAL WEAKNESS: 0
SHORTNESS OF BREATH: 0
BRUISES/BLEEDS EASILY: 0
POLYDIPSIA: 0
SORE THROAT: 0
EYE DISCHARGE: 0
DIARRHEA: 0
SPEECH CHANGE: 0
EYE REDNESS: 0
ABDOMINAL PAIN: 0
EYE PAIN: 0
DEPRESSION: 0
BACK PAIN: 0
HEADACHES: 0
FEVER: 0
SPUTUM PRODUCTION: 0
DIZZINESS: 0
LOSS OF CONSCIOUSNESS: 0
CLAUDICATION: 0
VOMITING: 0
CONSTIPATION: 0
MYALGIAS: 0

## 2021-03-28 ASSESSMENT — PAIN DESCRIPTION - PAIN TYPE: TYPE: ACUTE PAIN

## 2021-03-28 ASSESSMENT — PAIN SCALES - WONG BAKER: WONGBAKER_NUMERICALRESPONSE: DOESN'T HURT AT ALL

## 2021-03-28 NOTE — CARE PLAN
Problem: Pain Management  Goal: Pain level will decrease to patient's comfort goal  Outcome: PROGRESSING SLOWER THAN EXPECTED   Patient will call when in pain,pain medication will be given as needed.

## 2021-03-28 NOTE — PROGRESS NOTES
[facility] Medicine Daily Progress Note    Date of Service  3/28/2021    Chief Complaint  75 y.o. male admitted 3/22/2021 with right leg pain    Hospital Course  75 y.o. male with a past medical history of BPH, COPD, dyslipidemia, vascular dementia, asthma who presented 3/22/2021 with right leg pain for the past 3 days.  Patient reports sharp pain that starts from his right hip and radiates down to his right foot.  He reports he woke up with severe pain that was limiting his ability to ambulate and resulted in a fall.  He denied hitting his head or any loss of consciousness..  It is not exacerbated by movement.  He reports his skin is extremely sensitive to touch.  He denies any fevers, chills, focal weakness, numbness or tingling.  He denies any upper extremity pain.  In the ER he was noted to have an elevated CPK.  The patient does take atorvastatin.  He was admitted on 3/14 for similar symptoms and underwent x-ray of his right hip that was negative for fracture and ultrasound right lower extremity that was negative for DVT.      Interval Problem Update  I evaluated and examined the patient at the bedside.  Labs and imaging were reviewed. Care plan was discussed with the patient and bedside nurse.    Patient seems lethargic and drowsy today with eyes closed while being feeding with the assistant.  No agitation episodes last night.  Will decrease Seroquel to 25 mg.     1 episode of low grade fever yesterday afternoon.  WBC 10.9.  No identified infection source, will continue to monitor    Patient received morphine and oxycodone this a.m. for right hip pain.  The time when I evaluated him, he stated no pain.     Talked with , the neurosurgeon, recommended to discontinue Plavix and Lovenox given possible minimal epidural hemorrhage.  Recommended outpatient follow-up.    Accepted by SNF.      3/27 Talked to the daughter on the phone, updated her with the medical plans.  Voiced understanding and happy for the  patient to go to SNF    Consultants/Specialty      Code Status  Full Code    Disposition  [facility]    Review of Systems  Review of Systems   Constitutional: Negative for chills, diaphoresis, fever and malaise/fatigue.   HENT: Negative for sore throat.    Eyes: Negative for blurred vision, pain, discharge and redness.   Respiratory: Negative for cough, hemoptysis, sputum production and shortness of breath.    Cardiovascular: Negative for chest pain, palpitations, claudication and leg swelling.   Gastrointestinal: Negative for abdominal pain, constipation, diarrhea, nausea and vomiting.   Genitourinary: Negative for dysuria, flank pain, frequency, hematuria and urgency.   Musculoskeletal: Negative for back pain, joint pain, myalgias and neck pain.        R leg pain   Skin: Negative for itching.   Neurological: Negative for dizziness, tremors, sensory change, speech change, focal weakness, loss of consciousness and headaches.   Endo/Heme/Allergies: Negative for polydipsia. Does not bruise/bleed easily.   Psychiatric/Behavioral: Negative for depression.        Physical Exam  Temp:  [36.5 °C (97.7 °F)-38.2 °C (100.7 °F)] 36.5 °C (97.7 °F)  Pulse:  [81-97] 90  Resp:  [16-18] 18  BP: (120-136)/(57-73) 120/57  SpO2:  [94 %-97 %] 96 %    Physical Exam  Constitutional:       General: He is not in acute distress.     Appearance: Normal appearance. He is not diaphoretic.   HENT:      Head: Normocephalic and atraumatic.      Nose: Nose normal.      Mouth/Throat:      Pharynx: Oropharynx is clear. No oropharyngeal exudate or posterior oropharyngeal erythema.   Eyes:      General:         Right eye: No discharge.         Left eye: No discharge.   Cardiovascular:      Rate and Rhythm: Normal rate and regular rhythm.      Pulses: Normal pulses.      Heart sounds: Normal heart sounds. No gallop.    Pulmonary:      Effort: Pulmonary effort is normal. No respiratory distress.      Breath sounds: Normal breath sounds. No stridor. No  wheezing, rhonchi or rales.   Chest:      Chest wall: No tenderness.   Abdominal:      General: Abdomen is flat. Bowel sounds are normal. There is no distension.      Palpations: Abdomen is soft.      Tenderness: There is no abdominal tenderness. There is no guarding or rebound.   Musculoskeletal:         General: No swelling, tenderness, deformity or signs of injury. Normal range of motion.      Cervical back: No rigidity. No muscular tenderness.      Right lower leg: No edema.      Left lower leg: No edema.   Neurological:      General: No focal deficit present.      Mental Status: He is alert and oriented to person, place, and time. Mental status is at baseline.      Sensory: No sensory deficit.      Motor: No weakness.      Gait: Gait normal.   Psychiatric:         Mood and Affect: Mood normal.         Fluids  No intake or output data in the 24 hours ending 03/28/21 1000    Laboratory  Recent Labs     03/26/21  0558 03/27/21  0146 03/28/21  0151   WBC 7.3 10.8 10.9*   RBC 4.58* 4.82 4.88   HEMOGLOBIN 13.7* 14.3 14.7   HEMATOCRIT 42.0 44.3 44.7   MCV 91.7 91.9 91.6   MCH 29.9 29.7 30.1   MCHC 32.6* 32.3* 32.9*   RDW 46.7 46.2 46.2   PLATELETCT 332 329 307   MPV 8.8* 9.1 8.9*     Recent Labs     03/26/21  0558 03/27/21  0146 03/28/21  0151   SODIUM 140 138 135   POTASSIUM 3.9 4.1 4.0   CHLORIDE 105 104 100   CO2 27 24 23   GLUCOSE 96 102* 107*   BUN 15 17 18   CREATININE 0.85 0.76 0.72   CALCIUM 9.5 9.6 9.6                   Imaging  MR-LUMBAR SPINE-WITH   Final Result      1.  The postgadolinium sequences demonstrates mild epidural contrast enhancement at the levels of L4, L5 and S1 . There is no well-defined fluid collection. This is still a nonspecific finding. Since the patient does not have any increased WBC count and    there is history of recent ground-level fall, the possibility of minimal epidural hemorrhage is favored over the infection.   2.  There is no MR evidence of discitis, osteomyelitis or soft  tissue infection.      MR-LUMBAR SPINE-W/O   Final Result      1.  There is ill-defined epidural space at the levels of L3, L4, L5 and S1. This is concerning for epidural pathology such as fluid collection/hemorrhage. Contrast-enhanced study is recommended to rule out any epidural abscess. There is no significant    canal compromise.   2.  Degenerative disease as described above.      DX-SKULL-LIMITED 3-   Final Result      There is metallic denture.   No radiopaque foreign body identified.      DX-CHEST-PORTABLE (1 VIEW)   Final Result         1. No radiopaque foreign body identified.      [id number] VIEW   Final Result         No radiopaque foreign body identified.      DX-TIBIA AND FIBULA RIGHT   Final Result      No fracture of RIGHT lower leg.      DX-FEMUR-2+ RIGHT   Final Result      No RIGHT femur fracture.           Assessment/Plan  * Acute pain of right lower extremity- (present on admission)  Assessment & Plan  No evidence of fracture or DVT on imaging  ESR, CRP, b12, TSH normal     MRI showed L4-5, L5-S1 diffuse disc bulging, and possible minimal epidural hemorrhage at the levels of L3-S1 given recent fall history.    -Pain resolved  - Since this is the second admission in 1 month for the same reason, will consult neurosurgery for further recommendations.   -Talked with , the neurosurgeon, recommended to discontinue Plavix and Lovenox (ppx dose) given possible minimal epidural hemorrhage.  Recommended outpatient follow-up.        Delirium  Assessment & Plan  3/25, 3/26  Episodes of confusion and agitation last night, was given Haldol, Seroquel increased to 50 mg per night shift    3/28 Patient seems lethargic and drowsy.  No agitation episodes last night.  Will decrease Seroquel to 25 mg.     Elevated CPK- (present on admission)  Assessment & Plan  Secondary to trauma versus statin  Hold statin for now  Trend CPK    Benign prostatic hyperplasia with urinary frequency- (present on  admission)  Assessment & Plan  Continue Flomax    Vascular dementia with behavior disturbance (HCC)- (present on admission)  Assessment & Plan  Continue Aricept and Seroquel    COPD (chronic obstructive pulmonary disease) (HCC)- (present on admission)  Assessment & Plan  Not in acute exacerbation  Continue home regimen of inhalers  RT protocol       VTE prophylaxis: lovenox

## 2021-03-28 NOTE — PROGRESS NOTES
Assumed care at 0645. Patient in bed, alert to self only. Patient following simple commands. Restless at times. Tele sitter in place.Patietn grossly incontinent of urine. Complete bed change done. Will continue to monitor for additional needs.

## 2021-03-29 ENCOUNTER — APPOINTMENT (OUTPATIENT)
Dept: RADIOLOGY | Facility: MEDICAL CENTER | Age: 76
End: 2021-03-29
Attending: STUDENT IN AN ORGANIZED HEALTH CARE EDUCATION/TRAINING PROGRAM
Payer: MEDICARE

## 2021-03-29 LAB
ANION GAP SERPL CALC-SCNC: 9 MMOL/L (ref 7–16)
BASOPHILS # BLD AUTO: 0.4 % (ref 0–1.8)
BASOPHILS # BLD: 0.04 K/UL (ref 0–0.12)
BUN SERPL-MCNC: 25 MG/DL (ref 8–22)
CALCIUM SERPL-MCNC: 9.7 MG/DL (ref 8.5–10.5)
CHLORIDE SERPL-SCNC: 100 MMOL/L (ref 96–112)
CO2 SERPL-SCNC: 26 MMOL/L (ref 20–33)
CREAT SERPL-MCNC: 0.8 MG/DL (ref 0.5–1.4)
EOSINOPHIL # BLD AUTO: 0.12 K/UL (ref 0–0.51)
EOSINOPHIL NFR BLD: 1.1 % (ref 0–6.9)
ERYTHROCYTE [DISTWIDTH] IN BLOOD BY AUTOMATED COUNT: 45.9 FL (ref 35.9–50)
GLUCOSE SERPL-MCNC: 114 MG/DL (ref 65–99)
HCT VFR BLD AUTO: 42.7 % (ref 42–52)
HGB BLD-MCNC: 14.1 G/DL (ref 14–18)
IMM GRANULOCYTES # BLD AUTO: 0.05 K/UL (ref 0–0.11)
IMM GRANULOCYTES NFR BLD AUTO: 0.5 % (ref 0–0.9)
LYMPHOCYTES # BLD AUTO: 0.98 K/UL (ref 1–4.8)
LYMPHOCYTES NFR BLD: 9 % (ref 22–41)
MCH RBC QN AUTO: 30.1 PG (ref 27–33)
MCHC RBC AUTO-ENTMCNC: 33 G/DL (ref 33.7–35.3)
MCV RBC AUTO: 91 FL (ref 81.4–97.8)
MONOCYTES # BLD AUTO: 1.17 K/UL (ref 0–0.85)
MONOCYTES NFR BLD AUTO: 10.7 % (ref 0–13.4)
NEUTROPHILS # BLD AUTO: 8.54 K/UL (ref 1.82–7.42)
NEUTROPHILS NFR BLD: 78.3 % (ref 44–72)
NRBC # BLD AUTO: 0 K/UL
NRBC BLD-RTO: 0 /100 WBC
PLATELET # BLD AUTO: 315 K/UL (ref 164–446)
PMV BLD AUTO: 9.1 FL (ref 9–12.9)
POTASSIUM SERPL-SCNC: 3.8 MMOL/L (ref 3.6–5.5)
RBC # BLD AUTO: 4.69 M/UL (ref 4.7–6.1)
SODIUM SERPL-SCNC: 135 MMOL/L (ref 135–145)
WBC # BLD AUTO: 10.9 K/UL (ref 4.8–10.8)

## 2021-03-29 PROCEDURE — A9270 NON-COVERED ITEM OR SERVICE: HCPCS | Performed by: STUDENT IN AN ORGANIZED HEALTH CARE EDUCATION/TRAINING PROGRAM

## 2021-03-29 PROCEDURE — 80048 BASIC METABOLIC PNL TOTAL CA: CPT

## 2021-03-29 PROCEDURE — 700102 HCHG RX REV CODE 250 W/ 637 OVERRIDE(OP): Performed by: STUDENT IN AN ORGANIZED HEALTH CARE EDUCATION/TRAINING PROGRAM

## 2021-03-29 PROCEDURE — 94760 N-INVAS EAR/PLS OXIMETRY 1: CPT

## 2021-03-29 PROCEDURE — 36415 COLL VENOUS BLD VENIPUNCTURE: CPT

## 2021-03-29 PROCEDURE — 85025 COMPLETE CBC W/AUTO DIFF WBC: CPT

## 2021-03-29 PROCEDURE — G0378 HOSPITAL OBSERVATION PER HR: HCPCS

## 2021-03-29 PROCEDURE — 700102 HCHG RX REV CODE 250 W/ 637 OVERRIDE(OP): Performed by: INTERNAL MEDICINE

## 2021-03-29 PROCEDURE — 99214 OFFICE O/P EST MOD 30 MIN: CPT | Performed by: STUDENT IN AN ORGANIZED HEALTH CARE EDUCATION/TRAINING PROGRAM

## 2021-03-29 PROCEDURE — 97530 THERAPEUTIC ACTIVITIES: CPT

## 2021-03-29 PROCEDURE — 70450 CT HEAD/BRAIN W/O DYE: CPT | Mod: MG

## 2021-03-29 PROCEDURE — A9270 NON-COVERED ITEM OR SERVICE: HCPCS | Performed by: INTERNAL MEDICINE

## 2021-03-29 PROCEDURE — 97535 SELF CARE MNGMENT TRAINING: CPT

## 2021-03-29 PROCEDURE — 94640 AIRWAY INHALATION TREATMENT: CPT

## 2021-03-29 RX ORDER — CHOLECALCIFEROL (VITAMIN D3) 125 MCG
5 CAPSULE ORAL NIGHTLY
Status: DISCONTINUED | OUTPATIENT
Start: 2021-03-29 | End: 2021-03-31 | Stop reason: HOSPADM

## 2021-03-29 RX ADMIN — DOCUSATE SODIUM 50 MG AND SENNOSIDES 8.6 MG 2 TABLET: 8.6; 5 TABLET, FILM COATED ORAL at 17:57

## 2021-03-29 RX ADMIN — DONEPEZIL HYDROCHLORIDE 10 MG: 5 TABLET, FILM COATED ORAL at 04:12

## 2021-03-29 RX ADMIN — BUDESONIDE AND FORMOTEROL FUMARATE DIHYDRATE 2 PUFF: 160; 4.5 AEROSOL RESPIRATORY (INHALATION) at 08:59

## 2021-03-29 RX ADMIN — TAMSULOSIN HYDROCHLORIDE 0.4 MG: 0.4 CAPSULE ORAL at 04:12

## 2021-03-29 RX ADMIN — QUETIAPINE FUMARATE 25 MG: 25 TABLET ORAL at 17:57

## 2021-03-29 RX ADMIN — ACETAMINOPHEN 650 MG: 325 TABLET, FILM COATED ORAL at 17:57

## 2021-03-29 RX ADMIN — GABAPENTIN 100 MG: 100 CAPSULE ORAL at 17:57

## 2021-03-29 RX ADMIN — QUETIAPINE FUMARATE 25 MG: 25 TABLET ORAL at 04:12

## 2021-03-29 RX ADMIN — Medication 5 MG: at 20:52

## 2021-03-29 RX ADMIN — DOCUSATE SODIUM 50 MG AND SENNOSIDES 8.6 MG 2 TABLET: 8.6; 5 TABLET, FILM COATED ORAL at 04:12

## 2021-03-29 RX ADMIN — GABAPENTIN 100 MG: 100 CAPSULE ORAL at 11:54

## 2021-03-29 RX ADMIN — GABAPENTIN 100 MG: 100 CAPSULE ORAL at 04:12

## 2021-03-29 ASSESSMENT — ENCOUNTER SYMPTOMS
CONSTIPATION: 0
EYE REDNESS: 0
CLAUDICATION: 0
SENSORY CHANGE: 0
SORE THROAT: 0
TREMORS: 0
LOSS OF CONSCIOUSNESS: 0
DIARRHEA: 0
SPEECH CHANGE: 0
BACK PAIN: 0
FEVER: 0
NECK PAIN: 0
COUGH: 0
EYE PAIN: 0
VOMITING: 0
NAUSEA: 0
EYE DISCHARGE: 0
DEPRESSION: 0
FOCAL WEAKNESS: 0
CHILLS: 0
HEADACHES: 0
DIAPHORESIS: 0
PALPITATIONS: 0
BRUISES/BLEEDS EASILY: 0
BLURRED VISION: 0
SPUTUM PRODUCTION: 0
ABDOMINAL PAIN: 0
SHORTNESS OF BREATH: 0
MYALGIAS: 0
HEMOPTYSIS: 0
POLYDIPSIA: 0
FLANK PAIN: 0
DIZZINESS: 0

## 2021-03-29 ASSESSMENT — COGNITIVE AND FUNCTIONAL STATUS - GENERAL
EATING MEALS: A LOT
SUGGESTED CMS G CODE MODIFIER MOBILITY: CN
CLIMB 3 TO 5 STEPS WITH RAILING: TOTAL
DRESSING REGULAR LOWER BODY CLOTHING: A LOT
STANDING UP FROM CHAIR USING ARMS: TOTAL
MOVING FROM LYING ON BACK TO SITTING ON SIDE OF FLAT BED: UNABLE
TOILETING: A LOT
SUGGESTED CMS G CODE MODIFIER DAILY ACTIVITY: CL
MOVING TO AND FROM BED TO CHAIR: UNABLE
WALKING IN HOSPITAL ROOM: TOTAL
PERSONAL GROOMING: A LOT
MOBILITY SCORE: 6
HELP NEEDED FOR BATHING: A LOT
DAILY ACTIVITIY SCORE: 12
DRESSING REGULAR UPPER BODY CLOTHING: A LOT
TURNING FROM BACK TO SIDE WHILE IN FLAT BAD: UNABLE

## 2021-03-29 ASSESSMENT — GAIT ASSESSMENTS
ASSISTIVE DEVICE: HAND HELD ASSIST
GAIT LEVEL OF ASSIST: UNABLE TO PARTICIPATE

## 2021-03-29 NOTE — THERAPY
Physical Therapy   Daily Treatment     Patient Name: Remi Matt  Age:  75 y.o., Sex:  male  Medical Record #: [id number]  Today's Date: 3/29/2021     Precautions: Fall Risk    Assessment    Pt with significant decline in function since initial eval. Pt extremely confused and difficult to re-direct. Very rigid with tremors noted. Pt presenting with parkinsonian symptoms, rigidity in all limbs, tremors in UE, and bradykinesia. Strong posterior lean when EOB. Did attempt stand, therapist leg required to separate patient's legs (stepping on own feet). Very fearful and max assist to come to full stand. Continue to recommend post acute placement. Acute PT to follow.    Plan    Continue current treatment plan.    DC Equipment Recommendations: Unable to determine at this time  Discharge Recommendations: Recommend post-acute placement for additional physical therapy services prior to discharge home        Objective       03/29/21 1043   Cognition    Level of Consciousness Confused   Ability To Follow Commands 1 Step   Comments extremely confused, thinks at home, frequent cues to re-direct   Balance   Sitting Balance (Static) Trace +   Sitting Balance (Dynamic) Trace   Standing Balance (Static) Trace   Standing Balance (Dynamic) Dependent   Weight Shift Sitting Poor   Weight Shift Standing Absent   Comments Strong posterior lean, extremely rigid   Gait Analysis   Gait Level Of Assist Unable to Participate   Assistive Device Hand Held Assist   Bed Mobility    Supine to Sit Maximal Assist   Sit to Supine Maximal Assist   Scooting Maximal Assist   Functional Mobility   Sit to Stand Maximal Assist   Bed, Chair, Wheelchair Transfer Unable to Participate   Short Term Goals    Short Term Goal # 1 Pt will be able to perform sit<>stand/transfers with FWW with Spv in 6tx to promote fnx progression towards I    Goal Outcome # 1 goal not met   Short Term Goal # 2 Pt will be able to ambulate 150ft with FWW with Spv in 6tx to  promote fnx progression towards I    Goal Outcome # 2 Goal not met   Short Term Goal # 3 Pt will perform supine <> sit with SPV in 6 visits to get in/out of bed   Goal Outcome # 3 Goal not met

## 2021-03-29 NOTE — PROGRESS NOTES
Pt was only oriented to person during the night and did not sleep. He was very confused, was convinced his daughter was in the room and they were painting the room together. Pt needed to be reminded to stay in bed multiple times throughout the night

## 2021-03-29 NOTE — DISCHARGE PLANNING
Anticipated Discharge Disposition:   SNF    Action:   Discussed discharge planning needs during rounds. Per MD, pt is not medially cleared, pending CT Scan. Per MD, pt has a daughter (not biological, pt's girlfriend's daughter) reported that pt has a sister who has POA.     RN CM called pt's daughter, April, on file. Per April, she is pt's biological daughter, and is willing to make decisions if needed. Per April, she is agreeable for plan to pt to discharge to SNF. She reported pt does have a sister named Christina but she does not have any contact information. April reported pt and her mother were , and she does not have a good relationship with pt and his side of the family.    RN NIC called pt's mother, Amarilis. Received contact information for pt's sister, Christina Preciado ([phone]). SAAD LUCERO called Christina. Per Christina, pt has a history of dementia. Pt lives with his girlfriend, China Leo, and her two daughters, Danielle and Gissel.    Pt's sister, Christina, said she has POA for pt and will fax paperwork to this RN CM. Received choice for SNF. Per Christina, she is agreeable for pt to go to SNF when medically cleared.     Addendum:   Received POA paperwork from Christina. SADAF to upload in Epic. MD and bedside RN updated via [employer].    Barriers to Discharge:   Medical clearance  POA paperwork from pt's sister    Plan:   Hospital Care Management will continue to follow and assist with discharge planning needs.

## 2021-03-29 NOTE — CARE PLAN
Problem: Fluid Volume:  Goal: Will maintain balanced intake and output  Outcome: PROGRESSING AS EXPECTED     Problem: Communication  Goal: The ability to communicate needs accurately and effectively will improve  Outcome: PROGRESSING AS EXPECTED     Problem: Safety  Goal: Will remain free from injury  Outcome: PROGRESSING AS EXPECTED  Goal: Will remain free from falls  Outcome: PROGRESSING AS EXPECTED       Patient will  remain  free from falls,Bed alarm on, tele sitter, hourly rounding and pain management as needed.

## 2021-03-29 NOTE — PROGRESS NOTES
"Hospital Medicine Daily Progress Note    Date of Service  3/29/2021    Chief Complaint  75 y.o. male admitted 3/22/2021 with right leg pain    Hospital Course  75 y.o. male with a past medical history of BPH, COPD, dyslipidemia, vascular dementia, asthma who presented 3/22/2021 with right leg pain for the past 3 days.  Patient reports sharp pain that starts from his right hip and radiates down to his right foot.  He reports he woke up with severe pain that was limiting his ability to ambulate and resulted in a fall.  He denied hitting his head or any loss of consciousness..  It is not exacerbated by movement.  He reports his skin is extremely sensitive to touch.  He denies any fevers, chills, focal weakness, numbness or tingling.  He denies any upper extremity pain.  In the ER he was noted to have an elevated CPK.  The patient does take atorvastatin.  He was admitted on 3/14 for similar symptoms and underwent x-ray of his right hip that was negative for fracture and ultrasound right lower extremity that was negative for DVT.      Interval Problem Update  I evaluated and examined the patient at the bedside.  Labs and imaging were reviewed. Care plan was discussed with the patient and bedside nurse.    Patient is more confusing and with hallucination. Seroquel was decreased to home dose 25 mg.  CT head without contrast no acute pathology    Some social issue with the patient.  It appears the \"daughters\" are not the biological daughter.  the patient is the \"daughter's mom boyfriend  who live with for the past 30 yrs.    The sister is the POA, confirmed with the     Consultants/Specialty      Code Status  Full Code    Disposition  SNF    Review of Systems  Review of Systems   Constitutional: Negative for chills, diaphoresis, fever and malaise/fatigue.   HENT: Negative for sore throat.    Eyes: Negative for blurred vision, pain, discharge and redness.   Respiratory: Negative for cough, hemoptysis, sputum " production and shortness of breath.    Cardiovascular: Negative for chest pain, palpitations, claudication and leg swelling.   Gastrointestinal: Negative for abdominal pain, constipation, diarrhea, nausea and vomiting.   Genitourinary: Negative for dysuria, flank pain, frequency, hematuria and urgency.   Musculoskeletal: Negative for back pain, joint pain, myalgias and neck pain.        R leg pain   Skin: Negative for itching.   Neurological: Negative for dizziness, tremors, sensory change, speech change, focal weakness, loss of consciousness and headaches.   Endo/Heme/Allergies: Negative for polydipsia. Does not bruise/bleed easily.   Psychiatric/Behavioral: Negative for depression.        Physical Exam  Temp:  [36.6 °C (97.9 °F)-37.4 °C (99.4 °F)] 36.6 °C (97.9 °F)  Pulse:  [] 78  Resp:  [16-20] 16  BP: (127-149)/(64-78) 149/78  SpO2:  [93 %-98 %] 96 %    Physical Exam  Constitutional:       General: He is not in acute distress.     Appearance: Normal appearance. He is not diaphoretic.   HENT:      Head: Normocephalic and atraumatic.      Nose: Nose normal.      Mouth/Throat:      Pharynx: Oropharynx is clear. No oropharyngeal exudate or posterior oropharyngeal erythema.   Eyes:      General:         Right eye: No discharge.         Left eye: No discharge.   Cardiovascular:      Rate and Rhythm: Normal rate and regular rhythm.      Pulses: Normal pulses.      Heart sounds: Normal heart sounds. No gallop.    Pulmonary:      Effort: Pulmonary effort is normal. No respiratory distress.      Breath sounds: Normal breath sounds. No stridor. No wheezing, rhonchi or rales.   Chest:      Chest wall: No tenderness.   Abdominal:      General: Abdomen is flat. Bowel sounds are normal. There is no distension.      Palpations: Abdomen is soft.      Tenderness: There is no abdominal tenderness. There is no guarding or rebound.   Musculoskeletal:         General: No swelling, tenderness, deformity or signs of injury.  Normal range of motion.      Cervical back: No rigidity. No muscular tenderness.      Right lower leg: No edema.      Left lower leg: No edema.   Neurological:      General: No focal deficit present.      Mental Status: He is alert and oriented to person, place, and time. Mental status is at baseline.      Sensory: No sensory deficit.      Motor: No weakness.      Gait: Gait normal.   Psychiatric:         Mood and Affect: Mood normal.         Fluids    Intake/Output Summary (Last 24 hours) at 3/29/2021 1551  Last data filed at 3/29/2021 1400  Gross per 24 hour   Intake 797 ml   Output --   Net 797 ml       Laboratory  Recent Labs     03/27/21  0146 03/28/21  0151 03/29/21  0424   WBC 10.8 10.9* 10.9*   RBC 4.82 4.88 4.69*   HEMOGLOBIN 14.3 14.7 14.1   HEMATOCRIT 44.3 44.7 42.7   MCV 91.9 91.6 91.0   MCH 29.7 30.1 30.1   MCHC 32.3* 32.9* 33.0*   RDW 46.2 46.2 45.9   PLATELETCT 329 307 315   MPV 9.1 8.9* 9.1     Recent Labs     03/27/21  0146 03/28/21  0151 03/29/21  0424   SODIUM 138 135 135   POTASSIUM 4.1 4.0 3.8   CHLORIDE 104 100 100   CO2 24 23 26   GLUCOSE 102* 107* 114*   BUN 17 18 25*   CREATININE 0.76 0.72 0.80   CALCIUM 9.6 9.6 9.7                   Imaging  CT-HEAD W/O   Final Result      1.  No evidence of acute territorial infarct, intracranial hemorrhage or mass lesion.   2.  Moderate diffuse cerebral substance loss.   3.  At least moderate microangiopathic ischemic change versus demyelination or gliosis.      MR-LUMBAR SPINE-WITH   Final Result      1.  The postgadolinium sequences demonstrates mild epidural contrast enhancement at the levels of L4, L5 and S1 . There is no well-defined fluid collection. This is still a nonspecific finding. Since the patient does not have any increased WBC count and    there is history of recent ground-level fall, the possibility of minimal epidural hemorrhage is favored over the infection.   2.  There is no MR evidence of discitis, osteomyelitis or soft tissue  infection.      MR-LUMBAR SPINE-W/O   Final Result      1.  There is ill-defined epidural space at the levels of L3, L4, L5 and S1. This is concerning for epidural pathology such as fluid collection/hemorrhage. Contrast-enhanced study is recommended to rule out any epidural abscess. There is no significant    canal compromise.   2.  Degenerative disease as described above.      DX-SKULL-LIMITED 3-   Final Result      There is metallic denture.   No radiopaque foreign body identified.      DX-CHEST-PORTABLE (1 VIEW)   Final Result         1. No radiopaque foreign body identified.      [id number] VIEW   Final Result         No radiopaque foreign body identified.      DX-TIBIA AND FIBULA RIGHT   Final Result      No fracture of RIGHT lower leg.      DX-FEMUR-2+ RIGHT   Final Result      No RIGHT femur fracture.           Assessment/Plan  * Acute pain of right lower extremity- (present on admission)  Assessment & Plan  No evidence of fracture or DVT on imaging  ESR, CRP, b12, TSH normal     MRI showed L4-5, L5-S1 diffuse disc bulging, and possible minimal epidural hemorrhage at the levels of L3-S1 given recent fall history.    -Pain resolved  - Since this is the second admission in 1 month for the same reason, will consult neurosurgery for further recommendations.   -Talked with , the neurosurgeon, recommended to discontinue Plavix and Lovenox (ppx dose) given possible minimal epidural hemorrhage.  Recommended outpatient follow-up.        Delirium  Assessment & Plan   Patient is more confusing and with hallucination. Seroquel was decreased to home dose 25 mg.    CT head without contrast no acute pathology    Elevated CPK- (present on admission)  Assessment & Plan  Secondary to trauma versus statin  Hold statin for now  Trend CPK    Benign prostatic hyperplasia with urinary frequency- (present on admission)  Assessment & Plan  Continue Flomax    Vascular dementia with behavior disturbance (HCC)- (present on  admission)  Assessment & Plan  Continue Aricept and Seroquel    COPD (chronic obstructive pulmonary disease) (HCC)- (present on admission)  Assessment & Plan  Not in acute exacerbation  Continue home regimen of inhalers  RT protocol       VTE prophylaxis: lovenox

## 2021-03-29 NOTE — CARE PLAN
Spoke with pt and reminded him to use his call light as needed to reduce his risk of falls. Went over current plan of car with PT and he is satisfied.

## 2021-03-29 NOTE — THERAPY
"Occupational Therapy  Daily Treatment        Patient Name: Remi Matt  Age:  75 y.o., Sex:  male  Medical Record #: [id number]  Today's Date: 3/29/2021    Precautions: Fall Risk  Comments: impulsive, dementia, increased rigidity     Assessment  Pt has had a significant decline in his functional participation, pt has severely rigid posture and positions of all extremities, decreased coordination, little to no postural control and increased confusion. While pt has been AO to self, pt was perseverating on items not in his room today such as \"see my glasses over there I need my glasses\". Pt had c/o pain in BLE and had significantly diminished mobility and ADL participation RN aware.     Plan  Continue current treatment plan.    DC Equipment Recommendations: Unable to determine at this time  Discharge Recommendations: Recommend post-acute placement for additional occupational therapy services prior to discharge home    Subjective  \"My legs hurt don't touch them\"     Objective     03/29/21 1059   Pain 0 - 10 Group   Therapist Pain Assessment During Activity;Nurse Notified  (c/o BLE leg pain )   Cognition    Cognition / Consciousness X   Level of Consciousness Confused   Ability To Follow Commands 1 Step   Safety Awareness Impaired;Impulsive   New Learning Impaired   Sequencing Impaired   Comments has been AO to self, but was more confused today, perseverating on things not in the room, unable to motor plan simple tasks, not keeping his eyes open    Passive ROM Upper Body   Passive ROM Upper Body X   Comments rigid posture espeically proximally    Active ROM Upper Body   Active ROM Upper Body  X   Comments limited by rigidity    Strength Upper Body   Upper Body Strength  X   Gross Strength Generalized Weakness, Equal Bilaterally.    Upper Body Muscle Tone   Upper Body Muscle Tone  X   Rt Upper Extremity Muscle Tone Rigidity   Lt Upper Extremity Muscle Tone Rigidity   Other Treatments   Other Treatments Provided " Focused on seated ADL's postural control and UB dressing    Balance   Sitting Balance (Static) Poor -   Sitting Balance (Dynamic) Poor -   Standing Balance (Static) Trace   Standing Balance (Dynamic) Dependent   Weight Shift Sitting Absent   Weight Shift Standing Absent   Skilled Intervention Verbal Cuing;Tactile Cuing;Facilitation;Compensatory Strategies   Comments w/physical assist, significant posterior lean/push    Activities of Daily Living   Grooming Maximal Assist   Upper Body Dressing Maximal Assist   Lower Body Dressing Maximal Assist   Toileting Maximal Assist   Skilled Intervention Verbal Cuing;Tactile Cuing   Comments severly increased rigidity in all extremities decreased coordination and attention    How much help from another person does the patient currently need...   6 Clicks Daily Activity Score 12   Functional Mobility   Sit to Stand Maximal Assist   Bed, Chair, Wheelchair Transfer Unable to Participate   Toilet Transfers Unable to Participate   Mobility EOB sit>Stand BTB    Skilled Intervention Verbal Cuing;Tactile Cuing;Facilitation   Comments unable to walk this session    Visual Perception   Comments kept eyes closed through out session    Activity Tolerance   Comments c/o pain and fatigue    Patient / Family Goals   Patient / Family Goal #1 to go home    Goal #1 Outcome Goal not met   Short Term Goals   Short Term Goal # 1 pt will complete toilet txf w/spv    Goal Outcome # 1 Goal not met   Short Term Goal # 2 pt will complete FB dressing w/set up    Goal Outcome # 2 Goal not met   Short Term Goal # 3 pt will sit EOB midline    Education Group   Role of Occupational Therapist Patient Response Patient;No Learning Evidence   Anticipated Discharge Equipment and Recommendations   DC Equipment Recommendations Unable to determine at this time   Discharge Recommendations Recommend post-acute placement for additional occupational therapy services prior to discharge home   Interdisciplinary Plan of  Care Collaboration   IDT Collaboration with  Nursing   Patient Position at End of Therapy In Bed;Call Light within Reach;Tray Table within Reach;Phone within Reach;Bed Alarm On   Collaboration Comments RN aware of session and notable changes in status    Session Information   Date / Session Number  3/29 #2 (2/2, 3/30)   Priority 1

## 2021-03-30 LAB
ANION GAP SERPL CALC-SCNC: 10 MMOL/L (ref 7–16)
BASOPHILS # BLD AUTO: 0.5 % (ref 0–1.8)
BASOPHILS # BLD: 0.04 K/UL (ref 0–0.12)
BUN SERPL-MCNC: 22 MG/DL (ref 8–22)
CALCIUM SERPL-MCNC: 9.6 MG/DL (ref 8.5–10.5)
CHLORIDE SERPL-SCNC: 101 MMOL/L (ref 96–112)
CO2 SERPL-SCNC: 25 MMOL/L (ref 20–33)
CREAT SERPL-MCNC: 0.57 MG/DL (ref 0.5–1.4)
EOSINOPHIL # BLD AUTO: 0.05 K/UL (ref 0–0.51)
EOSINOPHIL NFR BLD: 0.6 % (ref 0–6.9)
ERYTHROCYTE [DISTWIDTH] IN BLOOD BY AUTOMATED COUNT: 44.6 FL (ref 35.9–50)
GLUCOSE SERPL-MCNC: 109 MG/DL (ref 65–99)
HCT VFR BLD AUTO: 42.9 % (ref 42–52)
HGB BLD-MCNC: 14.2 G/DL (ref 14–18)
IMM GRANULOCYTES # BLD AUTO: 0.03 K/UL (ref 0–0.11)
IMM GRANULOCYTES NFR BLD AUTO: 0.4 % (ref 0–0.9)
LYMPHOCYTES # BLD AUTO: 0.73 K/UL (ref 1–4.8)
LYMPHOCYTES NFR BLD: 8.8 % (ref 22–41)
MCH RBC QN AUTO: 29.9 PG (ref 27–33)
MCHC RBC AUTO-ENTMCNC: 33.1 G/DL (ref 33.7–35.3)
MCV RBC AUTO: 90.3 FL (ref 81.4–97.8)
MONOCYTES # BLD AUTO: 0.74 K/UL (ref 0–0.85)
MONOCYTES NFR BLD AUTO: 8.9 % (ref 0–13.4)
NEUTROPHILS # BLD AUTO: 6.71 K/UL (ref 1.82–7.42)
NEUTROPHILS NFR BLD: 80.8 % (ref 44–72)
NRBC # BLD AUTO: 0 K/UL
NRBC BLD-RTO: 0 /100 WBC
PLATELET # BLD AUTO: 322 K/UL (ref 164–446)
PMV BLD AUTO: 9 FL (ref 9–12.9)
POTASSIUM SERPL-SCNC: 4 MMOL/L (ref 3.6–5.5)
RBC # BLD AUTO: 4.75 M/UL (ref 4.7–6.1)
SODIUM SERPL-SCNC: 136 MMOL/L (ref 135–145)
WBC # BLD AUTO: 8.3 K/UL (ref 4.8–10.8)

## 2021-03-30 PROCEDURE — 94640 AIRWAY INHALATION TREATMENT: CPT

## 2021-03-30 PROCEDURE — A9270 NON-COVERED ITEM OR SERVICE: HCPCS | Performed by: INTERNAL MEDICINE

## 2021-03-30 PROCEDURE — 700102 HCHG RX REV CODE 250 W/ 637 OVERRIDE(OP): Performed by: INTERNAL MEDICINE

## 2021-03-30 PROCEDURE — 80048 BASIC METABOLIC PNL TOTAL CA: CPT

## 2021-03-30 PROCEDURE — G0378 HOSPITAL OBSERVATION PER HR: HCPCS

## 2021-03-30 PROCEDURE — 700102 HCHG RX REV CODE 250 W/ 637 OVERRIDE(OP): Performed by: STUDENT IN AN ORGANIZED HEALTH CARE EDUCATION/TRAINING PROGRAM

## 2021-03-30 PROCEDURE — A9270 NON-COVERED ITEM OR SERVICE: HCPCS | Performed by: STUDENT IN AN ORGANIZED HEALTH CARE EDUCATION/TRAINING PROGRAM

## 2021-03-30 PROCEDURE — 36415 COLL VENOUS BLD VENIPUNCTURE: CPT

## 2021-03-30 PROCEDURE — 99226 PR SUBSEQUENT OBSERVATION CARE,LEVEL III: CPT | Performed by: HOSPITALIST

## 2021-03-30 PROCEDURE — 85025 COMPLETE CBC W/AUTO DIFF WBC: CPT

## 2021-03-30 PROCEDURE — 94760 N-INVAS EAR/PLS OXIMETRY 1: CPT

## 2021-03-30 PROCEDURE — 700101 HCHG RX REV CODE 250: Performed by: HOSPITALIST

## 2021-03-30 RX ORDER — LIDOCAINE 50 MG/G
1 PATCH TOPICAL EVERY 24 HOURS
Status: DISCONTINUED | OUTPATIENT
Start: 2021-03-30 | End: 2021-03-31 | Stop reason: HOSPADM

## 2021-03-30 RX ADMIN — GABAPENTIN 100 MG: 100 CAPSULE ORAL at 17:30

## 2021-03-30 RX ADMIN — BUDESONIDE AND FORMOTEROL FUMARATE DIHYDRATE 2 PUFF: 160; 4.5 AEROSOL RESPIRATORY (INHALATION) at 09:45

## 2021-03-30 RX ADMIN — GABAPENTIN 100 MG: 100 CAPSULE ORAL at 12:05

## 2021-03-30 RX ADMIN — DOCUSATE SODIUM 50 MG AND SENNOSIDES 8.6 MG 2 TABLET: 8.6; 5 TABLET, FILM COATED ORAL at 17:30

## 2021-03-30 RX ADMIN — OXYCODONE 5 MG: 5 TABLET ORAL at 20:27

## 2021-03-30 RX ADMIN — Medication 5 MG: at 20:28

## 2021-03-30 RX ADMIN — QUETIAPINE FUMARATE 25 MG: 25 TABLET ORAL at 17:30

## 2021-03-30 RX ADMIN — LIDOCAINE 1 PATCH: 50 PATCH TOPICAL at 13:39

## 2021-03-30 ASSESSMENT — ENCOUNTER SYMPTOMS
NECK PAIN: 0
EYE PAIN: 0
POLYDIPSIA: 0
HALLUCINATIONS: 1
SHORTNESS OF BREATH: 0
TREMORS: 0
FOCAL WEAKNESS: 0
EYE REDNESS: 0
ABDOMINAL PAIN: 0
HEMOPTYSIS: 0
CHILLS: 0
BACK PAIN: 0
EYE DISCHARGE: 0
DIZZINESS: 0
CONSTIPATION: 0
MYALGIAS: 0
CLAUDICATION: 0
DIAPHORESIS: 0
BRUISES/BLEEDS EASILY: 0
BLURRED VISION: 0
FEVER: 0
VOMITING: 0
SORE THROAT: 0
PALPITATIONS: 0
FLANK PAIN: 0
SPEECH CHANGE: 0
COUGH: 0
DEPRESSION: 0
NAUSEA: 0
SENSORY CHANGE: 0
SPUTUM PRODUCTION: 0
DIARRHEA: 0
LOSS OF CONSCIOUSNESS: 0
HEADACHES: 0

## 2021-03-30 ASSESSMENT — PAIN DESCRIPTION - PAIN TYPE: TYPE: ACUTE PAIN

## 2021-03-30 NOTE — CARE PLAN
Problem: Pain Management  Goal: Pain level will decrease to patient's comfort goal  Outcome: PROGRESSING AS EXPECTED     Problem: Communication  Goal: The ability to communicate needs accurately and effectively will improve  Outcome: PROGRESSING SLOWER THAN EXPECTED     Problem: Safety  Goal: Will remain free from injury  Outcome: PROGRESSING SLOWER THAN EXPECTED  Goal: Will remain free from falls  Outcome: PROGRESSING SLOWER THAN EXPECTED     Problem: Urinary Elimination:  Goal: Ability to reestablish a normal urinary elimination pattern will improve  Outcome: PROGRESSING SLOWER THAN EXPECTED       Patient will remain free from injury during shift.Tele sitter in place, Bed alarm on ,hourly rounding, pain management, hydration maintained.

## 2021-03-30 NOTE — PROGRESS NOTES
"Hospital Medicine Daily Progress Note    Date of Service  3/30/2021    Chief Complaint  75 y.o. male admitted 3/22/2021 with right leg pain    Hospital Course  Patient is a 75 year old male with a past medical history of BPH, COPD, dyslipidemia, vascular dementia, and asthma who presented 3/22/2021 with right leg pain.  Patient reports sharp pain that starts from his right hip and radiates down to his right foot.  He reports he woke up with severe pain that was limiting his ability to ambulate and resulted in a fall.  He denied hitting his head or any loss of consciousness..  It is not exacerbated by movement.  He reports his skin is extremely sensitive to touch.  He denies any fevers, chills, focal weakness, numbness or tingling.  He denies any upper extremity pain.  In the ER he was noted to have an elevated CPK.  The patient does take atorvastatin.  He was admitted on 3/14 for similar symptoms and underwent x-ray of his right hip that was negative for fracture and ultrasound right lower extremity that was negative for DVT.      Interval Problem Update  Pt is axox1, per nursing was much more oriented on admission.  Pt currently denies pain, even on exam, per his CNA pt has right hip pain with elevation of bed, sitting up to eat.  Some hallucinations \"this man is making sure everything is taken care of for me.\"  No sob, ros otherwise negative    Consultants/Specialty      Code Status  Full Code    Disposition  SNF    Review of Systems  Review of Systems   Constitutional: Negative for chills, diaphoresis, fever and malaise/fatigue.   HENT: Negative for sore throat.    Eyes: Negative for blurred vision, pain, discharge and redness.   Respiratory: Negative for cough, hemoptysis, sputum production and shortness of breath.    Cardiovascular: Negative for chest pain, palpitations, claudication and leg swelling.   Gastrointestinal: Negative for abdominal pain, constipation, diarrhea, nausea and vomiting.   Genitourinary: " Negative for dysuria, flank pain, frequency, hematuria and urgency.   Musculoskeletal: Negative for back pain, joint pain, myalgias and neck pain.        R leg pain   Skin: Negative for itching.   Neurological: Negative for dizziness, tremors, sensory change, speech change, focal weakness, loss of consciousness and headaches.   Endo/Heme/Allergies: Negative for polydipsia. Does not bruise/bleed easily.   Psychiatric/Behavioral: Positive for hallucinations. Negative for depression.        Physical Exam  Temp:  [36.7 °C (98 °F)-37.2 °C (98.9 °F)] 36.9 °C (98.4 °F)  Pulse:  [78-97] 80  Resp:  [18-20] 18  BP: (130-150)/(63-80) 136/65  SpO2:  [94 %-96 %] 95 %    Physical Exam  Constitutional:       General: He is not in acute distress.     Appearance: Normal appearance. He is not diaphoretic.   HENT:      Head: Normocephalic and atraumatic.      Nose: Nose normal.      Mouth/Throat:      Pharynx: Oropharynx is clear. No oropharyngeal exudate or posterior oropharyngeal erythema.   Eyes:      General:         Right eye: No discharge.         Left eye: No discharge.   Cardiovascular:      Rate and Rhythm: Normal rate and regular rhythm.      Pulses: Normal pulses.      Heart sounds: Normal heart sounds. No gallop.    Pulmonary:      Effort: Pulmonary effort is normal. No respiratory distress.      Breath sounds: Normal breath sounds. No stridor. No wheezing, rhonchi or rales.   Chest:      Chest wall: No tenderness.   Abdominal:      General: Abdomen is flat. Bowel sounds are normal. There is no distension.      Palpations: Abdomen is soft.      Tenderness: There is no abdominal tenderness. There is no guarding or rebound.   Musculoskeletal:         General: No swelling, tenderness, deformity or signs of injury. Normal range of motion.      Cervical back: No rigidity. No muscular tenderness.      Right lower leg: No edema.      Left lower leg: No edema.   Neurological:      General: No focal deficit present.      Mental  Status: He is alert and oriented to person, place, and time. Mental status is at baseline.      Sensory: No sensory deficit.      Motor: No weakness.      Gait: Gait normal.   Psychiatric:         Mood and Affect: Mood normal.         Fluids    Intake/Output Summary (Last 24 hours) at 3/30/2021 1235  Last data filed at 3/29/2021 1400  Gross per 24 hour   Intake 320 ml   Output --   Net 320 ml       Laboratory  Recent Labs     03/28/21  0151 03/29/21  0424 03/30/21  1110   WBC 10.9* 10.9* 8.3   RBC 4.88 4.69* 4.75   HEMOGLOBIN 14.7 14.1 14.2   HEMATOCRIT 44.7 42.7 42.9   MCV 91.6 91.0 90.3   MCH 30.1 30.1 29.9   MCHC 32.9* 33.0* 33.1*   RDW 46.2 45.9 44.6   PLATELETCT 307 315 322   MPV 8.9* 9.1 9.0     Recent Labs     03/28/21  0151 03/29/21  0424 03/30/21  1110   SODIUM 135 135 136   POTASSIUM 4.0 3.8 4.0   CHLORIDE 100 100 101   CO2 23 26 25   GLUCOSE 107* 114* 109*   BUN 18 25* 22   CREATININE 0.72 0.80 0.57   CALCIUM 9.6 9.7 9.6                   Imaging  CT-HEAD W/O   Final Result      1.  No evidence of acute territorial infarct, intracranial hemorrhage or mass lesion.   2.  Moderate diffuse cerebral substance loss.   3.  At least moderate microangiopathic ischemic change versus demyelination or gliosis.      MR-LUMBAR SPINE-WITH   Final Result      1.  The postgadolinium sequences demonstrates mild epidural contrast enhancement at the levels of L4, L5 and S1 . There is no well-defined fluid collection. This is still a nonspecific finding. Since the patient does not have any increased WBC count and    there is history of recent ground-level fall, the possibility of minimal epidural hemorrhage is favored over the infection.   2.  There is no MR evidence of discitis, osteomyelitis or soft tissue infection.      MR-LUMBAR SPINE-W/O   Final Result      1.  There is ill-defined epidural space at the levels of L3, L4, L5 and S1. This is concerning for epidural pathology such as fluid collection/hemorrhage.  Contrast-enhanced study is recommended to rule out any epidural abscess. There is no significant    canal compromise.   2.  Degenerative disease as described above.      DX-SKULL-LIMITED 3-   Final Result      There is metallic denture.   No radiopaque foreign body identified.      DX-CHEST-PORTABLE (1 VIEW)   Final Result         1. No radiopaque foreign body identified.      [id number] VIEW   Final Result         No radiopaque foreign body identified.      DX-TIBIA AND FIBULA RIGHT   Final Result      No fracture of RIGHT lower leg.      DX-FEMUR-2+ RIGHT   Final Result      No RIGHT femur fracture.           Assessment/Plan  * Acute pain of right lower extremity- (present on admission)  Assessment & Plan  No evidence of fracture or DVT on imaging  ESR, CRP, b12, TSH normal  MRI showed L4-5, L5-S1 diffuse disc bulging, and possible minimal epidural hemorrhage at the levels of L3-S1 given recent fall history.  Pain improved, trial of lidocaine  Discussed with  , neurosurgery who recommended to discontinue Plavix and Lovenox (ppx dose) given possible minimal epidural hemorrhage.  Recommended outpatient follow-up.        Delirium  Assessment & Plan  Reportedly improving but some visual hallucinations  Seroquel was decreased to home dose 25 mg.    CT head without contrast no acute pathology  Continue to follow    Elevated CPK- (present on admission)  Assessment & Plan  Secondary to trauma versus statin  Statin held  Will repeat CPK    Benign prostatic hyperplasia with urinary frequency- (present on admission)  Assessment & Plan  Continue Flomax    Vascular dementia with behavior disturbance (HCC)- (present on admission)  Assessment & Plan  Continue Aricept and Seroquel    COPD (chronic obstructive pulmonary disease) (HCC)- (present on admission)  Assessment & Plan  No evidence of acute exacerbation  Continue supportive care  following  RT protocol       VTE prophylaxis: lovenox

## 2021-03-30 NOTE — FLOWSHEET NOTE
Respiratory Therapy Update                       Cough: Non Productive (03/30/21 0944)  Sputum Amount: Unable to Evaluate (03/30/21 0944)  Sputum Color: Unable to Evaluate (03/30/21 0944)  Sputum Consistency: Unable to Evaluate (03/30/21 0944)               FiO2%: 21 % (03/27/21 1810)  O2 (LPM): 0 (03/30/21 0944)       Breath Sounds  RUL Breath Sounds: Diminished (03/30/21 0944)  RML Breath Sounds: Diminished (03/30/21 0944)  RLL Breath Sounds: Diminished (03/30/21 0944)  LIZ Breath Sounds: Diminished (03/30/21 0944)  LLL Breath Sounds: Diminished (03/30/21 0749)      Events/Summary/Plan: MDI done with spacer and assistance. (03/30/21 0944)

## 2021-03-30 NOTE — CARE PLAN
Pt is more confused and only oriented to person. He expressed anxiety as he is having a hard time falling asleep. Spoke with the on call doctor and he ordered melatonin for Pt. Pt is satisfied with plan of care and resting now

## 2021-03-31 VITALS
RESPIRATION RATE: 16 BRPM | TEMPERATURE: 98.4 F | HEIGHT: 69 IN | SYSTOLIC BLOOD PRESSURE: 118 MMHG | BODY MASS INDEX: 22.2 KG/M2 | WEIGHT: 149.91 LBS | DIASTOLIC BLOOD PRESSURE: 55 MMHG | OXYGEN SATURATION: 96 % | HEART RATE: 75 BPM

## 2021-03-31 PROBLEM — M79.604 ACUTE PAIN OF RIGHT LOWER EXTREMITY: Status: RESOLVED | Noted: 2021-03-22 | Resolved: 2021-03-31

## 2021-03-31 PROBLEM — R41.0 DELIRIUM: Status: RESOLVED | Noted: 2021-03-26 | Resolved: 2021-03-31

## 2021-03-31 LAB — CK SERPL-CCNC: 464 U/L (ref 0–154)

## 2021-03-31 PROCEDURE — 700102 HCHG RX REV CODE 250 W/ 637 OVERRIDE(OP): Performed by: INTERNAL MEDICINE

## 2021-03-31 PROCEDURE — 700102 HCHG RX REV CODE 250 W/ 637 OVERRIDE(OP): Performed by: STUDENT IN AN ORGANIZED HEALTH CARE EDUCATION/TRAINING PROGRAM

## 2021-03-31 PROCEDURE — 36415 COLL VENOUS BLD VENIPUNCTURE: CPT

## 2021-03-31 PROCEDURE — G0378 HOSPITAL OBSERVATION PER HR: HCPCS

## 2021-03-31 PROCEDURE — A9270 NON-COVERED ITEM OR SERVICE: HCPCS | Performed by: STUDENT IN AN ORGANIZED HEALTH CARE EDUCATION/TRAINING PROGRAM

## 2021-03-31 PROCEDURE — 700101 HCHG RX REV CODE 250: Performed by: HOSPITALIST

## 2021-03-31 PROCEDURE — A9270 NON-COVERED ITEM OR SERVICE: HCPCS | Performed by: INTERNAL MEDICINE

## 2021-03-31 PROCEDURE — 99217 PR OBSERVATION CARE DISCHARGE: CPT | Performed by: HOSPITALIST

## 2021-03-31 PROCEDURE — 82550 ASSAY OF CK (CPK): CPT

## 2021-03-31 RX ORDER — LIDOCAINE 50 MG/G
1 PATCH TOPICAL EVERY 24 HOURS
Qty: 10 PATCH | Status: SHIPPED
Start: 2021-03-31

## 2021-03-31 RX ADMIN — LIDOCAINE 1 PATCH: 50 PATCH TOPICAL at 13:12

## 2021-03-31 RX ADMIN — GABAPENTIN 100 MG: 100 CAPSULE ORAL at 11:07

## 2021-03-31 RX ADMIN — OXYCODONE 5 MG: 5 TABLET ORAL at 11:07

## 2021-03-31 RX ADMIN — BUDESONIDE AND FORMOTEROL FUMARATE DIHYDRATE 2 PUFF: 160; 4.5 AEROSOL RESPIRATORY (INHALATION) at 13:13

## 2021-03-31 ASSESSMENT — PAIN DESCRIPTION - PAIN TYPE: TYPE: ACUTE PAIN

## 2021-03-31 ASSESSMENT — PAIN SCALES - WONG BAKER: WONGBAKER_NUMERICALRESPONSE: DOESN'T HURT AT ALL

## 2021-03-31 NOTE — CARE PLAN
Patient stated he wasn't in pain but cried out every time his left leg is moved. He was very agitated and yelling at anyone who tried to take his vitals or provide care. Repositioned and gave patient medication for pain and he is much more relaxed and calm. Spoke with patient on the importance of repositioning and that we need to keep him dry as he incontinent. Patient is very resistant to being changed and becomes agitated and cusses when nurse or cna try to change him.

## 2021-03-31 NOTE — DISCHARGE PLANNING
Agency/Facility Name: [facility]  Spoke To: Deepika  Outcome: Bed available for patient today.     DANNA Jones notified.

## 2021-03-31 NOTE — DISCHARGE INSTRUCTIONS
Discharge Instructions    Discharged to other by medical transportation with escort. Discharged via wheelchair, hospital escort: Yes.  Special equipment needed: Wheelchair    Be sure to schedule a follow-up appointment with your primary care doctor or any specialists as instructed.     Discharge Plan:   Diet Plan: Discussed  Activity Level: Discussed  Confirmed Follow up Appointment: Appointment Scheduled  Confirmed Symptoms Management: Discussed  Medication Reconciliation Updated: Yes    I understand that a diet low in cholesterol, fat, and sodium is recommended for good health. Unless I have been given specific instructions below for another diet, I accept this instruction as my diet prescription.   Other diet: regular    Special Instructions: None    · Is patient discharged on Warfarin / Coumadin?   No     Depression / Suicide Risk    As you are discharged from this [facility], it is important to learn how to keep safe from harming yourself.    Recognize the warning signs:  · Abrupt changes in personality, positive or negative- including increase in energy   · Giving away possessions  · Change in eating patterns- significant weight changes-  positive or negative  · Change in sleeping patterns- unable to sleep or sleeping all the time   · Unwillingness or inability to communicate  · Depression  · Unusual sadness, discouragement and loneliness  · Talk of wanting to die  · Neglect of personal appearance   · Rebelliousness- reckless behavior  · Withdrawal from people/activities they love  · Confusion- inability to concentrate     If you or a loved one observes any of these behaviors or has concerns about self-harm, here's what you can do:  · Talk about it- your feelings and reasons for harming yourself  · Remove any means that you might use to hurt yourself (examples: pills, rope, extension cords, firearm)  · Get professional help from the community (Mental Health, Substance Abuse, psychological  counseling)  · Do not be alone:Call your Safe Contact- someone whom you trust who will be there for you.  · Call your local CRISIS HOTLINE 312-9113 or 085-297-0568  · Call your local Children's Mobile Crisis Response Team [place] (123) 501-2977 or www.gopogo  · Call the toll free National Suicide Prevention Hotlines   · National Suicide Prevention Lifeline 966-742-UYEN (7321)  · National Hope Line Network 800-SUICIDE (283-2955)

## 2021-03-31 NOTE — CARE PLAN
Problem: Communication  Goal: The ability to communicate needs accurately and effectively will improve  Outcome: PROGRESSING SLOWER THAN EXPECTED     Problem: Knowledge Deficit  Goal: Knowledge of disease process/condition, treatment plan, diagnostic tests, and medications will improve  Outcome: PROGRESSING SLOWER THAN EXPECTED     Patient will utilize call light, tele sitter in place, hourly rounding and bed alarm on.

## 2021-03-31 NOTE — PROGRESS NOTES
Discharge instructions given to patient and escort at bedside, verbalizes understanding and states plans for follow-up with PCP and ast as recommended. Individualized , new and home medication review, post-discharge activity level, smoking/etoh cessation and worsening of symptoms needing follow-up care.   . All belongings accounted for,  Left wearing his watch to left hand.all questions answered at this time. Patient discharged  Via gurney to [facility] via [facility]. POA aware of transfer.

## 2021-03-31 NOTE — DISCHARGE PLANNING
Received Transport Form @ 1150  Spoke to Socorro @ [facility]    Transport is scheduled for 3/31 @1500 going to [facility].    [facility] transport authorization #[id number]    DANNA Jones notified  Telma @ [facility] notified

## 2021-03-31 NOTE — DISCHARGE SUMMARY
"Discharge Summary    CHIEF COMPLAINT ON ADMISSION  Chief Complaint   Patient presents with   • T-5000 GLF     pt was BIB EMS after attempting to get out of bed and \"rolling off of it\". Reports pain in the entire RL leg (hip to ankle).  NO LOC. Hx of dementia. Pt appears to be extremely sensitive to touch and cold.        Reason for Admission  ems     Admission Date  3/22/2021    CODE STATUS  Full Code    HPI & HOSPITAL COURSE  Patient is a 75 year old male with a past medical history of BPH, COPD, dyslipidemia, vascular dementia, and asthma who presented 3/22/2021 with right leg pain.  Patient reports sharp pain that starts from his right hip and radiates down to his right foot.  He reports he woke up with severe pain that was limiting his ability to ambulate and resulted in a fall.  He denied hitting his head or any loss of consciousness. It is not exacerbated by movement.  He reports his skin is extremely sensitive to touch.  He denies any fevers, chills, focal weakness, numbness or tingling.  He denies any upper extremity pain.  In the ER he was noted to have an elevated CPK.  The patient does take atorvastatin.  He was admitted on 3/14 for similar symptoms and underwent x-ray of his right hip that was negative for fracture and ultrasound right lower extremity that was negative for DVT.    A MRI of the lumbar spine was obtained and it showed diffuse disc bulging at L4-5, L5-S1 and possible minimal epidural hemorrhage at the levels of L3-S1 given recent fall history.  These findings were discussed with   of neurosurgery who recommended to discontinue Plavix and Lovenox (ppx dose) given possible minimal epidural hemorrhage and outpatient follow-up.  He was also started on lidocaine patches and he clinically improved.  He did have some transient hallucinations which have now resolved and were likely related to being in a new environment with dementia.      I updated his sister by phone and he will be " transferred to skilled nursing later today with plans for outpatient follow up.    Therefore, he is discharged in fair and stable condition to skilled nursing facility.    The patient met 2-midnight criteria for an inpatient stay at the time of discharge.    Discharge Date  3/31/21    FOLLOW UP ITEMS POST DISCHARGE  Neurosurgery  pcp    DISCHARGE DIAGNOSES  Principal Problem (Resolved):    Acute pain of right lower extremity POA: Yes  Active Problems:    GERD (gastroesophageal reflux disease) POA: Yes    COPD (chronic obstructive pulmonary disease) (HCC) POA: Yes    Tobacco abuse POA: Yes    Vascular dementia with behavior disturbance (HCC) POA: Yes    Benign prostatic hyperplasia with urinary frequency POA: Yes    Elevated CPK POA: Yes  Resolved Problems:    Delirium POA: Unknown      FOLLOW UP  No future appointments.  Ruby Henriquez M.D.  [address]  [place] [address]  [phone]          Marquez Hawley M.D.  [address] [address]  [phone]    In 3 weeks        MEDICATIONS ON DISCHARGE     Medication List      START taking these medications      Instructions   lidocaine 5 % Ptch  Commonly known as: LIDODERM   Doctor's comments: To lower back if needed  Place 1 Patch on the skin every 24 hours.  Dose: 1 Patch        CONTINUE taking these medications      Instructions   alfuzosin 10 MG SR tablet  Commonly known as: UROXATRAL   Take 1 tablet by mouth every day.  Dose: 10 mg     atorvastatin 80 MG tablet  Commonly known as: LIPITOR   Take 1 tablet by mouth every evening.  Dose: 80 mg     Breo Ellipta 100-25 MCG/INH Aepb  Generic drug: Fluticasone Furoate-Vilanterol   Inhale 1 Puff every day.  Dose: 1 Puff     Combivent Respimat  MCG/ACT Aers  Generic drug: ipratropium-albuterol   Inhale 2 Puffs every 6 hours as needed.  Dose: 2 Puff     donepezil 10 MG tablet  Commonly known as: ARICEPT   Take 1 tablet by mouth every day.  Dose: 10 mg     QUEtiapine 25 MG Tabs  Commonly  known as: Seroquel   Take 1 tablet by mouth 2 times a day.  Dose: 25 mg        STOP taking these medications    clopidogrel 75 MG Tabs  Commonly known as: PLAVIX            Allergies  No Known Allergies    DIET  Orders Placed This Encounter   Procedures   • Diet Order Diet: Regular     Standing Status:   Standing     Number of Occurrences:   1     Order Specific Question:   Diet:     Answer:   Regular [1]       ACTIVITY  As tolerated and directed by skilled nursing.  Weight bearing as tolerated    CONSULTATIONS  [facility] neurosurgery    PROCEDURES  CT-HEAD W/O   Final Result      1.  No evidence of acute territorial infarct, intracranial hemorrhage or mass lesion.   2.  Moderate diffuse cerebral substance loss.   3.  At least moderate microangiopathic ischemic change versus demyelination or gliosis.      MR-LUMBAR SPINE-WITH   Final Result      1.  The postgadolinium sequences demonstrates mild epidural contrast enhancement at the levels of L4, L5 and S1 . There is no well-defined fluid collection. This is still a nonspecific finding. Since the patient does not have any increased WBC count and    there is history of recent ground-level fall, the possibility of minimal epidural hemorrhage is favored over the infection.   2.  There is no MR evidence of discitis, osteomyelitis or soft tissue infection.      MR-LUMBAR SPINE-W/O   Final Result      1.  There is ill-defined epidural space at the levels of L3, L4, L5 and S1. This is concerning for epidural pathology such as fluid collection/hemorrhage. Contrast-enhanced study is recommended to rule out any epidural abscess. There is no significant    canal compromise.   2.  Degenerative disease as described above.      DX-SKULL-LIMITED 3-   Final Result      There is metallic denture.   No radiopaque foreign body identified.      DX-CHEST-PORTABLE (1 VIEW)   Final Result         1. No radiopaque foreign body identified.      ZD-[id number]-2 VIEW   Final Result         No  radiopaque foreign body identified.      DX-TIBIA AND FIBULA RIGHT   Final Result      No fracture of RIGHT lower leg.      DX-FEMUR-2+ RIGHT   Final Result      No RIGHT femur fracture.            LABORATORY  Lab Results   Component Value Date    SODIUM 136 03/30/2021    POTASSIUM 4.0 03/30/2021    CHLORIDE 101 03/30/2021    CO2 25 03/30/2021    GLUCOSE 109 (H) 03/30/2021    BUN 22 03/30/2021    CREATININE 0.57 03/30/2021        Lab Results   Component Value Date    WBC 8.3 03/30/2021    HEMOGLOBIN 14.2 03/30/2021    HEMATOCRIT 42.9 03/30/2021    PLATELETCT 322 03/30/2021        Total time of the discharge process exceeds 45 minutes.

## 2021-03-31 NOTE — DISCHARGE PLANNING
Anticipated Discharge Disposition:   SNF    Action:   Per DPA, [facility] accepted pt and has a bed available today, can accept pt this afternoon.     Discussed discharge planning needs during rounds. Per MD, pt is medically cleared for discharge to SNF.     Transportation communication form and [facility] PCS faxed to DPA.   Per DPA, transport confirmed at 1500 via [facility].     Per chart review, pt is confused. RN CM spoke to pt's sister/POA, Christina. Received verbal consent for discharge to SNF and transport.     CBORA, transfer packet given to bedside RN.     Barriers to Discharge:   None.    Plan:   Transport to [facility] at 1500 via [facility].  Hospital Care Management will continue to follow and assist with discharge planning needs.

## 2021-04-08 ENCOUNTER — PATIENT MESSAGE (OUTPATIENT)
Dept: HEALTH INFORMATION MANAGEMENT | Facility: OTHER | Age: 76
End: 2021-04-08

## 2021-04-15 ENCOUNTER — HOSPITAL ENCOUNTER (EMERGENCY)
Facility: MEDICAL CENTER | Age: 76
End: 2021-04-16
Attending: EMERGENCY MEDICINE | Admitting: EMERGENCY MEDICINE
Payer: MEDICARE

## 2021-04-15 ENCOUNTER — APPOINTMENT (OUTPATIENT)
Dept: RADIOLOGY | Facility: MEDICAL CENTER | Age: 76
End: 2021-04-15
Attending: EMERGENCY MEDICINE
Payer: MEDICARE

## 2021-04-15 DIAGNOSIS — S09.90XA CLOSED HEAD INJURY, INITIAL ENCOUNTER: ICD-10-CM
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
## 2021-04-15 LAB
ALBUMIN SERPL BCP-MCNC: 3.9 G/DL (ref 3.2–4.9)
ALBUMIN/GLOB SERPL: 1.1 G/DL
ALP SERPL-CCNC: 100 U/L (ref 30–99)
ALT SERPL-CCNC: 20 U/L (ref 2–50)
ANION GAP SERPL CALC-SCNC: 10 MMOL/L (ref 7–16)
APPEARANCE UR: CLEAR
AST SERPL-CCNC: 19 U/L (ref 12–45)
BASOPHILS # BLD AUTO: 0.2 % (ref 0–1.8)
BASOPHILS # BLD: 0.02 K/UL (ref 0–0.12)
BILIRUB SERPL-MCNC: 0.9 MG/DL (ref 0.1–1.5)
BILIRUB UR QL STRIP.AUTO: NEGATIVE
BUN SERPL-MCNC: 24 MG/DL (ref 8–22)
CALCIUM SERPL-MCNC: 9.7 MG/DL (ref 8.5–10.5)
CHLORIDE SERPL-SCNC: 102 MMOL/L (ref 96–112)
CO2 SERPL-SCNC: 26 MMOL/L (ref 20–33)
COLOR UR: YELLOW
CREAT SERPL-MCNC: 0.69 MG/DL (ref 0.5–1.4)
EOSINOPHIL # BLD AUTO: 0.06 K/UL (ref 0–0.51)
EOSINOPHIL NFR BLD: 0.5 % (ref 0–6.9)
ERYTHROCYTE [DISTWIDTH] IN BLOOD BY AUTOMATED COUNT: 45 FL (ref 35.9–50)
ETHANOL BLD-MCNC: <10.1 MG/DL (ref 0–10)
GLOBULIN SER CALC-MCNC: 3.6 G/DL (ref 1.9–3.5)
GLUCOSE SERPL-MCNC: 108 MG/DL (ref 65–99)
GLUCOSE UR STRIP.AUTO-MCNC: NEGATIVE MG/DL
HCT VFR BLD AUTO: 45.1 % (ref 42–52)
HGB BLD-MCNC: 14.6 G/DL (ref 14–18)
IMM GRANULOCYTES # BLD AUTO: 0.06 K/UL (ref 0–0.11)
IMM GRANULOCYTES NFR BLD AUTO: 0.5 % (ref 0–0.9)
KETONES UR STRIP.AUTO-MCNC: NEGATIVE MG/DL
LEUKOCYTE ESTERASE UR QL STRIP.AUTO: NEGATIVE
LYMPHOCYTES # BLD AUTO: 0.66 K/UL (ref 1–4.8)
LYMPHOCYTES NFR BLD: 5.3 % (ref 22–41)
MCH RBC QN AUTO: 29.7 PG (ref 27–33)
MCHC RBC AUTO-ENTMCNC: 32.4 G/DL (ref 33.7–35.3)
MCV RBC AUTO: 91.7 FL (ref 81.4–97.8)
MICRO URNS: NORMAL
MONOCYTES # BLD AUTO: 1.06 K/UL (ref 0–0.85)
MONOCYTES NFR BLD AUTO: 8.4 % (ref 0–13.4)
NEUTROPHILS # BLD AUTO: 10.69 K/UL (ref 1.82–7.42)
NEUTROPHILS NFR BLD: 85.1 % (ref 44–72)
NITRITE UR QL STRIP.AUTO: NEGATIVE
NRBC # BLD AUTO: 0 K/UL
NRBC BLD-RTO: 0 /100 WBC
PH UR STRIP.AUTO: 5.5 [PH] (ref 5–8)
PLATELET # BLD AUTO: 354 K/UL (ref 164–446)
PMV BLD AUTO: 9.3 FL (ref 9–12.9)
POTASSIUM SERPL-SCNC: 4 MMOL/L (ref 3.6–5.5)
PROT SERPL-MCNC: 7.5 G/DL (ref 6–8.2)
PROT UR QL STRIP: NEGATIVE MG/DL
RBC # BLD AUTO: 4.92 M/UL (ref 4.7–6.1)
RBC UR QL AUTO: NEGATIVE
SODIUM SERPL-SCNC: 138 MMOL/L (ref 135–145)
SP GR UR STRIP.AUTO: >=1.03
UROBILINOGEN UR STRIP.AUTO-MCNC: 0.2 MG/DL
WBC # BLD AUTO: 12.6 K/UL (ref 4.8–10.8)

## 2021-04-15 PROCEDURE — 82077 ASSAY SPEC XCP UR&BREATH IA: CPT

## 2021-04-15 PROCEDURE — 81003 URINALYSIS AUTO W/O SCOPE: CPT

## 2021-04-15 PROCEDURE — 36415 COLL VENOUS BLD VENIPUNCTURE: CPT

## 2021-04-15 PROCEDURE — 85025 COMPLETE CBC W/AUTO DIFF WBC: CPT

## 2021-04-15 PROCEDURE — 99284 EMERGENCY DEPT VISIT MOD MDM: CPT

## 2021-04-15 PROCEDURE — 93005 ELECTROCARDIOGRAM TRACING: CPT | Performed by: EMERGENCY MEDICINE

## 2021-04-15 PROCEDURE — 80053 COMPREHEN METABOLIC PANEL: CPT

## 2021-04-15 PROCEDURE — 70450 CT HEAD/BRAIN W/O DYE: CPT | Mod: MG

## 2021-04-15 RX ORDER — CLONAZEPAM 0.5 MG/1
0.5 TABLET ORAL 2 TIMES DAILY PRN
COMMUNITY

## 2021-04-15 RX ORDER — ALPRAZOLAM 0.25 MG/1
0.25 TABLET ORAL EVERY 6 HOURS PRN
COMMUNITY

## 2021-04-15 ASSESSMENT — FIBROSIS 4 INDEX: FIB4 SCORE: 1.07

## 2021-04-16 VITALS
DIASTOLIC BLOOD PRESSURE: 81 MMHG | RESPIRATION RATE: 18 BRPM | TEMPERATURE: 97.5 F | WEIGHT: 150 LBS | HEART RATE: 96 BPM | HEIGHT: 68 IN | SYSTOLIC BLOOD PRESSURE: 135 MMHG | BODY MASS INDEX: 22.73 KG/M2 | OXYGEN SATURATION: 98 %

## 2021-04-16 LAB — EKG IMPRESSION: NORMAL

## 2021-04-16 NOTE — ED NOTES
Report given to SAAD Celestin. Upon shift change pt is resting comfortably in bed with even and unlabored breaths, in no apparent distress.

## 2021-04-16 NOTE — ED NOTES
Med Rec complete per Pt's MAR from [facility]  Allergies reviewed.  No oral ABX in the last 14 days.

## 2021-04-16 NOTE — ED PROVIDER NOTES
ER Provider Note     Scribed for Lukas Sterling M.D. by Corona Farnsworth. 4/15/2021, 6:44 PM.    Primary Care Provider: Ruby Henriquez M.D.  Means of Arrival: EMS   History obtained from: EMS  History limited by: None     CHIEF COMPLAINT  Chief Complaint   Patient presents with    GLF     2 days ago; hit forehead    Altered Mental Status     more lethargic today       HPI  Remi Matt is a 75 y.o. male who presents to the Emergency Department brought in by EMS from [facility] for ALOC onset earlier today. Patient has a history of dementia and sustained a GLF 2 days ago, hitting his forehead. He normally requires Haldol, Xanax, and Seroquel to stay in bed, but staff report that today he has not needed any of that due to increased fatigue/lethargy. There are no known alleviating or exacerbating factors. He is A&O x1 at baseline.    REVIEW OF SYSTEMS  See HPI for further details. All other systems are negative.     PAST MEDICAL HISTORY   has a past medical history of Asthma, Benign prostatic hyperplasia with urinary frequency (2/25/2021), COPD (chronic obstructive pulmonary disease) (HCC), Dementia ([facility]), Dyslipidemia (2/25/2021), GERD (gastroesophageal reflux disease), and Vascular dementia with behavior disturbance ([facility]) (2/25/2021).    SURGICAL HISTORY   has a past surgical history that includes arthroscopy, knee (1978); hernia repair (child); and tonsillectomy (child).    SOCIAL HISTORY  Social History     Tobacco Use    Smoking status: Current Every Day Smoker     Packs/day: 0.25     Years: 20.00     Pack years: 5.00     Types: Cigarettes    Smokeless tobacco: Never Used    Tobacco comment: smoked for 10+ years, quit for about 5, started smoking again about 7-8 years ago   Substance Use Topics    Alcohol use: Not Currently     Alcohol/week: 0.0 oz    Drug use: No      Social History     Substance and Sexual Activity   Drug Use No       FAMILY HISTORY  Family History   Problem Relation Age of Onset     "Cancer Neg Hx     Diabetes Neg Hx     Heart Disease Neg Hx     Stroke Neg Hx     Heart Attack Neg Hx        CURRENT MEDICATIONS  Current Outpatient Medications   Medication Instructions    alfuzosin (UROXATRAL) 10 mg, Oral, EVERY DAY    atorvastatin (LIPITOR) 80 mg, Oral, EVERY EVENING    donepezil (ARICEPT) 10 mg, Oral, DAILY    Fluticasone Furoate-Vilanterol (BREO ELLIPTA) 100-25 MCG/INH AEROSOL POWDER, BREATH ACTIVATED 1 Puff, Inhalation, DAILY    ipratropium-albuterol (COMBIVENT RESPIMAT)  MCG/ACT Aero Soln 2 Puffs, Inhalation, EVERY 6 HOURS PRN    lidocaine (LIDODERM) 5 % Patch 1 Patch, Transdermal, EVERY 24 HOURS    QUEtiapine (SEROQUEL) 25 mg, Oral, 2 TIMES DAILY         ALLERGIES  No Known Allergies    PHYSICAL EXAM  VITAL SIGNS: /75   Pulse 96   Temp 36.1 °C (97 °F) (Temporal)   Resp 15   Ht 1.727 m (5' 8\")   Wt 68 kg (150 lb)   SpO2 94%   BMI 22.81 kg/m²    Constitutional: Alert in no apparent distress.  HENT: Mild abrasion to the forehead, no other signs of trauma, Bilateral external ears normal, Nose normal.   Eyes: Pupils are equal and reactive, Conjunctiva normal, Non-icteric.   Neck: Normal range of motion, No tenderness, Supple, No stridor.   Lymphatic: No lymphadenopathy noted.   Cardiovascular: Regular rate and rhythm, no palpable thrill  Thorax & Lungs: No respiratory distress,  No chest tenderness.   Abdomen: Bowel sounds normal, Soft, No tenderness, No masses, No pulsatile masses. No peritoneal signs.  Skin: Warm, Dry, No erythema, No rash.   Back: No bony tenderness, No CVA tenderness.   Extremities: Intact distal pulses, No edema, No tenderness, No cyanosis.  Musculoskeletal: Good range of motion in all major joints. No tenderness to palpation or major deformities noted.   Neurologic: Alert, mildly altered, able to follow commands, appears to be at baseline.      DIAGNOSTIC STUDIES / PROCEDURES    EKG Interpretation:  Interpreted by me  12 Lead EKG interpreted by me to " show:  Normal sinus rhythm  Rate 92  RBBB  Axis: Normal  Intervals: Normal  T wave inversion lead III and Avf  Normal ST segments  My impression of this EKG: Does not indicate ischemia or arrhythmia at this time.     LABS  Labs Reviewed   CBC WITH DIFFERENTIAL - Abnormal; Notable for the following components:       Result Value    WBC 12.6 (*)     MCHC 32.4 (*)     Neutrophils-Polys 85.10 (*)     Lymphocytes 5.30 (*)     Neutrophils (Absolute) 10.69 (*)     Lymphs (Absolute) 0.66 (*)     Monos (Absolute) 1.06 (*)     All other components within normal limits   COMP METABOLIC PANEL - Abnormal; Notable for the following components:    Glucose 108 (*)     Bun 24 (*)     Alkaline Phosphatase 100 (*)     Globulin 3.6 (*)     All other components within normal limits   URINALYSIS,CULTURE IF INDICATED    Narrative:     Indication for culture:->Patient WITHOUT an indwelling Márquez  catheter in place with new onset of Dysuria, Frequency,  Urgency, and/or Suprapubic pain   DIAGNOSTIC ALCOHOL   ESTIMATED GFR     All labs reviewed by me.    RADIOLOGY  CT-HEAD W/O   Final Result         1.  No acute intracranial abnormality is identified, there are nonspecific white matter changes, commonly associated with small vessel ischemic disease.  Associated mild cerebral atrophy is noted.   2.  Atherosclerosis.         The radiologist's interpretation of all radiological studies have been reviewed by me.    COURSE & MEDICAL DECISION MAKING  Pertinent Labs & Imaging studies reviewed. (See chart for details)    This is a 75 y.o. male that presents with altered mental status and history of striking her head a few days ago.  There is a wound noted.  We will get a CT scan to evaluate for intracranial injury.  Will evaluate for urinary tract infection as well as alcohol and electrolyte derangements.  We will get an EKG as a screening tool as well..     6:44 PM - Patient seen and examined at bedside. Ordered CT-head w/o, diagnostic alcohol, CBC  w/ diff, CMP, UA w/ culture, and EKG.     11:44 PM - Work up unremarkable; plan to discharge back to care facility.     The patient will return for new or worsening symptoms and is stable at the time of discharge.    EKG is unremarkable.  CT scan is negative.  The patient is not drunk.  The patient has no electrolyte derangements.  Is of a stress leukocytosis.  Urinalysis is negative.  The patient is at his baseline.  We will discharge him home to his facility.    The patient is referred to a primary physician for blood pressure management, diabetic screening, and for all other preventative health concerns.    DISPOSITION:  Patient will be discharged home in stable condition.    FOLLOW UP:  Ruby Henriquez M.D.  [address]  [place] [address]  [phone]    In 2 days      OUTPATIENT MEDICATIONS:  Discharge Medication List as of 4/16/2021 12:16 AM          FINAL IMPRESSION  1. Fall, initial encounter    2. Closed head injury, initial encounter          Corona ARITA ([place]), am scribing for, and in the presence of, Lukas Sterling M.D..    Electronically signed by: Corona Farnsworth (Ayahibe), 4/15/2021    ILukas M.D. personally performed the services described in this documentation, as scribed by Corona Farnsworth in my presence, and it is both accurate and complete.     The note accurately reflects work and decisions made by me.  Lukas Sterling M.D.  4/16/2021  1:15 AM

## 2021-04-16 NOTE — ED NOTES
Pt verbalizes understanding of discharge and follow-up instructions.  PIV removed.  Given Rx X0.  VSS.  All questions answered.  Pt taken by ambulance to [facility].

## 2021-04-16 NOTE — ED TRIAGE NOTES
Chief Complaint   Patient presents with   • GLF     2 days ago; hit forehead   • Altered Mental Status     more lethargic today     Pt brought in from [facility] by EMS for above complaint. Per staff pt was not seen at the hospital after the fall two days ago and showed no change in behavior until today. Pt normally requires Haldol, Xanax, and Seroquel regularly just to stay in bed and he has not needed any of that today. Pt is A&Ox1 at baseline. Pt is not on blood thinners and is DNR/DNI per paperwork from facility.

## 2021-04-20 ENCOUNTER — PATIENT OUTREACH (OUTPATIENT)
Dept: HEALTH INFORMATION MANAGEMENT | Facility: OTHER | Age: 76
End: 2021-04-20